# Patient Record
Sex: MALE | Race: WHITE | NOT HISPANIC OR LATINO | Employment: OTHER | URBAN - METROPOLITAN AREA
[De-identification: names, ages, dates, MRNs, and addresses within clinical notes are randomized per-mention and may not be internally consistent; named-entity substitution may affect disease eponyms.]

---

## 2017-01-19 ENCOUNTER — RECORDS - RIVER FALLS (OUTPATIENT)
Dept: FAMILY MEDICINE | Facility: CLINIC | Age: 57
End: 2017-01-19

## 2017-02-25 ENCOUNTER — OFFICE VISIT - RIVER FALLS (OUTPATIENT)
Dept: FAMILY MEDICINE | Facility: CLINIC | Age: 57
End: 2017-02-25

## 2017-02-25 ASSESSMENT — MIFFLIN-ST. JEOR: SCORE: 1990.13

## 2017-06-20 ENCOUNTER — OFFICE VISIT - RIVER FALLS (OUTPATIENT)
Dept: FAMILY MEDICINE | Facility: CLINIC | Age: 57
End: 2017-06-20

## 2017-06-20 ASSESSMENT — MIFFLIN-ST. JEOR: SCORE: 1999.21

## 2017-08-14 ENCOUNTER — OFFICE VISIT - RIVER FALLS (OUTPATIENT)
Dept: FAMILY MEDICINE | Facility: CLINIC | Age: 57
End: 2017-08-14

## 2017-08-14 ASSESSMENT — MIFFLIN-ST. JEOR: SCORE: 2017.35

## 2017-08-15 LAB
CREAT SERPL-MCNC: 0.94 MG/DL (ref 0.7–1.33)
GLUCOSE BLD-MCNC: 102 MG/DL (ref 65–99)
PSA SERPL-MCNC: 1.5 NG/ML

## 2017-08-30 ASSESSMENT — MIFFLIN-ST. JEOR: SCORE: 1958.38

## 2017-09-06 ENCOUNTER — SURGERY - HEALTHEAST (OUTPATIENT)
Dept: SURGERY | Facility: CLINIC | Age: 57
End: 2017-09-06

## 2017-09-06 ENCOUNTER — ANESTHESIA - HEALTHEAST (OUTPATIENT)
Dept: SURGERY | Facility: CLINIC | Age: 57
End: 2017-09-06

## 2017-12-22 ENCOUNTER — OFFICE VISIT - RIVER FALLS (OUTPATIENT)
Dept: FAMILY MEDICINE | Facility: CLINIC | Age: 57
End: 2017-12-22

## 2017-12-22 ASSESSMENT — MIFFLIN-ST. JEOR: SCORE: 2024.61

## 2018-08-30 ENCOUNTER — OFFICE VISIT - RIVER FALLS (OUTPATIENT)
Dept: FAMILY MEDICINE | Facility: CLINIC | Age: 58
End: 2018-08-30

## 2018-08-30 ASSESSMENT — MIFFLIN-ST. JEOR: SCORE: 1997.62

## 2018-08-31 ENCOUNTER — AMBULATORY - RIVER FALLS (OUTPATIENT)
Dept: FAMILY MEDICINE | Facility: CLINIC | Age: 58
End: 2018-08-31

## 2018-09-01 LAB
CHOLEST SERPL-MCNC: 233 MG/DL
CHOLEST/HDLC SERPL: 3.9 {RATIO}
CREAT SERPL-MCNC: 1.06 MG/DL (ref 0.7–1.33)
GLUCOSE BLD-MCNC: 101 MG/DL (ref 65–99)
HDLC SERPL-MCNC: 60 MG/DL
LDLC SERPL CALC-MCNC: 147 MG/DL
NONHDLC SERPL-MCNC: 173 MG/DL
PSA SERPL-MCNC: 1.9 NG/ML
TRIGL SERPL-MCNC: 132 MG/DL

## 2018-12-21 ENCOUNTER — OFFICE VISIT - RIVER FALLS (OUTPATIENT)
Dept: FAMILY MEDICINE | Facility: CLINIC | Age: 58
End: 2018-12-21

## 2018-12-21 ASSESSMENT — MIFFLIN-ST. JEOR: SCORE: 2001.25

## 2018-12-26 ENCOUNTER — AMBULATORY - RIVER FALLS (OUTPATIENT)
Dept: FAMILY MEDICINE | Facility: CLINIC | Age: 58
End: 2018-12-26

## 2018-12-27 LAB
CHOLEST SERPL-MCNC: 167 MG/DL
CHOLEST/HDLC SERPL: 2.9 {RATIO}
HDLC SERPL-MCNC: 57 MG/DL
LDLC SERPL CALC-MCNC: 95 MG/DL
NONHDLC SERPL-MCNC: 110 MG/DL
TRIGL SERPL-MCNC: 68 MG/DL

## 2019-05-21 ENCOUNTER — OFFICE VISIT - RIVER FALLS (OUTPATIENT)
Dept: FAMILY MEDICINE | Facility: CLINIC | Age: 59
End: 2019-05-21

## 2019-05-21 ASSESSMENT — MIFFLIN-ST. JEOR: SCORE: 1972.22

## 2019-09-25 ENCOUNTER — OFFICE VISIT - RIVER FALLS (OUTPATIENT)
Dept: FAMILY MEDICINE | Facility: CLINIC | Age: 59
End: 2019-09-25

## 2019-09-25 ASSESSMENT — MIFFLIN-ST. JEOR: SCORE: 1993.99

## 2019-09-26 ENCOUNTER — COMMUNICATION - RIVER FALLS (OUTPATIENT)
Dept: FAMILY MEDICINE | Facility: CLINIC | Age: 59
End: 2019-09-26

## 2019-09-26 LAB
BUN SERPL-MCNC: 23 MG/DL (ref 7–25)
BUN/CREAT RATIO - HISTORICAL: NORMAL (ref 6–22)
CALCIUM SERPL-MCNC: 9.8 MG/DL (ref 8.6–10.3)
CHLORIDE BLD-SCNC: 106 MMOL/L (ref 98–110)
CHOLEST SERPL-MCNC: 196 MG/DL
CHOLEST/HDLC SERPL: 3.1 {RATIO}
CO2 SERPL-SCNC: 26 MMOL/L (ref 20–32)
CREAT SERPL-MCNC: 0.95 MG/DL (ref 0.7–1.25)
EGFRCR SERPLBLD CKD-EPI 2021: 87 ML/MIN/1.73M2
GLUCOSE BLD-MCNC: 95 MG/DL (ref 65–99)
HDLC SERPL-MCNC: 64 MG/DL
LDLC SERPL CALC-MCNC: 111 MG/DL
NONHDLC SERPL-MCNC: 132 MG/DL
POTASSIUM BLD-SCNC: 4.6 MMOL/L (ref 3.5–5.3)
PSA SERPL-MCNC: 2.5 NG/ML
SODIUM SERPL-SCNC: 141 MMOL/L (ref 135–146)
TRIGL SERPL-MCNC: 105 MG/DL

## 2019-10-14 ENCOUNTER — OFFICE VISIT - RIVER FALLS (OUTPATIENT)
Dept: FAMILY MEDICINE | Facility: CLINIC | Age: 59
End: 2019-10-14

## 2019-12-13 ENCOUNTER — OFFICE VISIT - RIVER FALLS (OUTPATIENT)
Dept: FAMILY MEDICINE | Facility: CLINIC | Age: 59
End: 2019-12-13

## 2019-12-13 ASSESSMENT — MIFFLIN-ST. JEOR: SCORE: 1973.12

## 2019-12-15 LAB — BACTERIA SPEC CULT: NORMAL

## 2019-12-16 ENCOUNTER — COMMUNICATION - RIVER FALLS (OUTPATIENT)
Dept: FAMILY MEDICINE | Facility: CLINIC | Age: 59
End: 2019-12-16

## 2020-01-02 ENCOUNTER — OFFICE VISIT - RIVER FALLS (OUTPATIENT)
Dept: FAMILY MEDICINE | Facility: CLINIC | Age: 60
End: 2020-01-02
Payer: COMMERCIAL

## 2020-01-20 ENCOUNTER — OFFICE VISIT - RIVER FALLS (OUTPATIENT)
Dept: FAMILY MEDICINE | Facility: CLINIC | Age: 60
End: 2020-01-20
Payer: COMMERCIAL

## 2020-01-20 ASSESSMENT — MIFFLIN-ST. JEOR: SCORE: 1995.8

## 2020-02-10 ENCOUNTER — OFFICE VISIT - RIVER FALLS (OUTPATIENT)
Dept: FAMILY MEDICINE | Facility: CLINIC | Age: 60
End: 2020-02-10
Payer: COMMERCIAL

## 2020-02-20 ENCOUNTER — OFFICE VISIT - RIVER FALLS (OUTPATIENT)
Dept: FAMILY MEDICINE | Facility: CLINIC | Age: 60
End: 2020-02-20
Payer: COMMERCIAL

## 2020-02-20 ASSESSMENT — MIFFLIN-ST. JEOR: SCORE: 1982.19

## 2020-10-12 ENCOUNTER — OFFICE VISIT - RIVER FALLS (OUTPATIENT)
Dept: FAMILY MEDICINE | Facility: CLINIC | Age: 60
End: 2020-10-12
Payer: COMMERCIAL

## 2020-10-12 ASSESSMENT — MIFFLIN-ST. JEOR: SCORE: 1950.44

## 2020-10-13 ENCOUNTER — COMMUNICATION - RIVER FALLS (OUTPATIENT)
Dept: FAMILY MEDICINE | Facility: CLINIC | Age: 60
End: 2020-10-13
Payer: COMMERCIAL

## 2020-10-13 LAB
BUN SERPL-MCNC: 22 MG/DL (ref 7–25)
BUN/CREAT RATIO - HISTORICAL: ABNORMAL (ref 6–22)
CALCIUM SERPL-MCNC: 9.5 MG/DL (ref 8.6–10.3)
CHLORIDE BLD-SCNC: 103 MMOL/L (ref 98–110)
CHOLEST SERPL-MCNC: 200 MG/DL
CHOLEST/HDLC SERPL: 2.9 {RATIO}
CO2 SERPL-SCNC: 33 MMOL/L (ref 20–32)
CREAT SERPL-MCNC: 0.97 MG/DL (ref 0.7–1.25)
EGFRCR SERPLBLD CKD-EPI 2021: 84 ML/MIN/1.73M2
ERYTHROCYTE [DISTWIDTH] IN BLOOD BY AUTOMATED COUNT: 12.4 % (ref 11–15)
GLUCOSE BLD-MCNC: 94 MG/DL (ref 65–99)
HCT VFR BLD AUTO: 43.3 % (ref 38.5–50)
HDLC SERPL-MCNC: 68 MG/DL
HGB BLD-MCNC: 14.8 GM/DL (ref 13.2–17.1)
LDLC SERPL CALC-MCNC: 112 MG/DL
MCH RBC QN AUTO: 31.2 PG (ref 27–33)
MCHC RBC AUTO-ENTMCNC: 34.2 GM/DL (ref 32–36)
MCV RBC AUTO: 91.4 FL (ref 80–100)
NONHDLC SERPL-MCNC: 132 MG/DL
PLATELET # BLD AUTO: 321 10*3/UL (ref 140–400)
PMV BLD: 9.7 FL (ref 7.5–12.5)
POTASSIUM BLD-SCNC: 4.8 MMOL/L (ref 3.5–5.3)
PSA SERPL-MCNC: 2 NG/ML
RBC # BLD AUTO: 4.74 10*6/UL (ref 4.2–5.8)
SODIUM SERPL-SCNC: 140 MMOL/L (ref 135–146)
TRIGL SERPL-MCNC: 92 MG/DL
WBC # BLD AUTO: 6 10*3/UL (ref 3.8–10.8)

## 2020-10-20 DIAGNOSIS — Z11.59 ENCOUNTER FOR SCREENING FOR OTHER VIRAL DISEASES: Primary | ICD-10-CM

## 2020-11-02 ENCOUNTER — AMBULATORY - HEALTHEAST (OUTPATIENT)
Dept: LAB | Facility: CLINIC | Age: 60
End: 2020-11-02

## 2020-11-02 DIAGNOSIS — Z11.59 SCREENING FOR VIRAL DISEASE: ICD-10-CM

## 2020-11-05 ENCOUNTER — COMMUNICATION - HEALTHEAST (OUTPATIENT)
Dept: TELEHEALTH | Facility: CLINIC | Age: 60
End: 2020-11-05

## 2020-11-05 ENCOUNTER — AMBULATORY - HEALTHEAST (OUTPATIENT)
Dept: LAB | Facility: CLINIC | Age: 60
End: 2020-11-05

## 2020-11-05 DIAGNOSIS — Z11.59 SCREENING FOR VIRAL DISEASE: ICD-10-CM

## 2020-11-06 ENCOUNTER — COMMUNICATION - HEALTHEAST (OUTPATIENT)
Dept: SCHEDULING | Facility: CLINIC | Age: 60
End: 2020-11-06

## 2020-11-06 RX ORDER — MULTIPLE VITAMINS W/ MINERALS TAB 9MG-400MCG
1 TAB ORAL DAILY
COMMUNITY

## 2020-11-06 RX ORDER — TRAMADOL HYDROCHLORIDE 50 MG/1
50 TABLET ORAL EVERY 6 HOURS PRN
COMMUNITY
End: 2021-11-30

## 2020-11-06 RX ORDER — ROSUVASTATIN CALCIUM 10 MG/1
10 TABLET, COATED ORAL AT BEDTIME
COMMUNITY
End: 2022-10-04

## 2020-11-09 ENCOUNTER — APPOINTMENT (OUTPATIENT)
Dept: GENERAL RADIOLOGY | Facility: CLINIC | Age: 60
End: 2020-11-09
Attending: ORTHOPAEDIC SURGERY
Payer: COMMERCIAL

## 2020-11-09 ENCOUNTER — ANESTHESIA EVENT (OUTPATIENT)
Dept: SURGERY | Facility: CLINIC | Age: 60
End: 2020-11-09
Payer: COMMERCIAL

## 2020-11-09 ENCOUNTER — HOSPITAL ENCOUNTER (OUTPATIENT)
Facility: CLINIC | Age: 60
Discharge: HOME | End: 2020-11-10
Attending: ORTHOPAEDIC SURGERY | Admitting: ORTHOPAEDIC SURGERY
Payer: COMMERCIAL

## 2020-11-09 ENCOUNTER — ANESTHESIA (OUTPATIENT)
Dept: SURGERY | Facility: CLINIC | Age: 60
End: 2020-11-09
Payer: COMMERCIAL

## 2020-11-09 DIAGNOSIS — Z98.1 S/P ANKLE FUSION: Primary | ICD-10-CM

## 2020-11-09 PROCEDURE — 250N000011 HC RX IP 250 OP 636: Performed by: ANESTHESIOLOGY

## 2020-11-09 PROCEDURE — 360N000030 HC SURGERY LEVEL 4 W FLUORO 1ST 30 MIN: Performed by: ORTHOPAEDIC SURGERY

## 2020-11-09 PROCEDURE — 250N000013 HC RX MED GY IP 250 OP 250 PS 637: Performed by: ORTHOPAEDIC SURGERY

## 2020-11-09 PROCEDURE — 271N000001 HC OR GENERAL SUPPLY NON-STERILE: Performed by: ORTHOPAEDIC SURGERY

## 2020-11-09 PROCEDURE — 761N000001 HC RECOVERY PHASE 1 LEVEL 1 FIRST HR: Performed by: ORTHOPAEDIC SURGERY

## 2020-11-09 PROCEDURE — 761N000002 HC RECOVERY PHASE 1 LEVEL 1 EA ADDTL HR: Performed by: ORTHOPAEDIC SURGERY

## 2020-11-09 PROCEDURE — 278N000051 HC OR IMPLANT GENERAL: Performed by: ORTHOPAEDIC SURGERY

## 2020-11-09 PROCEDURE — 250N000009 HC RX 250: Performed by: PHYSICIAN ASSISTANT

## 2020-11-09 PROCEDURE — 999N000139 HC STATISTIC PRE-PROCEDURE ASSESSMENT II: Performed by: ORTHOPAEDIC SURGERY

## 2020-11-09 PROCEDURE — 258N000003 HC RX IP 258 OP 636: Performed by: ANESTHESIOLOGY

## 2020-11-09 PROCEDURE — 250N000009 HC RX 250: Performed by: ORTHOPAEDIC SURGERY

## 2020-11-09 PROCEDURE — 370N000002 HC ANESTHESIA TECHNICAL FEE, EACH ADDTL 15 MIN: Performed by: ORTHOPAEDIC SURGERY

## 2020-11-09 PROCEDURE — 250N000009 HC RX 250: Performed by: NURSE ANESTHETIST, CERTIFIED REGISTERED

## 2020-11-09 PROCEDURE — C1769 GUIDE WIRE: HCPCS | Performed by: ORTHOPAEDIC SURGERY

## 2020-11-09 PROCEDURE — 272N000001 HC OR GENERAL SUPPLY STERILE: Performed by: ORTHOPAEDIC SURGERY

## 2020-11-09 PROCEDURE — 250N000013 HC RX MED GY IP 250 OP 250 PS 637: Performed by: PHYSICIAN ASSISTANT

## 2020-11-09 PROCEDURE — 370N000001 HC ANESTHESIA TECHNICAL FEE, 1ST 30 MIN: Performed by: ORTHOPAEDIC SURGERY

## 2020-11-09 PROCEDURE — 250N000009 HC RX 250: Performed by: ANESTHESIOLOGY

## 2020-11-09 PROCEDURE — 250N000011 HC RX IP 250 OP 636: Performed by: NURSE ANESTHETIST, CERTIFIED REGISTERED

## 2020-11-09 PROCEDURE — 250N000011 HC RX IP 250 OP 636: Performed by: PHYSICIAN ASSISTANT

## 2020-11-09 PROCEDURE — 360N000027 HC SURGERY LEVEL 4 EA 15 ADDTL MIN: Performed by: ORTHOPAEDIC SURGERY

## 2020-11-09 PROCEDURE — C1713 ANCHOR/SCREW BN/BN,TIS/BN: HCPCS | Performed by: ORTHOPAEDIC SURGERY

## 2020-11-09 PROCEDURE — 250N000003 HC SEVOFLURANE, EA 15 MIN: Performed by: ORTHOPAEDIC SURGERY

## 2020-11-09 PROCEDURE — 999N000179 XR SURGERY CARM FLUORO LESS THAN 5 MIN W STILLS

## 2020-11-09 DEVICE — IMP SCR STRK LOCK 5.0X50MM FT 1896-5050S: Type: IMPLANTABLE DEVICE | Site: ANKLE | Status: FUNCTIONAL

## 2020-11-09 DEVICE — IMPLANTABLE DEVICE: Type: IMPLANTABLE DEVICE | Site: ANKLE | Status: FUNCTIONAL

## 2020-11-09 DEVICE — IMP SCR STRK LOCK 5.0X40MM FT 1896-5040S: Type: IMPLANTABLE DEVICE | Site: ANKLE | Status: FUNCTIONAL

## 2020-11-09 DEVICE — IMP SCR STRK LOCK 5.0X70MM FT 1896-5070S: Type: IMPLANTABLE DEVICE | Site: ANKLE | Status: FUNCTIONAL

## 2020-11-09 DEVICE — IMP SCR STRK LOCK 5.0X55MM FT 1896-5055S: Type: IMPLANTABLE DEVICE | Site: ANKLE | Status: FUNCTIONAL

## 2020-11-09 DEVICE — IMP SCR STRK LOCK 5.0X35MM FT 1896-5035S: Type: IMPLANTABLE DEVICE | Site: ANKLE | Status: FUNCTIONAL

## 2020-11-09 RX ORDER — HYDROXYZINE HYDROCHLORIDE 25 MG/1
25 TABLET, FILM COATED ORAL EVERY 4 HOURS PRN
Qty: 30 TABLET | Refills: 1 | Status: SHIPPED | OUTPATIENT
Start: 2020-11-09 | End: 2021-11-30

## 2020-11-09 RX ORDER — HYDROXYZINE HYDROCHLORIDE 25 MG/1
25 TABLET, FILM COATED ORAL EVERY 6 HOURS PRN
Status: DISCONTINUED | OUTPATIENT
Start: 2020-11-09 | End: 2020-11-10 | Stop reason: HOSPADM

## 2020-11-09 RX ORDER — HYDROMORPHONE HYDROCHLORIDE 1 MG/ML
.3-.5 INJECTION, SOLUTION INTRAMUSCULAR; INTRAVENOUS; SUBCUTANEOUS EVERY 5 MIN PRN
Status: DISCONTINUED | OUTPATIENT
Start: 2020-11-09 | End: 2020-11-09

## 2020-11-09 RX ORDER — ROSUVASTATIN CALCIUM 10 MG/1
10 TABLET, COATED ORAL AT BEDTIME
Status: DISCONTINUED | OUTPATIENT
Start: 2020-11-09 | End: 2020-11-10 | Stop reason: HOSPADM

## 2020-11-09 RX ORDER — ONDANSETRON 2 MG/ML
4 INJECTION INTRAMUSCULAR; INTRAVENOUS EVERY 30 MIN PRN
Status: DISCONTINUED | OUTPATIENT
Start: 2020-11-09 | End: 2020-11-09

## 2020-11-09 RX ORDER — AMOXICILLIN 250 MG
1-2 CAPSULE ORAL 2 TIMES DAILY
Qty: 30 TABLET | Refills: 0 | Status: SHIPPED | OUTPATIENT
Start: 2020-11-09 | End: 2021-11-30

## 2020-11-09 RX ORDER — CEFAZOLIN SODIUM 1 G/3ML
1 INJECTION, POWDER, FOR SOLUTION INTRAMUSCULAR; INTRAVENOUS SEE ADMIN INSTRUCTIONS
Status: DISCONTINUED | OUTPATIENT
Start: 2020-11-09 | End: 2020-11-09 | Stop reason: HOSPADM

## 2020-11-09 RX ORDER — HYDROMORPHONE HYDROCHLORIDE 1 MG/ML
0.2 INJECTION, SOLUTION INTRAMUSCULAR; INTRAVENOUS; SUBCUTANEOUS
Status: DISCONTINUED | OUTPATIENT
Start: 2020-11-09 | End: 2020-11-10 | Stop reason: HOSPADM

## 2020-11-09 RX ORDER — FENTANYL CITRATE 50 UG/ML
INJECTION, SOLUTION INTRAMUSCULAR; INTRAVENOUS PRN
Status: DISCONTINUED | OUTPATIENT
Start: 2020-11-09 | End: 2020-11-09

## 2020-11-09 RX ORDER — SODIUM CHLORIDE, SODIUM LACTATE, POTASSIUM CHLORIDE, CALCIUM CHLORIDE 600; 310; 30; 20 MG/100ML; MG/100ML; MG/100ML; MG/100ML
INJECTION, SOLUTION INTRAVENOUS CONTINUOUS
Status: DISCONTINUED | OUTPATIENT
Start: 2020-11-09 | End: 2020-11-09 | Stop reason: HOSPADM

## 2020-11-09 RX ORDER — OXYCODONE HYDROCHLORIDE 5 MG/1
5-10 TABLET ORAL
Qty: 40 TABLET | Refills: 0 | Status: SHIPPED | OUTPATIENT
Start: 2020-11-09 | End: 2021-11-30

## 2020-11-09 RX ORDER — LIDOCAINE 40 MG/G
CREAM TOPICAL
Status: DISCONTINUED | OUTPATIENT
Start: 2020-11-09 | End: 2020-11-10 | Stop reason: HOSPADM

## 2020-11-09 RX ORDER — OXYMETAZOLINE HYDROCHLORIDE 0.05 G/100ML
SPRAY NASAL AT BEDTIME
Status: DISCONTINUED | OUTPATIENT
Start: 2020-11-09 | End: 2020-11-10 | Stop reason: HOSPADM

## 2020-11-09 RX ORDER — ONDANSETRON 2 MG/ML
INJECTION INTRAMUSCULAR; INTRAVENOUS PRN
Status: DISCONTINUED | OUTPATIENT
Start: 2020-11-09 | End: 2020-11-09

## 2020-11-09 RX ORDER — PROPOFOL 10 MG/ML
INJECTION, EMULSION INTRAVENOUS PRN
Status: DISCONTINUED | OUTPATIENT
Start: 2020-11-09 | End: 2020-11-09

## 2020-11-09 RX ORDER — LIDOCAINE HYDROCHLORIDE 20 MG/ML
INJECTION, SOLUTION INFILTRATION; PERINEURAL PRN
Status: DISCONTINUED | OUTPATIENT
Start: 2020-11-09 | End: 2020-11-09

## 2020-11-09 RX ORDER — CEFAZOLIN SODIUM 2 G/100ML
2 INJECTION, SOLUTION INTRAVENOUS
Status: COMPLETED | OUTPATIENT
Start: 2020-11-09 | End: 2020-11-09

## 2020-11-09 RX ORDER — MAGNESIUM HYDROXIDE 1200 MG/15ML
LIQUID ORAL PRN
Status: DISCONTINUED | OUTPATIENT
Start: 2020-11-09 | End: 2020-11-09 | Stop reason: HOSPADM

## 2020-11-09 RX ORDER — ONDANSETRON 4 MG/1
4-8 TABLET, ORALLY DISINTEGRATING ORAL EVERY 6 HOURS PRN
Qty: 12 TABLET | Refills: 1 | Status: SHIPPED | OUTPATIENT
Start: 2020-11-09 | End: 2021-11-30

## 2020-11-09 RX ORDER — ONDANSETRON 4 MG/1
4 TABLET, ORALLY DISINTEGRATING ORAL EVERY 6 HOURS PRN
Status: DISCONTINUED | OUTPATIENT
Start: 2020-11-09 | End: 2020-11-10 | Stop reason: HOSPADM

## 2020-11-09 RX ORDER — MULTIPLE VITAMINS W/ MINERALS TAB 9MG-400MCG
1 TAB ORAL DAILY
Status: DISCONTINUED | OUTPATIENT
Start: 2020-11-09 | End: 2020-11-10 | Stop reason: HOSPADM

## 2020-11-09 RX ORDER — FENTANYL CITRATE 50 UG/ML
25-50 INJECTION, SOLUTION INTRAMUSCULAR; INTRAVENOUS
Status: DISCONTINUED | OUTPATIENT
Start: 2020-11-09 | End: 2020-11-09

## 2020-11-09 RX ORDER — OXYCODONE HYDROCHLORIDE 5 MG/1
5-10 TABLET ORAL
Status: DISCONTINUED | OUTPATIENT
Start: 2020-11-09 | End: 2020-11-10 | Stop reason: HOSPADM

## 2020-11-09 RX ORDER — NALOXONE HYDROCHLORIDE 0.4 MG/ML
.1-.4 INJECTION, SOLUTION INTRAMUSCULAR; INTRAVENOUS; SUBCUTANEOUS
Status: ACTIVE | OUTPATIENT
Start: 2020-11-09 | End: 2020-11-10

## 2020-11-09 RX ORDER — ONDANSETRON 4 MG/1
4 TABLET, ORALLY DISINTEGRATING ORAL EVERY 30 MIN PRN
Status: DISCONTINUED | OUTPATIENT
Start: 2020-11-09 | End: 2020-11-09

## 2020-11-09 RX ORDER — ONDANSETRON 2 MG/ML
4 INJECTION INTRAMUSCULAR; INTRAVENOUS EVERY 6 HOURS PRN
Status: DISCONTINUED | OUTPATIENT
Start: 2020-11-09 | End: 2020-11-10 | Stop reason: HOSPADM

## 2020-11-09 RX ORDER — DEXAMETHASONE SODIUM PHOSPHATE 4 MG/ML
INJECTION, SOLUTION INTRA-ARTICULAR; INTRALESIONAL; INTRAMUSCULAR; INTRAVENOUS; SOFT TISSUE PRN
Status: DISCONTINUED | OUTPATIENT
Start: 2020-11-09 | End: 2020-11-09

## 2020-11-09 RX ORDER — SODIUM CHLORIDE, SODIUM LACTATE, POTASSIUM CHLORIDE, CALCIUM CHLORIDE 600; 310; 30; 20 MG/100ML; MG/100ML; MG/100ML; MG/100ML
INJECTION, SOLUTION INTRAVENOUS CONTINUOUS
Status: DISCONTINUED | OUTPATIENT
Start: 2020-11-09 | End: 2020-11-10

## 2020-11-09 RX ORDER — FENTANYL CITRATE 50 UG/ML
50 INJECTION, SOLUTION INTRAMUSCULAR; INTRAVENOUS EVERY 5 MIN PRN
Status: DISCONTINUED | OUTPATIENT
Start: 2020-11-09 | End: 2020-11-09 | Stop reason: HOSPADM

## 2020-11-09 RX ADMIN — HYDROXYZINE HYDROCHLORIDE 25 MG: 25 TABLET, FILM COATED ORAL at 22:09

## 2020-11-09 RX ADMIN — CEFAZOLIN SODIUM 2 G: 2 INJECTION, SOLUTION INTRAVENOUS at 09:27

## 2020-11-09 RX ADMIN — ROSUVASTATIN CALCIUM 10 MG: 10 TABLET, FILM COATED ORAL at 22:09

## 2020-11-09 RX ADMIN — ONDANSETRON 4 MG: 2 INJECTION INTRAMUSCULAR; INTRAVENOUS at 09:31

## 2020-11-09 RX ADMIN — FENTANYL CITRATE 50 MCG: 0.05 INJECTION, SOLUTION INTRAMUSCULAR; INTRAVENOUS at 10:42

## 2020-11-09 RX ADMIN — DEXAMETHASONE SODIUM PHOSPHATE 4 MG: 4 INJECTION, SOLUTION INTRA-ARTICULAR; INTRALESIONAL; INTRAMUSCULAR; INTRAVENOUS; SOFT TISSUE at 09:31

## 2020-11-09 RX ADMIN — SODIUM CHLORIDE, POTASSIUM CHLORIDE, SODIUM LACTATE AND CALCIUM CHLORIDE: 600; 310; 30; 20 INJECTION, SOLUTION INTRAVENOUS at 11:55

## 2020-11-09 RX ADMIN — SODIUM CHLORIDE, POTASSIUM CHLORIDE, SODIUM LACTATE AND CALCIUM CHLORIDE: 600; 310; 30; 20 INJECTION, SOLUTION INTRAVENOUS at 08:11

## 2020-11-09 RX ADMIN — BUPIVACAINE HYDROCHLORIDE 20 ML GIVEN: 5 INJECTION, SOLUTION EPIDURAL; INTRACAUDAL; PERINEURAL at 08:23

## 2020-11-09 RX ADMIN — OXYMETAZOLINE HYDROCHLORIDE: 0.05 SPRAY NASAL at 22:10

## 2020-11-09 RX ADMIN — FENTANYL CITRATE 50 MCG: 50 INJECTION, SOLUTION INTRAMUSCULAR; INTRAVENOUS at 09:19

## 2020-11-09 RX ADMIN — OXYCODONE HYDROCHLORIDE 10 MG: 5 TABLET ORAL at 22:09

## 2020-11-09 RX ADMIN — LIDOCAINE HYDROCHLORIDE 0.1 ML: 10 INJECTION, SOLUTION EPIDURAL; INFILTRATION; INTRACAUDAL; PERINEURAL at 07:26

## 2020-11-09 RX ADMIN — PROPOFOL 200 MG: 10 INJECTION, EMULSION INTRAVENOUS at 09:23

## 2020-11-09 RX ADMIN — HYDROMORPHONE HYDROCHLORIDE 0.5 MG: 1 INJECTION, SOLUTION INTRAMUSCULAR; INTRAVENOUS; SUBCUTANEOUS at 10:51

## 2020-11-09 RX ADMIN — FENTANYL CITRATE 50 MCG: 0.05 INJECTION, SOLUTION INTRAMUSCULAR; INTRAVENOUS at 08:20

## 2020-11-09 RX ADMIN — LIDOCAINE HYDROCHLORIDE 100 MG: 20 INJECTION, SOLUTION INFILTRATION; PERINEURAL at 09:19

## 2020-11-09 RX ADMIN — BUPIVACAINE HYDROCHLORIDE 15 ML GIVEN: 5 INJECTION, SOLUTION EPIDURAL; INTRACAUDAL; PERINEURAL at 11:20

## 2020-11-09 RX ADMIN — PROPOFOL 200 MG: 10 INJECTION, EMULSION INTRAVENOUS at 09:19

## 2020-11-09 RX ADMIN — MIDAZOLAM HYDROCHLORIDE 2 MG: 1 INJECTION, SOLUTION INTRAMUSCULAR; INTRAVENOUS at 08:21

## 2020-11-09 RX ADMIN — OXYCODONE HYDROCHLORIDE 5 MG: 5 TABLET ORAL at 19:08

## 2020-11-09 RX ADMIN — FENTANYL CITRATE 50 MCG: 50 INJECTION, SOLUTION INTRAMUSCULAR; INTRAVENOUS at 09:35

## 2020-11-09 SDOH — HEALTH STABILITY: MENTAL HEALTH: CURRENT SMOKER: 0

## 2020-11-09 ASSESSMENT — MIFFLIN-ST. JEOR: SCORE: 1937.53

## 2020-11-09 ASSESSMENT — LIFESTYLE VARIABLES: TOBACCO_USE: 0

## 2020-11-09 NOTE — BRIEF OP NOTE
[facility]    Brief Operative Note    Pre-operative diagnosis: Disorder of ankle [M25.9]  Post-operative diagnosis Failed L ankle fusion    Procedure: Procedure(s):  REVISION LEFT ANKLE FUSION WITH  CORRECTIVE OSTEOTOMY  Surgeon: Surgeon(s) and Role:     * Damaris Panda MD - Primary     * Froilan López PA-C - Assisting  Anesthesia: Combined General with Block   Estimated blood loss: Less than 50 ml  Drains: None  Specimens:   ID Type Source Tests Collected by Time Destination   1 : LEFT ANKLE HARDWARE Other (specify in comments) Other OR DOCUMENTATION ONLY Damaris Panda MD 11/9/2020 10:13 AM      Findings:   Expected.  Complications: None.  Implants:   Implant Name Type Inv. Item Serial No.  Lot No. LRB No. Used Action   previously implanted screws      Left 11 Explanted   previously implanted plate      Left 1 Explanted   T2 Ankle System Ankle Arthrodesis Nail, left 012 x 200mm    YADI [id number] Left 1 Implanted   IMP SCR STRK LOCK 5.0X55MM FT 1896-5055S Metallic Hardware/Angelica IMP SCR STRK LOCK 5.0X55MM FT 1896-5055S  [employer] [id number] Left 1 Implanted   IMP SCR STRK LOCK 5.0X50MM FT 1896-5050S Metallic Hardware/Angelica IMP SCR STRK LOCK 5.0X50MM FT 1896-5050S  [employer] [id number] Left 1 Implanted   IMP SCR STRK LOCK 5.0X35MM FT 1896-5035S Metallic Hardware/Angelica IMP SCR STRK LOCK 5.0X35MM FT 1896-5035S  [employer] [id number] Left 1 Implanted   IMP SCR STRK LOCK 5.0X70MM FT 1896-5070S Metallic Hardware/Angelica IMP SCR STRK LOCK 5.0X70MM FT 1896-5070S  YADIBuru Buru [id number] Left 1 Implanted   IMP END CAP STRK T2 FT SPNIAL SCR 1826-0003S Metallic Hardware/Angelica IMP END CAP STRK T2 FT SPNIAL SCR 1826-0003S  [employer] [id number] Left 1 Implanted   IMP SCR STRK LOCK 5.0X40MM FT 1896-5040S Metallic Hardware/Angelica IMP SCR STRK LOCK 5.0X40MM FT 1896-5040S  [employer] [id number] Left 1 Implanted

## 2020-11-09 NOTE — ANESTHESIA PROCEDURE NOTES
Procedure note : Saphenous      Staff -   Anesthesiologist:  Bean Hernández MD  Performed By: anesthesiologist  Pre-Procedure  Performed by Bean Hernández MD  Location: pre-op      Pre-Anesthestic Checklist: patient identified, IV checked, site marked, risks and benefits discussed, informed consent, monitors and equipment checked, pre-op evaluation, at physician/surgeon's request and post-op pain management    Timeout  Correct Patient: Yes   Correct Procedure: Yes   Correct Site: Yes   Correct Laterality: Yes   Correct Position: Yes   Site Marked: Yes   .   Procedure Documentation    .    Procedure: Saphenous, left.   Patient Position:supine   Ultrasound used to identify targeted nerve, plexus, or vascular marker and placed a needle adjacent to it., Ultrasound was used to visualize the spread of the anesthetic in close proximity to the above stated nerve. A permanent image is entered into the patient's record.  Patient Prep/Sterile Barriers; chlorhexidine gluconate and isopropyl alcohol.  .  Needle: short bevel   Needle Gauge: 21.    Needle Length (Inches) 4   .        Assessment/Narrative  Paresthesias: No.  .  The placement was negative for: blood aspirated, painful injection and site bleeding.  Bolus given via..   Secured via.   Complications: none. Comments:  Ultrasound Interpretation, Peripheral Nerve Block. 15 ml of 0.5% Bupivicaine with 1/400k epi.     1. Under ultrasound guidance, the needle was inserted and placed in close proximity to the target nerve(s).  2. Ultrasound was also used to visualize the spread of the anesthetic in close proximity to the nerve(s) being blocked.  Local anesthetic was administered in incremental doses, with intermittent negative aspiration.    3. The nerve(s) appeared anatomically normal.  4. There were no apparent abnormal pathological findings.  5. A permanent ultrasound image was saved in the patient's record.    Pt tolerated well.    No complications.      The  surgeon has given a verbal order transferring care of this patient to me for the performance of a regional analgesia block for post-op pain control. It is requested of me because I am uniquely trained and qualified to perform this block and the surgeon is neither trained nor qualified to perform this procedure.    Bean Hernández MD   11:23 AM

## 2020-11-09 NOTE — OR NURSING
Report given to Tarsha RN station 55. Glasses and hearing aid x 2 given back to patient. One White belongings bag and cpap sent up with patient.

## 2020-11-09 NOTE — ANESTHESIA POSTPROCEDURE EVALUATION
Patient: Reggie Owens    Procedure(s):  REVISION LEFT ANKLE FUSION WITH  CORRECTIVE OSTEOTOMY    Diagnosis:Disorder of ankle [M25.9]  Diagnosis Additional Information: No value filed.    Anesthesia Type:  General, Peripheral Nerve Block    Note:  Anesthesia Post Evaluation    Patient location during evaluation: PACU  Patient participation: Able to participate in evaluation but full recovery from regional anesthesia has not yet ocurrred but is anticipated to occur within 48 hours  Level of consciousness: awake and alert  Pain management: adequate  Airway patency: patent  Cardiovascular status: acceptable and stable  Respiratory status: acceptable, spontaneous ventilation, unassisted and nonlabored ventilation  Hydration status: acceptable  PONV: none             Last vitals:  Vitals:    11/09/20 1032 11/09/20 1045 11/09/20 1100   BP: (!) 125/90 (!) 141/99 132/88   Pulse: 78 75 75   Resp: 16 12 20   Temp: 36.1  C (96.9  F)     SpO2: 96% 97% 96%         Electronically Signed By: Bean Hernández MD  November 9, 2020  11:02 AM

## 2020-11-09 NOTE — ANESTHESIA PROCEDURE NOTES
Airway   Date/Time: 11/9/2020 9:25 AM   Patient location during procedure: OR    Staff -   Anesthesiologist:  Bean Hernández MD  CRNA: Elke Singer APRN CRNA  Performed By: CRNA    Consent for Airway   Urgency: elective    Indications and Patient Condition  Indications for airway management: alec-procedural  Mask difficulty assessment: 0 - not attempted    Final Airway Details  Final airway type: supraglottic airway    Endotracheal Airway Details   Secured with: commercial tube gil and pink tape    Post intubation assessment   Placement verified by: capnometry, equal breath sounds and chest rise   Number of attempts at approach: 1  Secured with:commercial tube gil and pink tape  Ease of procedure: easy  Dentition: Intact and Unchanged

## 2020-11-09 NOTE — OR NURSING
Patient rating pain 9/10 after dilaudid. Dr. Hernández here and administered a nerve block. Pain now 4/10.

## 2020-11-09 NOTE — ADDENDUM NOTE
Addendum  created 11/09/20 1125 by Bean Hernández MD    Child order released for a procedure order, Clinical Note Signed, Intraprocedure Blocks edited, Intraprocedure Meds edited

## 2020-11-09 NOTE — ANESTHESIA PREPROCEDURE EVALUATION
"Anesthesia Pre-Procedure Evaluation    Patient: Reggie Owens   MRN: [id number] : 1960          Preoperative Diagnosis: Disorder of ankle [M25.9]    Procedure(s):  REVISION LEFT ANKLE FUSION WITH  CORRECTIVE OSTEOTOMY    Past Medical History:   Diagnosis Date     Obese      Other chronic pain      Sleep apnea      Past Surgical History:   Procedure Laterality Date     COLONOSCOPY       ENT SURGERY      T+A     ORTHOPEDIC SURGERY      rt knee replacement,lt ankle X2,, facial reconstruction       Anesthesia Evaluation     .             ROS/MED HX    ENT/Pulmonary:     (+)sleep apnea, uses CPAP , . .   (-) tobacco use and asthma   Neurologic:  - neg neurologic ROS     Cardiovascular:  - neg cardiovascular ROS      (-) CAD   METS/Exercise Tolerance:  >4 METS   Hematologic:  - neg hematologic  ROS       Musculoskeletal:   (+) arthritis,  -       GI/Hepatic:  - neg GI/hepatic ROS       Renal/Genitourinary:         Endo:     (+) Obesity, .   (-) Type I DM and Type II DM   Psychiatric:  - neg psychiatric ROS       Infectious Disease:         Malignancy:         Other:                                 No results found for: WBC, HGB, HCT, PLT, CRP, SED, NA, POTASSIUM, CHLORIDE, CO2, BUN, CR, GLC, MARYLIN, PHOS, MAG, ALBUMIN, PROTTOTAL, ALT, AST, GGT, ALKPHOS, BILITOTAL, BILIDIRECT, LIPASE, AMYLASE, CASPER, PTT, INR, FIBR, TSH, T4, T3, HCG, HCGS, CKTOTAL, CKMB, TROPN    Preop Vitals  BP Readings from Last 3 Encounters:   20 (!) 155/94    Pulse Readings from Last 3 Encounters:   No data found for Pulse      Resp Readings from Last 3 Encounters:   20 16    SpO2 Readings from Last 3 Encounters:   20 97%      Temp Readings from Last 1 Encounters:   20 36.4  C (97.5  F) (Temporal)    Ht Readings from Last 1 Encounters:   20 1.88 m (6' 2\")      Wt Readings from Last 1 Encounters:   20 106.3 kg (234 lb 4.8 oz)    Estimated body mass index is 30.08 kg/m  as calculated from the following:    " "Height as of this encounter: 1.88 m (6' 2\").    Weight as of this encounter: 106.3 kg (234 lb 4.8 oz).       Anesthesia Plan      History & Physical Review  History and physical reviewed and following examination; no interval change.    ASA Status:  2 .    NPO Status:  > 8 hours    Plan for General and Peripheral Nerve Block with Intravenous induction. Maintenance will be Balanced.    PONV prophylaxis:  Ondansetron (or other 5HT-3) and Dexamethasone or Solumedrol  LMA  Popliteal block.    The patient is not a current smoker      Postoperative Care  Postoperative pain management:  Multi-modal analgesia.      Consents  Anesthetic plan, risks, benefits and alternatives discussed with:  Patient.  Use of blood products discussed: No .   .                 Bean Hernández MD  "

## 2020-11-09 NOTE — ANESTHESIA CARE TRANSFER NOTE
Patient: Reggie Owens    Procedure(s):  REVISION LEFT ANKLE FUSION WITH  CORRECTIVE OSTEOTOMY    Diagnosis: Disorder of ankle [M25.9]  Diagnosis Additional Information: No value filed.    Anesthesia Type:   General, Peripheral Nerve Block     Note:  Airway :Face Mask  Patient transferred to:PACU  Comments: At end of procedure, spontaneous respirations, adequate tidal volumes, followed commands to voice, LMA removed atraumatically, oropharynx suctioned. Oxygen via facemask at 4 liters per minute to PACU. Oxygen tubing connected to wall O2 in PACU, SpO2, NiBP, and EKG monitors and alarms on and functioning, Semaj Hugger warmer connected to patient gown, report on patient's clinical status given to PACU RN.Handoff Report: Identifed the Patient, Identified the Reponsible Provider, Reviewed the pertinent medical history, Discussed the surgical course, Reviewed Intra-OP anesthesia mangement and issues during anesthesia, Set expectations for post-procedure period and Allowed opportunity for questions and acknowledgement of understanding      Vitals: (Last set prior to Anesthesia Care Transfer)    CRNA VITALS  11/9/2020 1001 - 11/9/2020 1033      11/9/2020             Pulse:  94    SpO2:  96 %    Resp Rate (set):  10                Electronically Signed By: CLAIRE Kendall CRNA  November 9, 2020  10:33 AM

## 2020-11-09 NOTE — OP NOTE
Procedure Date: 11/09/2020      PREOPERATIVE DIAGNOSIS:  Malunion of a previous left ankle fusion done elsewhere.      POSTOPERATIVE DIAGNOSIS:  Malunion of a previous left ankle fusion done elsewhere.      SURGICAL PROCEDURES:     1.  Corrective osteotomy and revision of the left ankle and TTC  fusion.   2.  Removal of plate and screws from the left ankle and subtalar joint.      ANESTHESIA:  General.      SURGEON:  Damaris Panda MD      ASSISTANT:  ALIDA Landeros      PREAMBLE:  Mr. Owens previously had an ankle and subtalar fusion done by Zachary Rasmussen DPM.  This was unfortunately not well-positioned and went onto a nonunion and a 20-degree varus malunion of the ankle, which caused him significant discomfort.      Informed consent was obtained for the above-mentioned procedure.      Two grams of Ancef was given prior to surgery.  There is no need for postoperative antibiotic prophylaxis.      DESCRIPTION OF PROCEDURE:  After adequate induction of a general anesthetic, the patient was positioned supine on the operating table.  The left leg was sterilely prepped and free draped in the usual fashion.  Tourniquet around the thigh was inflated to 300 mmHg.      The previous midline anterior incision was used over the ankle to expose the plate and screws.  The neurovascular bundle was protected.  The Arthrex plate and screws were removed.      A separate incision was made posterior over the heel and the large fragment compression screw removed from the subtalar joint.      A saw was then used to do a lateral closing wedge osteotomy through the nonunion and malunion of the ankle fusion.  A separate incision was made laterally over the fibula.  The lateral closing wedge osteotomy was done to correct the varus malalignment of the ankle.      An incision was then made plantar over the heel.  Under fluoroscopic guidance, a guide pin was inserted through the calcaneus and subtalar joint and across the ankle joint into  the tibia.  It was over-reamed to 13 mm diameter.  A Mikey T2 salbador was then impacted at 200 x 12 mm.      The salbador was impacted in place and then interlocking screws inserted first distal and later proximal for compression across the joint.  Excellent alignment and fixation was obtained.      The tourniquet was deflated.  Hemostasis obtained.  The wounds closed in layers.  A sterile dressing and a light compressive bandage were applied, followed by a cast splint.  He tolerated the procedure well and was taken to the recovery room in satisfactory condition.      He can ambulate partial weightbearing with crutches.  Sutures will be removed in 2 weeks.         HARRISON DYKES MD             D: 2020   T: 2020   MT: SEA      Name:     MANDO NEGRO   MRN:      [id number]        Account:        [id number]   :      1960           Procedure Date: 2020      Document: [id number]

## 2020-11-09 NOTE — ANESTHESIA PROCEDURE NOTES
Procedure note : Sciatic and Popliteal      Staff -   Anesthesiologist:  Bean Hernández MD  Performed By: anesthesiologist  Pre-Procedure  Performed by Bean Hernández MD  Location: pre-op      Pre-Anesthestic Checklist: patient identified, IV checked, site marked, risks and benefits discussed, informed consent, monitors and equipment checked, pre-op evaluation, at physician/surgeon's request and post-op pain management    Timeout  Correct Patient: Yes   Correct Procedure: Yes   Correct Site: Yes   Correct Laterality: Yes   Correct Position: Yes   Site Marked: Yes   .   Procedure Documentation    .    Procedure: Sciatic and Popliteal, left.   Patient Position:supine   Ultrasound used to identify targeted nerve, plexus, or vascular marker and placed a needle adjacent to it., Ultrasound was used to visualize the spread of the anesthetic in close proximity to the above stated nerve. A permanent image is entered into the patient's record.  Patient Prep/Sterile Barriers; mask, sterile gloves, chlorhexidine gluconate and isopropyl alcohol, patient draped.  .  Needle: short bevel   Needle Gauge: 21.    Needle Length (Inches) 4   Insertion Method: Single Shot.        Assessment/Narrative  Paresthesias: No.  Injection made incrementally with aspirations every 5 mL..  The placement was negative for: blood aspirated, painful injection and site bleeding.  Bolus given via..   Secured via.   Complications: none. Comments:  20 CC of 0.5 % Bupivacaine with 1/400k epinephrine injected easily around sciatic nerve.  No complications.    Ultrasound Interpretation, Peripheral Nerve Block    1. Under ultrasound guidance, the needle was inserted and placed in close proximity to the target nerve(s).  2. Ultrasound was also used to visualize the spread of the anesthetic in close proximity to the nerve(s) being blocked.  Local anesthetic was administered in incremental doses, with intermittent negative aspiration.    3. The  nerve(s) appeared anatomically normal.  4. There were no apparent abnormal pathological findings.  5. A permanent ultrasound image was saved in the patient's record.    Pt tolerated well.    No complications.      The surgeon has given a verbal order transferring care of this patient to me for the performance of a regional analgesia block for post-op pain control. It is requested of me because I am uniquely trained and qualified to perform this block and the surgeon is neither trained nor qualified to perform this procedure.

## 2020-11-09 NOTE — PROVIDER NOTIFICATION
Called ALIDA Nagy as pt did not have anything ordered for pain. PA ordered IV didaudid and oxycodone for pain.

## 2020-11-10 ENCOUNTER — APPOINTMENT (OUTPATIENT)
Dept: PHYSICAL THERAPY | Facility: CLINIC | Age: 60
End: 2020-11-10
Attending: PHYSICIAN ASSISTANT
Payer: COMMERCIAL

## 2020-11-10 VITALS
BODY MASS INDEX: 30.07 KG/M2 | HEIGHT: 74 IN | SYSTOLIC BLOOD PRESSURE: 135 MMHG | WEIGHT: 234.3 LBS | DIASTOLIC BLOOD PRESSURE: 80 MMHG | HEART RATE: 85 BPM | RESPIRATION RATE: 16 BRPM | OXYGEN SATURATION: 96 % | TEMPERATURE: 98.2 F

## 2020-11-10 PROCEDURE — 250N000013 HC RX MED GY IP 250 OP 250 PS 637: Performed by: ORTHOPAEDIC SURGERY

## 2020-11-10 PROCEDURE — 250N000013 HC RX MED GY IP 250 OP 250 PS 637: Performed by: PHYSICIAN ASSISTANT

## 2020-11-10 PROCEDURE — 97161 PT EVAL LOW COMPLEX 20 MIN: CPT | Mod: GP | Performed by: PHYSICAL THERAPIST

## 2020-11-10 PROCEDURE — 250N000011 HC RX IP 250 OP 636: Performed by: ORTHOPAEDIC SURGERY

## 2020-11-10 PROCEDURE — 97530 THERAPEUTIC ACTIVITIES: CPT | Mod: GP | Performed by: PHYSICAL THERAPIST

## 2020-11-10 RX ADMIN — HYDROXYZINE HYDROCHLORIDE 25 MG: 25 TABLET, FILM COATED ORAL at 04:17

## 2020-11-10 RX ADMIN — OXYCODONE HYDROCHLORIDE 10 MG: 5 TABLET ORAL at 03:59

## 2020-11-10 RX ADMIN — HYDROMORPHONE HYDROCHLORIDE 0.2 MG: 1 INJECTION, SOLUTION INTRAMUSCULAR; INTRAVENOUS; SUBCUTANEOUS at 03:09

## 2020-11-10 RX ADMIN — HYDROMORPHONE HYDROCHLORIDE 0.2 MG: 1 INJECTION, SOLUTION INTRAMUSCULAR; INTRAVENOUS; SUBCUTANEOUS at 04:17

## 2020-11-10 RX ADMIN — OXYCODONE HYDROCHLORIDE 10 MG: 5 TABLET ORAL at 07:24

## 2020-11-10 RX ADMIN — OXYCODONE HYDROCHLORIDE 10 MG: 5 TABLET ORAL at 10:12

## 2020-11-10 RX ADMIN — OXYCODONE HYDROCHLORIDE 5 MG: 5 TABLET ORAL at 01:08

## 2020-11-10 RX ADMIN — HYDROXYZINE HYDROCHLORIDE 25 MG: 25 TABLET, FILM COATED ORAL at 09:34

## 2020-11-10 NOTE — PLAN OF CARE
Up with assist of 1 and walker. Taking oxycodone and atarax for pain. Discharged home at 11:30, wife present for discharge teaching and given discharge medications.

## 2020-11-10 NOTE — PROGRESS NOTES
11/10/20 1052   Quick Adds   Type of Visit Initial PT Evaluation   Living Environment   People in home spouse   Current Living Arrangements house   Home Accessibility stairs to enter home;stairs within home   Number of Stairs, Main Entrance 3   Stair Railings, Main Entrance railing on left side (ascending)   Number of Stairs, Within Home, Primary none   Stair Railings, Within Home, Primary none   Transportation Anticipated car, drives self   Self-Care   Usual Activity Tolerance good   Current Activity Tolerance moderate   Regular Exercise No   Equipment Currently Used at Home none   Activity/Exercise/Self-Care Comment Pt owns knee scooter, FWW, and crutches   Disability/Function   Fall history within last six months no   General Information   Onset of Illness/Injury or Date of Surgery 11/09/20   Referring Physician Froilan López, PA-C   Patient/Family Therapy Goals Statement (PT) Return home.    Pertinent History of Current Problem (include personal factors and/or comorbidities that impact the POC) 61 y/o male POD # 1 L TTC fusion and hardware removal.    Existing Precautions/Restrictions fall   Weight-Bearing Status - LLE nonweight-bearing   General Observations Pt in supine upon arrival of therapist.    Cognition   Orientation Status (Cognition) oriented x 3   Affect/Mental Status (Cognition) WNL   Follows Commands (Cognition) WNL   Pain Assessment   Patient Currently in Pain   (L ankle pain at rest: 8/10)   Integumentary/Edema   Integumentary/Edema Comments L ankle covered with splint and ace bandage.    Posture    Posture Comments Noted forward head and shoulder posture sitting EOB and standing at FWW.    Range of Motion (ROM)   ROM Comment Limited L ankle ROM secondary to splint and recent procedure, otherwise B LEs WFL.    Strength   Strength Comments Not formally assessed, pt demonstrates at least 3/5 grossly in B LEs with functional mobility.    Bed Mobility   Comment (Bed Mobility) Supine <> sit  independently.    Transfers   Transfer Safety Comments Sit <> stand with FWW and CGA.    Gait/Stairs (Locomotion)   Comment (Gait/Stairs) Pt amb 5' with FWW and CGA.    Balance   Balance Comments Noted good seated and standing balance at FWW.    Clinical Impression   Criteria for Skilled Therapeutic Intervention yes, treatment indicated   PT Diagnosis (PT) Difficulty with functional mobility.    Influenced by the following impairments Pain, Decreased activity tolerance, Generalized weakness   Functional limitations due to impairments Limited functional mobility requiring AD and assist.    Clinical Presentation Stable/Uncomplicated   Clinical Presentation Rationale Based on PMH, current presentation, and social support.   Clinical Decision Making (Complexity) low complexity   Therapy Frequency (PT) Daily   Predicted Duration of Therapy Intervention (days/wks) 1 day   Planned Therapy Interventions (PT) bed mobility training;gait training;stair training;strengthening;transfer training   Risk & Benefits of therapy have been explained patient   PT Discharge Planning    PT Rationale for DC Rec SBA for transfers and ambulation with use of FWW/knee scooter and [place] to navigate stairs with use of railing and crutch   Total Evaluation Time   Total Evaluation Time (Minutes) 3

## 2020-11-10 NOTE — PROGRESS NOTES
Reggie Owens  11/10/2020  POD #1 L TTC fusion, hardware removal.    Doing well.  No immediate surgical complications identified.  No excessive bleeding  Pain well-controlled.  Temperatures:  Current - Temp: 98  F (36.7  C); Max - Temp  Av.4  F (36.3  C)  Min: 96.9  F (36.1  C)  Max: 98.3  F (36.8  C)  Pulse range: Pulse  Av.6  Min: 56  Max: 84  Blood pressure range: Systolic (24hrs), Av , Min:118 , Max:143   ; Diastolic (24hrs), Av, Min:76, Max:99    CMS: intact to L LE  Labs:none    PLAN:PT eval this AM, plan discharge home today.

## 2020-11-10 NOTE — PLAN OF CARE
Pt is A&Ox4, VSS on RA. Up w/SBA, walker, and gait belt, voiding in bathroom. Regular diet. CMS intact, LLE dressing with small amount of sanguinous drainage. Pain managed with PRN oxycodone x3 and PRN IV Dilaudid x2. IV SL. Potentially discharging home later today. Will continue to follow plan of care.

## 2020-11-10 NOTE — PLAN OF CARE
Patient vital signs are at baseline: Yes  Patient able to ambulate as they were prior to admission or with assist devices provided by therapies during their stay:  Yes  Patient MUST void prior to discharge:  Yes  Patient able to tolerate oral intake:  Yes  Pain has adequate pain control using Oral analgesics:  block in tact, pt does not having feeling in RLE.

## 2020-11-11 ENCOUNTER — COMMUNICATION - RIVER FALLS (OUTPATIENT)
Dept: FAMILY MEDICINE | Facility: CLINIC | Age: 60
End: 2020-11-11
Payer: COMMERCIAL

## 2021-02-08 ENCOUNTER — OFFICE VISIT - RIVER FALLS (OUTPATIENT)
Dept: FAMILY MEDICINE | Facility: CLINIC | Age: 61
End: 2021-02-08
Payer: COMMERCIAL

## 2021-02-08 ASSESSMENT — MIFFLIN-ST. JEOR: SCORE: 2018.48

## 2021-05-31 VITALS — BODY MASS INDEX: 30.8 KG/M2 | WEIGHT: 240 LBS | HEIGHT: 74 IN

## 2021-06-12 NOTE — ANESTHESIA PREPROCEDURE EVALUATION
Anesthesia Evaluation      Patient summary reviewed   No history of anesthetic complications     Airway   Mallampati: II  Neck ROM: full   Pulmonary - normal exam    breath sounds clear to auscultation  (+) sleep apnea on CPAP, ,                          Cardiovascular - negative ROS and normal exam  Exercise tolerance: > or = 4 METS  ECG reviewed  Rhythm: regular  Rate: normal,         Neuro/Psych - negative ROS     Endo/Other - negative ROS      GI/Hepatic/Renal - negative ROS   (-) GERD          Dental - normal exam                        Anesthesia Plan  Planned anesthetic: general LMA and peripheral nerve block    ASA 2   Induction: intravenous   Anesthetic plan and risks discussed with: patient  Anesthesia plan special considerations: antiemetics,   Post-op plan: routine recovery

## 2021-06-12 NOTE — ANESTHESIA POSTPROCEDURE EVALUATION
Patient: Reggie Owens  LEFT ANKLE FUSION, HARDWARE REMOVAL  Anesthesia type: general    Patient location: PACU  Last vitals:   Vitals:    09/06/17 1840   BP: 143/89   Pulse: 85   Resp: 14   Temp: 36.4  C (97.5  F)   SpO2: 94%     Post vital signs: stable  Level of consciousness: awake and responds to simple questions  Post-anesthesia pain: pain controlled  Post-anesthesia nausea and vomiting: no  Pulmonary: unassisted, return to baseline  Cardiovascular: stable and blood pressure at baseline  Hydration: adequate  Anesthetic events: no    QCDR Measures:  ASA# 11 - Tanja-op Cardiac Arrest: ASA11B - Patient did NOT experience unanticipated cardiac arrest  ASA# 12 - Tanja-op Mortality Rate: ASA12B - Patient did NOT die  ASA# 13 - PACU Re-Intubation Rate: ASA13B - Patient did NOT require a new airway mgmt  ASA# 10 - Composite Anes Safety: ASA10A - No serious adverse event    Additional Notes:

## 2021-06-12 NOTE — ANESTHESIA CARE TRANSFER NOTE
Last vitals:   Vitals:    09/06/17 1650   BP: (!) 156/98   Pulse: (!) 103   Resp: 14   Temp: 36.7  C (98  F)   SpO2: 99%     Patient's level of consciousness is drowsy  Spontaneous respirations: yes  Maintains airway independently: yes  Dentition unchanged: yes  Oropharynx: oropharynx clear of all foreign objects    QCDR Measures:  ASA# 20 - Surgical Safety Checklist: WHO surgical safety checklist completed prior to induction  PQRS# 430 - Adult PONV Prevention: 4558F - Pt received => 2 anti-emetic agents (different classes) preop & intraop  ASA# 8 - Peds PONV Prevention: NA - Not pediatric patient, not GA or 2 or more risk factors NOT present  PQRS# 424 - Tanja-op Temp Management: 4559F - At least one body temp DOCUMENTED => 35.5C or 95.9F within required timeframe  PQRS# 426 - PACU Transfer Protocol: - Transfer of care checklist used  ASA# 14 - Acute Post-op Pain: ASA14B - Patient did NOT experience pain >= 7 out of 10

## 2021-10-14 ENCOUNTER — OFFICE VISIT - RIVER FALLS (OUTPATIENT)
Dept: FAMILY MEDICINE | Facility: CLINIC | Age: 61
End: 2021-10-14
Payer: COMMERCIAL

## 2021-10-14 ASSESSMENT — MIFFLIN-ST. JEOR: SCORE: 2028.01

## 2021-10-15 ENCOUNTER — COMMUNICATION - RIVER FALLS (OUTPATIENT)
Dept: FAMILY MEDICINE | Facility: CLINIC | Age: 61
End: 2021-10-15
Payer: COMMERCIAL

## 2021-10-15 LAB
BUN SERPL-MCNC: 20 MG/DL (ref 7–25)
BUN/CREAT RATIO - HISTORICAL: NORMAL (ref 6–22)
CALCIUM SERPL-MCNC: 9.4 MG/DL (ref 8.6–10.3)
CHLORIDE BLD-SCNC: 106 MMOL/L (ref 98–110)
CHOLEST SERPL-MCNC: 183 MG/DL
CHOLEST/HDLC SERPL: 2.6 {RATIO}
CO2 SERPL-SCNC: 26 MMOL/L (ref 20–32)
CREAT SERPL-MCNC: 0.94 MG/DL (ref 0.7–1.25)
EGFRCR SERPLBLD CKD-EPI 2021: 87 ML/MIN/1.73M2
GLUCOSE BLD-MCNC: 98 MG/DL (ref 65–99)
HDLC SERPL-MCNC: 71 MG/DL
LDLC SERPL CALC-MCNC: 93 MG/DL
NONHDLC SERPL-MCNC: 112 MG/DL
POTASSIUM BLD-SCNC: 4.2 MMOL/L (ref 3.5–5.3)
PSA SERPL-MCNC: 2.73 NG/ML
SODIUM SERPL-SCNC: 142 MMOL/L (ref 135–146)
TRIGL SERPL-MCNC: 96 MG/DL

## 2021-11-30 ENCOUNTER — OFFICE VISIT - RIVER FALLS (OUTPATIENT)
Dept: FAMILY MEDICINE | Facility: CLINIC | Age: 61
End: 2021-11-30

## 2021-11-30 ENCOUNTER — LAB (OUTPATIENT)
Dept: LAB | Facility: CLINIC | Age: 61
End: 2021-11-30
Attending: ORTHOPAEDIC SURGERY
Payer: COMMERCIAL

## 2021-11-30 DIAGNOSIS — Z11.52 ENCOUNTER FOR PREOPERATIVE SCREENING LABORATORY TESTING FOR COVID-19 VIRUS: Primary | ICD-10-CM

## 2021-11-30 DIAGNOSIS — Z01.812 ENCOUNTER FOR PREOPERATIVE SCREENING LABORATORY TESTING FOR COVID-19 VIRUS: ICD-10-CM

## 2021-11-30 DIAGNOSIS — Z11.52 ENCOUNTER FOR PREOPERATIVE SCREENING LABORATORY TESTING FOR COVID-19 VIRUS: ICD-10-CM

## 2021-11-30 DIAGNOSIS — Z01.812 ENCOUNTER FOR PREOPERATIVE SCREENING LABORATORY TESTING FOR COVID-19 VIRUS: Primary | ICD-10-CM

## 2021-11-30 LAB — SARS-COV-2 RNA RESP QL NAA+PROBE: NEGATIVE

## 2021-11-30 PROCEDURE — U0005 INFEC AGEN DETEC AMPLI PROBE: HCPCS

## 2021-11-30 PROCEDURE — U0003 INFECTIOUS AGENT DETECTION BY NUCLEIC ACID (DNA OR RNA); SEVERE ACUTE RESPIRATORY SYNDROME CORONAVIRUS 2 (SARS-COV-2) (CORONAVIRUS DISEASE [COVID-19]), AMPLIFIED PROBE TECHNIQUE, MAKING USE OF HIGH THROUGHPUT TECHNOLOGIES AS DESCRIBED BY CMS-2020-01-R: HCPCS

## 2021-11-30 RX ORDER — IBUPROFEN 400 MG/1
400 TABLET, FILM COATED ORAL EVERY 6 HOURS PRN
COMMUNITY
End: 2024-05-28

## 2021-11-30 ASSESSMENT — MIFFLIN-ST. JEOR: SCORE: 2018.48

## 2021-11-30 NOTE — PROVIDER NOTIFICATION
Plans to discharge to home on POD 1 with his wife.       11/30/21 0940   Discharge Planning   Patient/Family Anticipates Transition to home with family   Living Arrangements   People in home spouse   Type of Residence Private Residence   Is your private residence a single family home or apartment? Single family home   Number of Stairs, Within Home, Primary 2   Once home, are you able to live on one level? Yes   Which level? Main Level   Equipment Currently Used at Home walker, standard;cane, straight  (Has cane and walker to use after surgery)   Support System   Support Systems Spouse/Significant Other   Do you have someone available to stay with you one or two nights once you are home? Yes   Education   Patient attended total joint pre-op class/received pre-op teaching  email/phone call

## 2021-12-01 ENCOUNTER — HOSPITAL ENCOUNTER (OUTPATIENT)
Facility: CLINIC | Age: 61
Discharge: HOME | End: 2021-12-02
Attending: ORTHOPAEDIC SURGERY | Admitting: ORTHOPAEDIC SURGERY
Payer: COMMERCIAL

## 2021-12-01 ENCOUNTER — ANESTHESIA EVENT (OUTPATIENT)
Dept: SURGERY | Facility: CLINIC | Age: 61
End: 2021-12-01
Payer: COMMERCIAL

## 2021-12-01 ENCOUNTER — ANESTHESIA (OUTPATIENT)
Dept: SURGERY | Facility: CLINIC | Age: 61
End: 2021-12-01
Payer: COMMERCIAL

## 2021-12-01 DIAGNOSIS — T84.018A FAILED TOTAL KNEE ARTHROPLASTY, INITIAL ENCOUNTER (H): Primary | ICD-10-CM

## 2021-12-01 DIAGNOSIS — Z96.659 FAILED TOTAL KNEE ARTHROPLASTY, INITIAL ENCOUNTER (H): Primary | ICD-10-CM

## 2021-12-01 PROBLEM — M19.90 OA (OSTEOARTHRITIS): Status: ACTIVE | Noted: 2021-12-01

## 2021-12-01 PROCEDURE — 250N000011 HC RX IP 250 OP 636: Performed by: NURSE ANESTHETIST, CERTIFIED REGISTERED

## 2021-12-01 PROCEDURE — 370N000017 HC ANESTHESIA TECHNICAL FEE, PER MIN: Performed by: ORTHOPAEDIC SURGERY

## 2021-12-01 PROCEDURE — C1713 ANCHOR/SCREW BN/BN,TIS/BN: HCPCS | Performed by: ORTHOPAEDIC SURGERY

## 2021-12-01 PROCEDURE — 258N000003 HC RX IP 258 OP 636: Performed by: ORTHOPAEDIC SURGERY

## 2021-12-01 PROCEDURE — 250N000013 HC RX MED GY IP 250 OP 250 PS 637: Performed by: PHYSICIAN ASSISTANT

## 2021-12-01 PROCEDURE — 360N000078 HC SURGERY LEVEL 5, PER MIN: Performed by: ORTHOPAEDIC SURGERY

## 2021-12-01 PROCEDURE — 250N000009 HC RX 250: Performed by: NURSE ANESTHETIST, CERTIFIED REGISTERED

## 2021-12-01 PROCEDURE — 278N000051 HC OR IMPLANT GENERAL: Performed by: ORTHOPAEDIC SURGERY

## 2021-12-01 PROCEDURE — 710N000009 HC RECOVERY PHASE 1, LEVEL 1, PER MIN: Performed by: ORTHOPAEDIC SURGERY

## 2021-12-01 PROCEDURE — 250N000011 HC RX IP 250 OP 636: Performed by: ANESTHESIOLOGY

## 2021-12-01 PROCEDURE — 258N000003 HC RX IP 258 OP 636: Performed by: NURSE ANESTHETIST, CERTIFIED REGISTERED

## 2021-12-01 PROCEDURE — 999N000141 HC STATISTIC PRE-PROCEDURE NURSING ASSESSMENT: Performed by: ORTHOPAEDIC SURGERY

## 2021-12-01 PROCEDURE — 250N000011 HC RX IP 250 OP 636: Performed by: ORTHOPAEDIC SURGERY

## 2021-12-01 PROCEDURE — 250N000009 HC RX 250: Performed by: PHYSICIAN ASSISTANT

## 2021-12-01 PROCEDURE — 250N000013 HC RX MED GY IP 250 OP 250 PS 637: Performed by: ORTHOPAEDIC SURGERY

## 2021-12-01 PROCEDURE — 250N000009 HC RX 250: Performed by: ANESTHESIOLOGY

## 2021-12-01 PROCEDURE — 272N000001 HC OR GENERAL SUPPLY STERILE: Performed by: ORTHOPAEDIC SURGERY

## 2021-12-01 PROCEDURE — 250N000013 HC RX MED GY IP 250 OP 250 PS 637: Performed by: ANESTHESIOLOGY

## 2021-12-01 PROCEDURE — 250N000011 HC RX IP 250 OP 636: Performed by: PHYSICIAN ASSISTANT

## 2021-12-01 PROCEDURE — 258N000003 HC RX IP 258 OP 636: Performed by: ANESTHESIOLOGY

## 2021-12-01 DEVICE — CEMENT BONE SIMPLEX HV 40G W/GENTA .5G: Type: IMPLANTABLE DEVICE | Site: KNEE | Status: FUNCTIONAL

## 2021-12-01 DEVICE — IMPLANTABLE DEVICE: Type: IMPLANTABLE DEVICE | Site: KNEE | Status: FUNCTIONAL

## 2021-12-01 RX ORDER — NALOXONE HYDROCHLORIDE 0.4 MG/ML
0.4 INJECTION, SOLUTION INTRAMUSCULAR; INTRAVENOUS; SUBCUTANEOUS
Status: DISCONTINUED | OUTPATIENT
Start: 2021-12-01 | End: 2021-12-02 | Stop reason: HOSPADM

## 2021-12-01 RX ORDER — CEFAZOLIN SODIUM 2 G/100ML
2 INJECTION, SOLUTION INTRAVENOUS EVERY 8 HOURS
Status: COMPLETED | OUTPATIENT
Start: 2021-12-01 | End: 2021-12-02

## 2021-12-01 RX ORDER — ACETAMINOPHEN 325 MG/1
975 TABLET ORAL ONCE
Status: COMPLETED | OUTPATIENT
Start: 2021-12-01 | End: 2021-12-01

## 2021-12-01 RX ORDER — OXYCODONE HYDROCHLORIDE 5 MG/1
10 TABLET ORAL EVERY 4 HOURS PRN
Status: DISCONTINUED | OUTPATIENT
Start: 2021-12-01 | End: 2021-12-02 | Stop reason: HOSPADM

## 2021-12-01 RX ORDER — LIDOCAINE 40 MG/G
CREAM TOPICAL
Status: DISCONTINUED | OUTPATIENT
Start: 2021-12-01 | End: 2021-12-01

## 2021-12-01 RX ORDER — OXYCODONE HYDROCHLORIDE 5 MG/1
5 TABLET ORAL EVERY 4 HOURS PRN
Status: DISCONTINUED | OUTPATIENT
Start: 2021-12-01 | End: 2021-12-02 | Stop reason: HOSPADM

## 2021-12-01 RX ORDER — ROSUVASTATIN CALCIUM 10 MG/1
10 TABLET, COATED ORAL AT BEDTIME
Status: DISCONTINUED | OUTPATIENT
Start: 2021-12-01 | End: 2021-12-02 | Stop reason: HOSPADM

## 2021-12-01 RX ORDER — ASPIRIN 81 MG/1
81 TABLET ORAL 2 TIMES DAILY
Qty: 60 TABLET | Refills: 0 | Status: SHIPPED | OUTPATIENT
Start: 2021-12-01 | End: 2022-10-04

## 2021-12-01 RX ORDER — NALOXONE HYDROCHLORIDE 0.4 MG/ML
0.2 INJECTION, SOLUTION INTRAMUSCULAR; INTRAVENOUS; SUBCUTANEOUS
Status: DISCONTINUED | OUTPATIENT
Start: 2021-12-01 | End: 2021-12-02 | Stop reason: HOSPADM

## 2021-12-01 RX ORDER — FENTANYL CITRATE 50 UG/ML
25 INJECTION, SOLUTION INTRAMUSCULAR; INTRAVENOUS EVERY 5 MIN PRN
Status: DISCONTINUED | OUTPATIENT
Start: 2021-12-01 | End: 2021-12-01 | Stop reason: HOSPADM

## 2021-12-01 RX ORDER — ASPIRIN 81 MG/1
81 TABLET ORAL 2 TIMES DAILY
Status: DISCONTINUED | OUTPATIENT
Start: 2021-12-01 | End: 2021-12-02 | Stop reason: HOSPADM

## 2021-12-01 RX ORDER — OXYMETAZOLINE HYDROCHLORIDE 0.05 G/100ML
1 SPRAY NASAL AT BEDTIME
COMMUNITY

## 2021-12-01 RX ORDER — ONDANSETRON 2 MG/ML
4 INJECTION INTRAMUSCULAR; INTRAVENOUS EVERY 6 HOURS PRN
Status: DISCONTINUED | OUTPATIENT
Start: 2021-12-01 | End: 2021-12-02 | Stop reason: HOSPADM

## 2021-12-01 RX ORDER — HYDROMORPHONE HCL IN WATER/PF 6 MG/30 ML
0.2 PATIENT CONTROLLED ANALGESIA SYRINGE INTRAVENOUS
Status: DISCONTINUED | OUTPATIENT
Start: 2021-12-01 | End: 2021-12-02 | Stop reason: HOSPADM

## 2021-12-01 RX ORDER — FENTANYL CITRATE 50 UG/ML
50 INJECTION, SOLUTION INTRAMUSCULAR; INTRAVENOUS
Status: DISCONTINUED | OUTPATIENT
Start: 2021-12-01 | End: 2021-12-01

## 2021-12-01 RX ORDER — AMOXICILLIN 250 MG
1 CAPSULE ORAL 2 TIMES DAILY
Status: DISCONTINUED | OUTPATIENT
Start: 2021-12-01 | End: 2021-12-02 | Stop reason: HOSPADM

## 2021-12-01 RX ORDER — LIDOCAINE 40 MG/G
CREAM TOPICAL
Status: DISCONTINUED | OUTPATIENT
Start: 2021-12-01 | End: 2021-12-02 | Stop reason: HOSPADM

## 2021-12-01 RX ORDER — ONDANSETRON 2 MG/ML
INJECTION INTRAMUSCULAR; INTRAVENOUS PRN
Status: DISCONTINUED | OUTPATIENT
Start: 2021-12-01 | End: 2021-12-01

## 2021-12-01 RX ORDER — PROPOFOL 10 MG/ML
INJECTION, EMULSION INTRAVENOUS PRN
Status: DISCONTINUED | OUTPATIENT
Start: 2021-12-01 | End: 2021-12-01

## 2021-12-01 RX ORDER — ACETAMINOPHEN 325 MG/1
650 TABLET ORAL EVERY 4 HOURS PRN
Status: DISCONTINUED | OUTPATIENT
Start: 2021-12-04 | End: 2021-12-02 | Stop reason: HOSPADM

## 2021-12-01 RX ORDER — SODIUM CHLORIDE, SODIUM LACTATE, POTASSIUM CHLORIDE, CALCIUM CHLORIDE 600; 310; 30; 20 MG/100ML; MG/100ML; MG/100ML; MG/100ML
INJECTION, SOLUTION INTRAVENOUS CONTINUOUS
Status: DISCONTINUED | OUTPATIENT
Start: 2021-12-01 | End: 2021-12-02 | Stop reason: HOSPADM

## 2021-12-01 RX ORDER — ACETAMINOPHEN 325 MG/1
650 TABLET ORAL EVERY 4 HOURS PRN
Qty: 60 TABLET | Refills: 0 | COMMUNITY
Start: 2021-12-01 | End: 2023-10-10

## 2021-12-01 RX ORDER — KETAMINE HYDROCHLORIDE 50 MG/ML
INJECTION, SOLUTION INTRAMUSCULAR; INTRAVENOUS PRN
Status: DISCONTINUED | OUTPATIENT
Start: 2021-12-01 | End: 2021-12-01

## 2021-12-01 RX ORDER — HYDROMORPHONE HCL IN WATER/PF 6 MG/30 ML
0.4 PATIENT CONTROLLED ANALGESIA SYRINGE INTRAVENOUS
Status: DISCONTINUED | OUTPATIENT
Start: 2021-12-01 | End: 2021-12-02 | Stop reason: HOSPADM

## 2021-12-01 RX ORDER — FENTANYL CITRATE 50 UG/ML
25 INJECTION, SOLUTION INTRAMUSCULAR; INTRAVENOUS
Status: DISCONTINUED | OUTPATIENT
Start: 2021-12-01 | End: 2021-12-01

## 2021-12-01 RX ORDER — DEXAMETHASONE SODIUM PHOSPHATE 10 MG/ML
INJECTION, SOLUTION INTRAMUSCULAR; INTRAVENOUS PRN
Status: DISCONTINUED | OUTPATIENT
Start: 2021-12-01 | End: 2021-12-01

## 2021-12-01 RX ORDER — PROPOFOL 10 MG/ML
INJECTION, EMULSION INTRAVENOUS CONTINUOUS PRN
Status: DISCONTINUED | OUTPATIENT
Start: 2021-12-01 | End: 2021-12-01

## 2021-12-01 RX ORDER — PROCHLORPERAZINE MALEATE 10 MG
10 TABLET ORAL EVERY 6 HOURS PRN
Status: DISCONTINUED | OUTPATIENT
Start: 2021-12-01 | End: 2021-12-02 | Stop reason: HOSPADM

## 2021-12-01 RX ORDER — BUPIVACAINE HYDROCHLORIDE 7.5 MG/ML
INJECTION, SOLUTION INTRASPINAL
Status: COMPLETED | OUTPATIENT
Start: 2021-12-01 | End: 2021-12-01

## 2021-12-01 RX ORDER — SODIUM CHLORIDE, SODIUM LACTATE, POTASSIUM CHLORIDE, CALCIUM CHLORIDE 600; 310; 30; 20 MG/100ML; MG/100ML; MG/100ML; MG/100ML
INJECTION, SOLUTION INTRAVENOUS CONTINUOUS
Status: DISCONTINUED | OUTPATIENT
Start: 2021-12-01 | End: 2021-12-01

## 2021-12-01 RX ORDER — CEFAZOLIN SODIUM 2 G/100ML
2 INJECTION, SOLUTION INTRAVENOUS SEE ADMIN INSTRUCTIONS
Status: DISCONTINUED | OUTPATIENT
Start: 2021-12-01 | End: 2021-12-02 | Stop reason: HOSPADM

## 2021-12-01 RX ORDER — ACETAMINOPHEN 325 MG/1
975 TABLET ORAL ONCE
Status: DISCONTINUED | OUTPATIENT
Start: 2021-12-01 | End: 2021-12-01

## 2021-12-01 RX ORDER — SODIUM CHLORIDE, SODIUM LACTATE, POTASSIUM CHLORIDE, CALCIUM CHLORIDE 600; 310; 30; 20 MG/100ML; MG/100ML; MG/100ML; MG/100ML
INJECTION, SOLUTION INTRAVENOUS CONTINUOUS
Status: DISCONTINUED | OUTPATIENT
Start: 2021-12-01 | End: 2021-12-01 | Stop reason: HOSPADM

## 2021-12-01 RX ORDER — CEFAZOLIN SODIUM 2 G/100ML
2 INJECTION, SOLUTION INTRAVENOUS
Status: COMPLETED | OUTPATIENT
Start: 2021-12-01 | End: 2021-12-01

## 2021-12-01 RX ORDER — TRANEXAMIC ACID 650 MG/1
1950 TABLET ORAL ONCE
Status: COMPLETED | OUTPATIENT
Start: 2021-12-01 | End: 2021-12-01

## 2021-12-01 RX ORDER — OXYCODONE HYDROCHLORIDE 5 MG/1
5 TABLET ORAL EVERY 4 HOURS PRN
Status: DISCONTINUED | OUTPATIENT
Start: 2021-12-01 | End: 2021-12-01 | Stop reason: HOSPADM

## 2021-12-01 RX ORDER — BUPIVACAINE HYDROCHLORIDE 5 MG/ML
INJECTION, SOLUTION EPIDURAL; INTRACAUDAL
Status: COMPLETED | OUTPATIENT
Start: 2021-12-01 | End: 2021-12-01

## 2021-12-01 RX ORDER — POLYETHYLENE GLYCOL 3350 17 G/17G
17 POWDER, FOR SOLUTION ORAL DAILY
Status: DISCONTINUED | OUTPATIENT
Start: 2021-12-02 | End: 2021-12-02 | Stop reason: HOSPADM

## 2021-12-01 RX ORDER — HYDROMORPHONE HCL IN WATER/PF 6 MG/30 ML
0.2 PATIENT CONTROLLED ANALGESIA SYRINGE INTRAVENOUS EVERY 5 MIN PRN
Status: DISCONTINUED | OUTPATIENT
Start: 2021-12-01 | End: 2021-12-01 | Stop reason: HOSPADM

## 2021-12-01 RX ORDER — ACETAMINOPHEN 325 MG/1
975 TABLET ORAL EVERY 8 HOURS
Status: DISCONTINUED | OUTPATIENT
Start: 2021-12-01 | End: 2021-12-02 | Stop reason: HOSPADM

## 2021-12-01 RX ORDER — SODIUM CHLORIDE, SODIUM LACTATE, POTASSIUM CHLORIDE, CALCIUM CHLORIDE 600; 310; 30; 20 MG/100ML; MG/100ML; MG/100ML; MG/100ML
INJECTION, SOLUTION INTRAVENOUS CONTINUOUS PRN
Status: DISCONTINUED | OUTPATIENT
Start: 2021-12-01 | End: 2021-12-01

## 2021-12-01 RX ORDER — BISACODYL 10 MG
10 SUPPOSITORY, RECTAL RECTAL DAILY PRN
Status: DISCONTINUED | OUTPATIENT
Start: 2021-12-01 | End: 2021-12-02 | Stop reason: HOSPADM

## 2021-12-01 RX ORDER — ONDANSETRON 4 MG/1
4 TABLET, ORALLY DISINTEGRATING ORAL EVERY 30 MIN PRN
Status: DISCONTINUED | OUTPATIENT
Start: 2021-12-01 | End: 2021-12-01 | Stop reason: HOSPADM

## 2021-12-01 RX ORDER — ONDANSETRON 4 MG/1
4 TABLET, ORALLY DISINTEGRATING ORAL EVERY 6 HOURS PRN
Status: DISCONTINUED | OUTPATIENT
Start: 2021-12-01 | End: 2021-12-02 | Stop reason: HOSPADM

## 2021-12-01 RX ORDER — ONDANSETRON 2 MG/ML
4 INJECTION INTRAMUSCULAR; INTRAVENOUS EVERY 30 MIN PRN
Status: DISCONTINUED | OUTPATIENT
Start: 2021-12-01 | End: 2021-12-01 | Stop reason: HOSPADM

## 2021-12-01 RX ADMIN — SODIUM CHLORIDE, POTASSIUM CHLORIDE, SODIUM LACTATE AND CALCIUM CHLORIDE: 600; 310; 30; 20 INJECTION, SOLUTION INTRAVENOUS at 20:11

## 2021-12-01 RX ADMIN — SENNOSIDES AND DOCUSATE SODIUM 1 TABLET: 50; 8.6 TABLET ORAL at 20:12

## 2021-12-01 RX ADMIN — ONDANSETRON 4 MG: 2 INJECTION INTRAMUSCULAR; INTRAVENOUS at 17:17

## 2021-12-01 RX ADMIN — ROSUVASTATIN CALCIUM 10 MG: 10 TABLET, FILM COATED ORAL at 20:12

## 2021-12-01 RX ADMIN — KETAMINE HYDROCHLORIDE 15 MG: 50 INJECTION, SOLUTION INTRAMUSCULAR; INTRAVENOUS at 15:08

## 2021-12-01 RX ADMIN — SODIUM CHLORIDE, POTASSIUM CHLORIDE, SODIUM LACTATE AND CALCIUM CHLORIDE: 600; 310; 30; 20 INJECTION, SOLUTION INTRAVENOUS at 16:11

## 2021-12-01 RX ADMIN — PROPOFOL 100 MCG/KG/MIN: 10 INJECTION, EMULSION INTRAVENOUS at 15:07

## 2021-12-01 RX ADMIN — SODIUM CHLORIDE, POTASSIUM CHLORIDE, SODIUM LACTATE AND CALCIUM CHLORIDE: 600; 310; 30; 20 INJECTION, SOLUTION INTRAVENOUS at 13:15

## 2021-12-01 RX ADMIN — CEFAZOLIN SODIUM 2 G: 2 INJECTION, SOLUTION INTRAVENOUS at 22:08

## 2021-12-01 RX ADMIN — MIDAZOLAM HYDROCHLORIDE 1 MG: 1 INJECTION, SOLUTION INTRAMUSCULAR; INTRAVENOUS at 14:31

## 2021-12-01 RX ADMIN — BUPIVACAINE HYDROCHLORIDE 10 ML: 5 INJECTION, SOLUTION EPIDURAL; INTRACAUDAL; PERINEURAL at 14:35

## 2021-12-01 RX ADMIN — KETAMINE HYDROCHLORIDE 35 MG: 50 INJECTION, SOLUTION INTRAMUSCULAR; INTRAVENOUS at 15:35

## 2021-12-01 RX ADMIN — TRANEXAMIC ACID 1950 MG: 650 TABLET ORAL at 13:52

## 2021-12-01 RX ADMIN — BUPIVACAINE HYDROCHLORIDE 4 ML: 2.5 INJECTION, SOLUTION EPIDURAL; INFILTRATION; INTRACAUDAL at 14:31

## 2021-12-01 RX ADMIN — DEXAMETHASONE SODIUM PHOSPHATE 10 MG: 10 INJECTION, SOLUTION INTRAMUSCULAR; INTRAVENOUS at 15:38

## 2021-12-01 RX ADMIN — CEFAZOLIN SODIUM 2 G: 2 INJECTION, SOLUTION INTRAVENOUS at 15:03

## 2021-12-01 RX ADMIN — MIDAZOLAM HYDROCHLORIDE 1 MG: 1 INJECTION, SOLUTION INTRAMUSCULAR; INTRAVENOUS at 14:33

## 2021-12-01 RX ADMIN — ACETAMINOPHEN 975 MG: 325 TABLET ORAL at 22:06

## 2021-12-01 RX ADMIN — SODIUM CHLORIDE, POTASSIUM CHLORIDE, SODIUM LACTATE AND CALCIUM CHLORIDE: 600; 310; 30; 20 INJECTION, SOLUTION INTRAVENOUS at 13:21

## 2021-12-01 RX ADMIN — OXYCODONE HYDROCHLORIDE 5 MG: 5 TABLET ORAL at 19:46

## 2021-12-01 RX ADMIN — BUPIVACAINE HYDROCHLORIDE IN DEXTROSE 1.6 ML: 7.5 INJECTION, SOLUTION SUBARACHNOID at 15:12

## 2021-12-01 RX ADMIN — ACETAMINOPHEN 975 MG: 325 TABLET ORAL at 13:52

## 2021-12-01 RX ADMIN — ASPIRIN 81 MG: 81 TABLET, COATED ORAL at 20:12

## 2021-12-01 RX ADMIN — PROPOFOL 20 MG: 10 INJECTION, EMULSION INTRAVENOUS at 16:50

## 2021-12-01 ASSESSMENT — MIFFLIN-ST. JEOR: SCORE: 2003.74

## 2021-12-01 NOTE — ANESTHESIA PROCEDURE NOTES
Intrathecal Procedure Note    Pre-Procedure   Staff -        Anesthesiologist:  Jason Sarabia MD       Performed By: anesthesiologist       Location: OR       Procedure Start/Stop Times: 12/1/2021 3:09 PM and 12/1/2021 3:12 PM       Pre-Anesthestic Checklist: patient identified, IV checked, risks and benefits discussed, informed consent, monitors and equipment checked, pre-op evaluation and at physician/surgeon's request  Timeout:       Correct Patient: Yes        Correct Procedure: Yes        Correct Site: Yes        Correct Position: Yes   Procedure Documentation  Procedure: intrathecal       Patient Position: sitting       Skin prep: Chloraprep       Insertion Site: L3-4. (midline approach).       Needle Gauge: 24.        Needle Length (Inches): 4        Spinal Needle Type: Pencan       Introducer used       Introducer: 20 G      # of attempts: 1 and  # of redirects:     Assessment/Narrative         Paresthesias: No.       CSF fluid: clear.    Medication(s) Administered   0.75% Hyperbaric Bupivacaine (Intrathecal), 1.6 mL  Medication Administration Time: 12/1/2021 3:12 PM

## 2021-12-01 NOTE — BRIEF OP NOTE
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  Complications: None.  Implants:   Implant Name Type Inv. Item Serial No.  Lot No. LRB No. Used Action   Tibial plate, bearing and Poly      Right 1 Explanted   Simplex HV with Gentamycin Full dose     [id number] Right 2 Implanted   Partial Tibia    NED [id number] Right 1 Implanted   Polyethylene partial articular surface     [id number] Right 1 Implanted

## 2021-12-01 NOTE — ANESTHESIA CARE TRANSFER NOTE
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    Anesthesia Type:   Spinal     Note:    Oropharynx: oropharynx clear of all foreign objects  Level of Consciousness: awake  Oxygen Supplementation: face mask  Level of Supplemental Oxygen (L/min / FiO2): 6  Independent Airway: airway patency satisfactory and stable  Dentition: dentition unchanged  Vital Signs Stable: post-procedure vital signs reviewed and stable  Report to RN Given: handoff report given  Patient transferred to: PACU    Handoff Report: Identifed the Patient, Identified the Reponsible Provider, Reviewed the pertinent medical history, Discussed the surgical course, Reviewed Intra-OP anesthesia mangement and issues during anesthesia, Set expectations for post-procedure period and Allowed opportunity for questions and acknowledgement of understanding      Vitals:  Vitals Value Taken Time   /68 12/01/21 1741   Temp 36.7  C (98.1  F) 12/01/21 1741   Pulse 79 12/01/21 1741   Resp 22 12/01/21 1741   SpO2 98 % 12/01/21 1741       Electronically Signed By: CLAIRE Lala CRNA  December 1, 2021  5:42 PM

## 2021-12-01 NOTE — ANESTHESIA PREPROCEDURE EVALUATION
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]    Procedure : Procedure(s):  RIGHT KNEE REVISION POLYETHYLENE EXCHANGE  VERSUS TOTAL KNEE ARTHROPLASTY          Past Medical History:   Diagnosis Date     Obese      Other chronic pain      Sleep apnea       Past Surgical History:   Procedure Laterality Date     ANKLE ARTHRODESIS Left 2017     ARTHRODESIS ANKLE Left 2020    Procedure: REVISION LEFT ANKLE FUSION WITH;  Surgeon: Damaris Panda MD;  Location: [facility] OR     ARTHRODESIS ANKLE Left 2017    Procedure: LEFT ANKLE FUSION;  Surgeon: Gil Rasmussen DPM;  Location: [facility] Main OR;  Service:      C FUSION FOOT BONES,SUBTALAR Left 2016    Procedure: Left Ankle Open Debridement Left Subtalar Fusion, Peroneal Debridement and Tenodeses, Augment Graft;  Surgeon: Rochelle Verduzco MD;  Location: [facility] Main OR;  Service: Orthopedics     COLONOSCOPY       ENT SURGERY      T+A     MANDIBLE FRACTURE SURGERY       ORTHOPEDIC SURGERY      rt knee replacement,lt ankle X2,, facial reconstruction     OSTEOTOMY ANKLE Left 2020    Procedure: CORRECTIVE OSTEOTOMY;  Surgeon: Damaris Panda MD;  Location:  OR     REMOVE HARDWARE LOWER EXTREMITY Left 2017    Procedure: HARDWARE REMOVAL;  Surgeon: Gil Rasmussen DPM;  Location: [facility] Main OR;  Service:      TOTAL KNEE ARTHROPLASTY Right       No Known Allergies   Social History     Tobacco Use     Smoking status: Never Smoker     Smokeless tobacco: Former User     Types: Snuff   Substance Use Topics     Alcohol use: Yes     Comment: social      Wt Readings from Last 1 Encounters:   21 113.4 kg (250 lb)        Anesthesia Evaluation   Pt has had prior anesthetic.     No history of anesthetic complications       ROS/MED HX  ENT/Pulmonary:     (+) sleep apnea,     Neurologic:  - neg neurologic ROS      Cardiovascular:  - neg cardiovascular ROS  (-) murmur and wheezes   METS/Exercise Tolerance: >4 METS    Hematologic:  - neg hematologic  ROS     Musculoskeletal:       GI/Hepatic:  - neg GI/hepatic ROS     Renal/Genitourinary:  - neg Renal ROS     Endo:     (+) Obesity,     Psychiatric/Substance Use:  - neg psychiatric ROS     Infectious Disease:  - neg infectious disease ROS     Malignancy:  - neg malignancy ROS     Other:  - neg other ROS    (+) , H/O Chronic Pain,        Physical Exam    Airway  airway exam normal      Mallampati: II   TM distance: > 3 FB   Neck ROM: full   Mouth opening: > 3 cm    Respiratory Devices and Support         Dental  no notable dental history         Cardiovascular          Rhythm and rate: regular and normal (-) no murmur    Pulmonary           breath sounds clear to auscultation   (-) no wheezes        OUTSIDE LABS:  CBC: No results found for: WBC, HGB, HCT, PLT  BMP: No results found for: NA, POTASSIUM, CHLORIDE, CO2, BUN, CR, GLC  COAGS: No results found for: PTT, INR, FIBR  POC: No results found for: BGM, HCG, HCGS  HEPATIC: No results found for: ALBUMIN, PROTTOTAL, ALT, AST, GGT, ALKPHOS, BILITOTAL, BILIDIRECT, CASPER  OTHER: No results found for: PH, LACT, A1C, MARYLIN, PHOS, MAG, LIPASE, AMYLASE, TSH, T4, T3, CRP, SED    Anesthesia Plan    ASA Status:  2   NPO Status:  NPO Appropriate    Anesthesia Type: Spinal.   Induction: Intravenous, Propofol.   Maintenance: Balanced.        Consents    Anesthesia Plan(s) and associated risks, benefits, and realistic alternatives discussed. Questions answered and patient/representative(s) expressed understanding.    - Discussed:     - Discussed with:  Patient      - Patient is DNR/DNI Status: No         Postoperative Care    Pain management: IV analgesics, Oral pain medications.   PONV prophylaxis: Ondansetron (or other 5HT-3), Dexamethasone or Solumedrol     Comments:    Other Comments: Plan for adductor canal block and selective tibial in  pre op area with spinal anesthesia in OR. I discussed the risks and benefits of this plan with the patient. All of the patient's questions were sufficiently answered and he/she is agreeable to this plan for surgery.              Jason Sarabia MD

## 2021-12-01 NOTE — PHARMACY-ADMISSION MEDICATION HISTORY
Pharmacy Note - Admission Medication History    Pertinent Provider Information: Patient states not allergic to oxycodone, only had bad dreams a couple of times from use.   ______________________________________________________________________    Prior To Admission (PTA) med list completed and updated in EMR.       PTA Med List   Medication Sig Last Dose     ibuprofen (ADVIL/MOTRIN) 400 MG tablet Take 400 mg by mouth every 6 hours as needed for moderate pain 11/30/2021 at Unknown time     multivitamin w/minerals (MULTI-VITAMIN) tablet Take 1 tablet by mouth daily 11/30/2021 at Unknown time     oxymetazoline (AFRIN) 0.05 % nasal spray Spray 1 spray into both nostrils At Bedtime 11/30/2021 at has with     rosuvastatin (CRESTOR) 10 MG tablet Take 10 mg by mouth At Bedtime 11/30/2021 at Unknown time       Information source(s): [facility], [facility]    Method of interview communication: in-person    Patient was asked about OTC/herbal products specifically.  PTA med list reflects this.    Based on the pharmacist's assessment, the PTA med list information appears reliable    Allergies were reviewed, assessed, and updated with the patient.      Medications available for use during hospital stay: Oxymetazoline.      Thank you for the opportunity to participate in the care of this patient.      Kosta Cifuentes RPH     12/1/2021     1:43 PM

## 2021-12-01 NOTE — ANESTHESIA PROCEDURE NOTES
Adductor canal Procedure Note    Pre-Procedure   Staff -        Anesthesiologist:  Jason Sarabia MD       Performed By: anesthesiologist       Location: pre-op       Procedure Start/Stop Times: 12/1/2021 2:32 PM and 12/1/2021 2:35 PM       Pre-Anesthestic Checklist: patient identified, IV checked, site marked, risks and benefits discussed, informed consent, monitors and equipment checked, pre-op evaluation, at physician/surgeon's request and post-op pain management  Timeout:       Correct Patient: Yes        Correct Procedure: Yes        Correct Site: Yes        Correct Position: Yes        Correct Laterality: Yes        Site Marked: Yes  Procedure Documentation  Procedure: Adductor canal       Laterality: right       Patient Position: supine       Patient Prep/Sterile Barriers: sterile gloves, mask       Skin prep: Chloraprep       Needle Type: short bevel       Needle Gauge: 21.        Needle Length (Inches): 4        Ultrasound guided       1. Ultrasound was used to identify targeted nerve, plexus, vascular marker, or fascial plane and place a needle adjacent to it in real-time.       2. Ultrasound was used to visualize the spread of anesthetic in close proximity to the above referenced structure.       3. A permanent image is entered into the patient's record.       4. The visualized anatomic structures appeared normal.       5. There were no apparent abnormal pathologic findings.    Assessment/Narrative         The placement was negative for: blood aspirated, painful injection and site bleeding       Paresthesias: No.     Bolus given via needle..        Secured via.        Insertion/Infusion Method: Single Shot       Complications: none       Injection made incrementally with aspirations every 5 mL.    Medication(s) Administered   Bupivacaine 0.5% PF (Infiltration), 10 mL  Medication Administration Time: 12/1/2021 2:35 PM

## 2021-12-01 NOTE — ANESTHESIA PROCEDURE NOTES
Tibial Procedure Note    Pre-Procedure   Staff -        Anesthesiologist:  Jason Sarabia MD       Performed By: anesthesiologist       Location: pre-op       Procedure Start/Stop Times: 12/1/2021 2:27 PM and 12/1/2021 2:31 PM       Pre-Anesthestic Checklist: patient identified, IV checked, site marked, risks and benefits discussed, informed consent, monitors and equipment checked, pre-op evaluation, at physician/surgeon's request and post-op pain management  Timeout:       Correct Patient: Yes        Correct Procedure: Yes        Correct Site: Yes        Correct Position: Yes        Correct Laterality: Yes        Site Marked: Yes  Procedure Documentation  Procedure: Tibial       Laterality: right       Patient Position: supine       Patient Prep/Sterile Barriers: sterile gloves, mask       Skin prep: Chloraprep       Needle Type: short bevel       Needle Gauge: 21.        Needle Length (Inches): 4        Ultrasound guided       1. Ultrasound was used to identify targeted nerve, plexus, vascular marker, or fascial plane and place a needle adjacent to it in real-time.       2. Ultrasound was used to visualize the spread of anesthetic in close proximity to the above referenced structure.       3. A permanent image is entered into the patient's record.       4. The visualized anatomic structures appeared normal.       5. There were no apparent abnormal pathologic findings.    Assessment/Narrative         The placement was negative for: blood aspirated, painful injection and site bleeding       Paresthesias: No.     Bolus given via needle..        Secured via.        Insertion/Infusion Method: Single Shot       Complications: none       Injection made incrementally with aspirations every 5 mL.

## 2021-12-02 ENCOUNTER — APPOINTMENT (OUTPATIENT)
Dept: PHYSICAL THERAPY | Facility: CLINIC | Age: 61
End: 2021-12-02
Attending: ORTHOPAEDIC SURGERY
Payer: COMMERCIAL

## 2021-12-02 VITALS
SYSTOLIC BLOOD PRESSURE: 134 MMHG | RESPIRATION RATE: 18 BRPM | HEIGHT: 74 IN | BODY MASS INDEX: 32.08 KG/M2 | HEART RATE: 62 BPM | TEMPERATURE: 97.5 F | DIASTOLIC BLOOD PRESSURE: 84 MMHG | WEIGHT: 250 LBS | OXYGEN SATURATION: 93 %

## 2021-12-02 LAB
FASTING STATUS PATIENT QL REPORTED: NORMAL
GLUCOSE BLD-MCNC: 123 MG/DL (ref 70–125)
HGB BLD-MCNC: 14.5 G/DL (ref 13.3–17.7)

## 2021-12-02 PROCEDURE — 250N000013 HC RX MED GY IP 250 OP 250 PS 637: Performed by: ORTHOPAEDIC SURGERY

## 2021-12-02 PROCEDURE — 97161 PT EVAL LOW COMPLEX 20 MIN: CPT | Mod: GP

## 2021-12-02 PROCEDURE — 85018 HEMOGLOBIN: CPT | Performed by: ORTHOPAEDIC SURGERY

## 2021-12-02 PROCEDURE — 36415 COLL VENOUS BLD VENIPUNCTURE: CPT | Performed by: ORTHOPAEDIC SURGERY

## 2021-12-02 PROCEDURE — 250N000011 HC RX IP 250 OP 636: Performed by: ORTHOPAEDIC SURGERY

## 2021-12-02 PROCEDURE — 82947 ASSAY GLUCOSE BLOOD QUANT: CPT | Performed by: ORTHOPAEDIC SURGERY

## 2021-12-02 PROCEDURE — 97116 GAIT TRAINING THERAPY: CPT | Mod: GP

## 2021-12-02 RX ORDER — AMOXICILLIN 250 MG
1 CAPSULE ORAL 2 TIMES DAILY
Refills: 0 | COMMUNITY
Start: 2021-12-02 | End: 2022-10-04

## 2021-12-02 RX ORDER — OXYCODONE HYDROCHLORIDE 5 MG/1
5 TABLET ORAL EVERY 4 HOURS PRN
Qty: 10 TABLET | Refills: 0 | Status: SHIPPED | OUTPATIENT
Start: 2021-12-02 | End: 2022-10-04

## 2021-12-02 RX ADMIN — ASPIRIN 81 MG: 81 TABLET, COATED ORAL at 08:16

## 2021-12-02 RX ADMIN — CEFAZOLIN SODIUM 2 G: 2 INJECTION, SOLUTION INTRAVENOUS at 06:34

## 2021-12-02 RX ADMIN — OXYCODONE HYDROCHLORIDE 5 MG: 5 TABLET ORAL at 00:02

## 2021-12-02 RX ADMIN — ACETAMINOPHEN 975 MG: 325 TABLET ORAL at 06:34

## 2021-12-02 RX ADMIN — SENNOSIDES AND DOCUSATE SODIUM 1 TABLET: 50; 8.6 TABLET ORAL at 08:16

## 2021-12-02 RX ADMIN — OXYCODONE HYDROCHLORIDE 5 MG: 5 TABLET ORAL at 04:07

## 2021-12-02 NOTE — PLAN OF CARE
Patient vital signs are at baseline: Yes  Patient able to ambulate as they were prior to admission or with assist devices provided by therapies during their stay:  Yes  Patient MUST void prior to discharge:  Yes  Patient able to tolerate oral intake:  Yes  Pain has adequate pain control using Oral analgesics:  Yes    Pain under control with medications. Voiding adequately. Tolerating regular diet. VSS.     Catie Love RN

## 2021-12-02 NOTE — PROGRESS NOTES
Occupational Therapy: Orders received. Chart reviewed and discussed with care team.? Occupational Therapy not indicated due to pt indep and does not feel need for OT.? Defer discharge recommendations to PT/RN.? Will complete orders.     Katie Méndez OTR/L

## 2021-12-02 NOTE — PROGRESS NOTES
Discharged to home with spouse. Stated understanding of discharge instructions. Checked room for belongings. Stated that he has his belongings.

## 2021-12-02 NOTE — PLAN OF CARE
Patient vital signs are at baseline: Yes  Patient able to ambulate as they were prior to admission or with assist devices provided by therapies during their stay:  Yes  Patient MUST void prior to discharge:  Yes  Patient able to tolerate oral intake:  Yes  Pain has adequate pain control using Oral analgesics:  Yes    Pt is alert & pleasant & able to make needs known. He had a revision of a unicompartmental right knee arthroplasty. The first surgery was approximately 2 years ago and a piece came loose internally and lodged inside pt's joint. The surgeon was able to use much of the original parts so this was a minimally invasive procedure. Pt has walked and voided & his pain is reportedly under adequate control. Likely discharge tomorrow

## 2021-12-02 NOTE — DISCHARGE INSTRUCTIONS
Eloy Wade MD    Attending    Since 12/1/2021    [phone]   To be seen in 2 weeks     Kimo Benitez, PA    General - Family Medicine    [phone]   It is recommended to follow up with your primary care provider in 7 days

## 2021-12-02 NOTE — PLAN OF CARE
Physical Therapy Discharge Summary    Reason for therapy discharge:    All goals and outcomes met, no further needs identified.    Progress towards therapy goal(s). See goals on Care Plan in [facility] electronic health record for goal details.  Goals met    Therapy recommendation(s):    Continue home exercise program.

## 2021-12-02 NOTE — PROGRESS NOTES
[facility]      OUTPATIENT PHYSICAL THERAPY EVALUATION  PLAN OF TREATMENT FOR OUTPATIENT REHABILITATION  (COMPLETE FOR INITIAL CLAIMS ONLY)  Patient's Last Name, First Name, M.I.  YOB: 1960  RomanReggie  GARRICK                        Provider's Name  [facility] Medical Record No.  [id number]                               Onset Date:  (P) 12/01/21   Start of Care Date:  (P) 12/02/21      Type:     _X_PT   ___OT   ___SLP Medical Diagnosis:  (P) S/p R uniknee revision                        PT Diagnosis:  (P) Impaired functional mobility   Visits from SOC:  1   _________________________________________________________________________________  Plan of Treatment/Functional Goals    Planned Interventions: (P) gait training,home exercise program,stair training     Goals: See Physical Therapy Goals on Care Plan in [employer] electronic health record.    Therapy Frequency: (P) One time eval and treatment only  Predicted Duration of Therapy Intervention: (P) 1 day  _________________________________________________________________________________    I CERTIFY THE NEED FOR THESE SERVICES FURNISHED UNDER        THIS PLAN OF TREATMENT AND WHILE UNDER MY CARE     (Physician co-signature of this document indicates review and certification of the therapy plan).                Certification date from: (P) 12/02/21, Certification date to: (P) 12/30/21    Referring Physician: (P) Dr. Elyo Wade            Initial Assessment        See Physical Therapy evaluation dated (P) 12/02/21 in Epic electronic health record.

## 2021-12-02 NOTE — CONSULTS
Will not see today:  PAIN MANAGEMENT SERVICE CHART CHECK    This patient's chart has been reviewed by the Pain Service. Discussed with Ortho, patient doing well. Ortho will cancel consult. If you would like the patient to be seen, please contact the service and ask to have the patient seen.    Thank you!      Livier RANGEL, FNP-C  Acute Care Pain Management Program  [facility] ([place], [place], [place])  Monday-Friday 8a-4p   Page via online paging system  or call [phone]

## 2021-12-02 NOTE — PROGRESS NOTES
"[facility] Orthopaedics Progress Note      Post-operative Day: 1 Day Post-Op    Procedure(s):  RIGHT KNEE UNI REVISION WITH POLYETHYLENE EXCHANGE        Plan: Anticoagulation protocol: ASA 81 mg BID x 30  days            Pain medications:  Oxycodone, tylenol            Weight bearing status: WBAT            Disposition:  This morning after therapies            Continue cares and rehabilitation     Subjective:  Reggie reports feeling well. He is awaiting therapy clearance.   Pain: minimal  Chest pain, SOB:  No  Denies lightheadedness/dizziness  Denies nausea/ vomiting  Passing flatus  voiding well  Has walked in the tim overnight and this morning.   Had hypertension immediately post op, but this has improved.     Objective:  Blood pressure 134/84, pulse 62, temperature 97.5  F (36.4  C), temperature source Oral, resp. rate 18, height 1.88 m (6' 2\"), weight 113.4 kg (250 lb), SpO2 93 %.    Patient Vitals for the past 24 hrs:   BP Temp Temp src Pulse Resp SpO2 Height Weight   12/02/21 0735 134/84 97.5  F (36.4  C) Oral 62 18 93 % -- --   12/02/21 0358 120/66 97.4  F (36.3  C) Oral 66 18 93 % -- --   12/01/21 2330 124/77 97.2  F (36.2  C) Oral 69 18 92 % -- --   12/01/21 2130 (!) 144/86 97.3  F (36.3  C) Oral 80 18 92 % -- --   12/01/21 2030 132/85 97.4  F (36.3  C) Oral 77 18 91 % -- --   12/01/21 1930 133/79 97.3  F (36.3  C) Oral 72 18 92 % -- --   12/01/21 1900 124/76 97.4  F (36.3  C) Oral 63 18 94 % -- --   12/01/21 1845 -- -- -- -- -- 95 % -- --   12/01/21 1830 (!) 155/87 97.1  F (36.2  C) Temporal 68 19 94 % -- --   12/01/21 1820 (!) 156/104 -- -- 68 16 92 % -- --   12/01/21 1810 (!) 149/88 -- -- 65 17 91 % -- --   12/01/21 1800 136/82 -- -- 72 23 92 % -- --   12/01/21 1750 126/74 -- -- 79 18 94 % -- --   12/01/21 1741 129/68 98.1  F (36.7  C) Temporal 79 22 98 % -- --   12/01/21 1700 129/68 98.1  F (36.7  C) Temporal 78 18 98 % -- --   12/01/21 1455 (!) 144/101 -- -- 61 22 99 % -- --   12/01/21 1450 (!) " "163/114 -- -- 70 20 98 % -- --   12/01/21 1445 (!) 139/93 -- -- 64 20 100 % -- --   12/01/21 1440 (!) 142/89 -- -- 61 18 99 % -- --   12/01/21 1435 (!) 150/94 -- -- 63 19 99 % -- --   12/01/21 1430 (!) 150/92 -- -- 63 26 97 % -- --   12/01/21 1324 (!) 158/95 98.3  F (36.8  C) Temporal 63 18 97 % 1.88 m (6' 2\") 113.4 kg (250 lb)       Wt Readings from Last 4 Encounters:   12/01/21 113.4 kg (250 lb)   11/09/20 106.3 kg (234 lb 4.8 oz)   08/30/17 108.9 kg (240 lb)   06/29/16 106.6 kg (235 lb)         Motor function, sensation, and circulation intact   Yes  Wound status: Aquacel is clean, without shadowing, dry, and intact. Yes  Calf tenderness: Bilateral  No    Pertinent Labs   Lab Results: personally reviewed.     Recent Labs   Lab Test 12/02/21  0527   HGB 14.5       Report completed by:  Facundo La NP  Date: 12/2/2021        "

## 2021-12-02 NOTE — DISCHARGE SUMMARY
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    Following the procedure noted above the patient was transferred to the post-op floor and started on:    Therapy:  physical therapy and occupational therapy  Anticoagulation Plan: ASA 81mg BID  for 30 days  Pain Management: oxycodone and tylenol  Weight bearing status: Weight bearing as tolerated     The patient was followed by Orthopedics during the inpatient treatment course:  Complications:  None  Additional consultations:  None     Pertinent Labs   Lab Results: personally reviewed.     Recent Labs   Lab Test 12/02/21  0527   HGB 14.5          Discharge Information:  Condition at discharge: Stable  Discharge destination:  Discharged to home     Medications at discharge:  Current Discharge Medication List      START taking these medications    Details   acetaminophen (TYLENOL) 325 MG tablet Take 2 tablets (650 mg) by mouth every 4 hours as needed for other (mild pain)  Qty: 60 tablet, Refills: 0    Associated Diagnoses: Failed total knee arthroplasty, initial encounter (H)      aspirin 81 MG EC tablet Take 1 tablet (81 mg) by mouth 2 times daily  Qty: 60 tablet, Refills: 0    Associated Diagnoses: Failed total knee arthroplasty, initial encounter (H)      oxyCODONE (ROXICODONE) 5 MG tablet Take 1  tablet (5 mg) by mouth every 4 hours as needed for moderate to severe pain  Qty: 10 tablet, Refills: 0    Comments: Take 1 pill for moderate pain (4-6/10) and 2 pills for severe pain (7-10/10). Alternate with tylenol.      senna-docusate (SENOKOT-S/PERICOLACE) 8.6-50 MG tablet Take 1 tablet by mouth 2 times daily  Refills: 0    Comments: Take while taking narcotics medications and until bowels are back to normal routine. Hold for loose stools         CONTINUE these medications which have NOT CHANGED    Details   ibuprofen (ADVIL/MOTRIN) 400 MG tablet Take 400 mg by mouth every 6 hours as needed for moderate pain      multivitamin w/minerals (MULTI-VITAMIN) tablet Take 1 tablet by mouth daily      oxymetazoline (AFRIN) 0.05 % nasal spray Spray 1 spray into both nostrils At Bedtime      rosuvastatin (CRESTOR) 10 MG tablet Take 10 mg by mouth At Bedtime                        Follow-Up Care:  Patient should be seen in the office in 10-14 days by the Orthopedic Surgeon/Physician Assistant.  Call [phone] for appointment or questions.    It was my pleasure to take care of the above patient.  Facundo La NP

## 2021-12-02 NOTE — ANESTHESIA POSTPROCEDURE EVALUATION
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    Anesthesia Type:  Spinal    Note:  Disposition: Inpatient   Postop Pain Control: Uneventful            Sign Out: Well controlled pain   PONV: No   Neuro/Psych: Uneventful            Sign Out: Acceptable/Baseline neuro status   Airway/Respiratory: Uneventful            Sign Out: Acceptable/Baseline resp. status   CV/Hemodynamics: Uneventful            Sign Out: Acceptable CV status; No obvious hypovolemia; No obvious fluid overload   Other NRE: NONE   DID A NON-ROUTINE EVENT OCCUR? No           Last vitals:  Vitals Value Taken Time   /87 12/01/21 1830   Temp 36.2  C (97.1  F) 12/01/21 1830   Pulse 70 12/01/21 1838   Resp 20 12/01/21 1838   SpO2 94 % 12/01/21 1838   Vitals shown include unvalidated device data.    Electronically Signed By: Casper Georges MD  December 1, 2021  6:51 PM

## 2021-12-02 NOTE — PLAN OF CARE
Patient vital signs are at baseline: Yes  Patient able to ambulate as they were prior to admission or with assist devices provided by therapies during their stay:  Yes  Patient MUST void prior to discharge:  Yes  Patient able to tolerate oral intake:  Yes  Pain has adequate pain control using Oral analgesics:  Yes    Patient discharging home today.

## 2021-12-02 NOTE — PROGRESS NOTES
12/02/21 0911   Quick Adds   Quick Adds Certification   Type of Visit Initial PT Evaluation   Living Environment   People in home spouse   Current Living Arrangements house   Home Accessibility stairs to enter home   Number of Stairs, Main Entrance 3   Stair Railings, Main Entrance railing on left side (ascending)   Living Environment Comments Able to live on main level   Self-Care   Equipment Currently Used at Home none   Activity/Exercise/Self-Care Comment Pt owns FWW and crutches   General Information   Onset of Illness/Injury or Date of Surgery 12/01/21   Referring Physician Dr. Eloy Wade   Patient/Family Therapy Goals Statement (PT) Go Home   Pertinent History of Current Problem (include personal factors and/or comorbidities that impact the POC) S/P R uniknee revision   Weight-Bearing Status - RLE weight-bearing as tolerated   Cognition   Orientation Status (Cognition) oriented x 4   Transfers   Transfers sit-stand transfer   Sit-Stand Transfer   Sit-Stand [place] (Transfers) independent   Assistive Device (Sit-Stand Transfers) walker, front-wheeled   Gait/Stairs (Locomotion)   [place] Level (Gait) verbal cues;supervision   Assistive Device (Gait) walker, front-wheeled   Distance in Feet (Required for LE Total Joints) 75, 200   Pattern (Gait) step-through   Deviations/Abnormal Patterns (Gait) gait speed decreased  (flexed posture)   Maintains Weight-bearing Status (Gait) able to maintain   Clinical Impression   Criteria for Skilled Therapeutic Intervention yes, treatment indicated   PT Diagnosis (PT) Impaired functional mobility   Influenced by the following impairments weakness, pain   Functional limitations due to impairments gait, stairs   Clinical Presentation Stable/Uncomplicated   Clinical Presentation Rationale Pt presents as medically diagnosed   Clinical Decision Making (Complexity) low complexity   Therapy Frequency (PT) One time eval and treatment only   Predicted Duration of  Therapy Intervention (days/wks) 1 day   Planned Therapy Interventions (PT) gait training;home exercise program;stair training   Anticipated Equipment Needs at Discharge (PT) walker, rolling   Risk & Benefits of therapy have been explained evaluation/treatment results reviewed;care plan/treatment goals reviewed;patient   PT Discharge Planning    PT Discharge Recommendation (DC Rec) home with assist   PT Rationale for DC Rec Safe ambulation and stair negotiation, support from spouse at home   Therapy Certification   Start of care date 12/02/21   Certification date from 12/02/21   Certification date to 12/30/21   Medical Diagnosis S/p R uniknee revision   Total Evaluation Time   Total Evaluation Time (Minutes) 5

## 2021-12-02 NOTE — OP NOTE
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    OPERATION:  Right knee revision uni-arthroplasty to fixed-bearing uni-arthroplasty.    ANESTHESIA:  Spinal.     PREP:  Routine.    SURGEON:  Eloy Wade MD.     ASSISTANT:  Ollie Cheng PA-C.    A PA was needed for this case secondary to exposure needs.    INDICATIONS FOR PROCEDURE:  Mr. Owens is a 61-year-old gentleman who previously had an [place] uni-arthroplasty done by one of my partners.  He has had some intermittent discomfort and pain.  I saw him about a year ago where he had what appeared to be a loose tibial component.  We just opted to observe it because he was having minimal problems.  However, last week, he twisted, felt something pop, and he was unable to extend the knee.  He had x-rays, which showed dislodgement of his mobile bearing [place] uni-arthroplasty with polyethylene.  Risks and benefits discussed regarding surgical treatment.  We did talk about converting this to a fixed-bearing uni-arthroplasty versus a total knee arthroplasty.    DESCRIPTION OF PROCEDURE:  The patient was brought to the operating room, placed supine, given a spinal anesthetic, and was given perioperative antibiotics.  The right leg was then elevated, prepped, and draped in normal sterile fashion.  Appropriate timeout was completed and the tourniquet was inflated.     The previous incision was opened up and a medial parapatellar approach was then completed.  He had a small hemarthrosis, which was evacuated.  The polyethylene was completely dislodged anteriorly, which was removed.  At this point in time, I went ahead and irrigated again.  The tibial component was obviously grossly loose.  It was removed with minimal bone loss.  Next, I  went ahead and visualized the femur and felt it was stable.  I copiously irrigated.  I felt I was able, with his bone quality, to continue with a fixed-bearing uni-arthroplasty.  I went ahead and placed a jig into the proximal tibia and increased his slope, which he really had minimal.  I got down to very good bone.  I used an 8 tibia, and I went ahead pinned that with a 14 insert, had matched flexion and extension gaps, and was very happy with the glide of the femur on the tibia.  There were no other concerns identified.    The trial implants were removed.  I prepped the tibia using antibiotic-laden cement.  The size H Persona baseplate was impacted.  The size 14 insert was then impacted.  The knee was then brought into extension.  The cement had been removed posteriorly.  I went ahead and put pressure on the knee at 20 degrees until the cement cured.  The tourniquet was released.  There were no other problems identified.  At this point in time, it was identified there were no concerns.  The tibia was very well fixed and appeared to have flexion and extension gaps, which were matched.  The knee was then copiously irrigated again.  Used the remainder prior to an orthopedic injection, a pericapsular injection.  Retinaculum closed with #1 Vicryl suture, 2-0 Vicryl, and a Monocryl stitch.  The patient was then placed in a sterile compressive dressing and brought to recovery in stable condition.  All sponge and needle counts were correct.  No intraoperative complications identified.  Blood loss was minimal.  The patient will be weightbearing as tolerated and discharged tomorrow.    Eloy Wade MD        D: 2021   T: 2021   MT: [id number]    Name:     FRANCESCOS MANDO STANLEY  MRN:      [id number]        Account:        [id number]   :      1960           Procedure Date: 2021     Document: [id number]

## 2021-12-22 ENCOUNTER — OFFICE VISIT - RIVER FALLS (OUTPATIENT)
Dept: FAMILY MEDICINE | Facility: CLINIC | Age: 61
End: 2021-12-22
Payer: COMMERCIAL

## 2021-12-22 ASSESSMENT — MIFFLIN-ST. JEOR: SCORE: 2023.02

## 2022-01-05 ENCOUNTER — COMMUNICATION - RIVER FALLS (OUTPATIENT)
Dept: FAMILY MEDICINE | Facility: CLINIC | Age: 62
End: 2022-01-05
Payer: COMMERCIAL

## 2022-01-08 LAB
HCT VFR BLD AUTO: 47.5 %
HGB BLD-MCNC: 15.7 G/DL
PLATELET # BLD AUTO: 282 X10^3/UL
WBC # BLD AUTO: 6.34 X10^3/UL

## 2022-02-11 VITALS
DIASTOLIC BLOOD PRESSURE: 90 MMHG | TEMPERATURE: 97.7 F | WEIGHT: 250 LBS | TEMPERATURE: 97.9 F | HEART RATE: 76 BPM | HEIGHT: 74 IN | DIASTOLIC BLOOD PRESSURE: 82 MMHG | SYSTOLIC BLOOD PRESSURE: 136 MMHG | BODY MASS INDEX: 31.44 KG/M2 | HEART RATE: 80 BPM | SYSTOLIC BLOOD PRESSURE: 146 MMHG | BODY MASS INDEX: 31.89 KG/M2 | OXYGEN SATURATION: 96 % | BODY MASS INDEX: 32.08 KG/M2 | SYSTOLIC BLOOD PRESSURE: 126 MMHG | TEMPERATURE: 97.9 F | HEIGHT: 74 IN | WEIGHT: 245 LBS | HEART RATE: 78 BPM | HEIGHT: 74 IN | DIASTOLIC BLOOD PRESSURE: 84 MMHG

## 2022-02-11 VITALS
WEIGHT: 250.4 LBS | BODY MASS INDEX: 32.14 KG/M2 | SYSTOLIC BLOOD PRESSURE: 138 MMHG | HEIGHT: 74 IN | HEART RATE: 80 BPM | DIASTOLIC BLOOD PRESSURE: 80 MMHG

## 2022-02-11 VITALS
BODY MASS INDEX: 32.73 KG/M2 | BODY MASS INDEX: 32.85 KG/M2 | WEIGHT: 255 LBS | HEART RATE: 86 BPM | HEIGHT: 74 IN | HEIGHT: 74 IN | HEART RATE: 70 BPM | SYSTOLIC BLOOD PRESSURE: 178 MMHG | DIASTOLIC BLOOD PRESSURE: 78 MMHG | SYSTOLIC BLOOD PRESSURE: 132 MMHG | DIASTOLIC BLOOD PRESSURE: 108 MMHG | WEIGHT: 256 LBS

## 2022-02-11 VITALS
DIASTOLIC BLOOD PRESSURE: 86 MMHG | TEMPERATURE: 97.5 F | HEART RATE: 80 BPM | BODY MASS INDEX: 32.73 KG/M2 | OXYGEN SATURATION: 95 % | WEIGHT: 255 LBS | HEIGHT: 74 IN | SYSTOLIC BLOOD PRESSURE: 146 MMHG

## 2022-02-11 VITALS
BODY MASS INDEX: 32.67 KG/M2 | OXYGEN SATURATION: 97 % | TEMPERATURE: 97 F | HEART RATE: 74 BPM | HEIGHT: 74 IN | SYSTOLIC BLOOD PRESSURE: 134 MMHG | DIASTOLIC BLOOD PRESSURE: 82 MMHG | WEIGHT: 254.6 LBS

## 2022-02-11 VITALS
DIASTOLIC BLOOD PRESSURE: 88 MMHG | DIASTOLIC BLOOD PRESSURE: 89 MMHG | HEIGHT: 74 IN | WEIGHT: 253 LBS | BODY MASS INDEX: 31.95 KG/M2 | SYSTOLIC BLOOD PRESSURE: 139 MMHG | HEIGHT: 74 IN | SYSTOLIC BLOOD PRESSURE: 136 MMHG | HEART RATE: 77 BPM | WEIGHT: 249 LBS | BODY MASS INDEX: 32.47 KG/M2 | TEMPERATURE: 97.5 F | HEART RATE: 80 BPM | TEMPERATURE: 98.4 F

## 2022-02-11 VITALS
SYSTOLIC BLOOD PRESSURE: 142 MMHG | BODY MASS INDEX: 30.8 KG/M2 | DIASTOLIC BLOOD PRESSURE: 80 MMHG | HEART RATE: 70 BPM | WEIGHT: 240 LBS | OXYGEN SATURATION: 92 % | HEIGHT: 74 IN

## 2022-02-11 VITALS
DIASTOLIC BLOOD PRESSURE: 78 MMHG | WEIGHT: 244.8 LBS | SYSTOLIC BLOOD PRESSURE: 120 MMHG | HEART RATE: 82 BPM | BODY MASS INDEX: 31.42 KG/M2 | OXYGEN SATURATION: 95 % | TEMPERATURE: 98.1 F | HEIGHT: 74 IN

## 2022-02-11 VITALS
BODY MASS INDEX: 33 KG/M2 | DIASTOLIC BLOOD PRESSURE: 94 MMHG | HEART RATE: 69 BPM | HEIGHT: 74 IN | WEIGHT: 257.1 LBS | SYSTOLIC BLOOD PRESSURE: 158 MMHG

## 2022-02-11 VITALS
OXYGEN SATURATION: 97 % | HEIGHT: 74 IN | BODY MASS INDEX: 31.7 KG/M2 | DIASTOLIC BLOOD PRESSURE: 88 MMHG | SYSTOLIC BLOOD PRESSURE: 148 MMHG | WEIGHT: 247 LBS | HEART RATE: 88 BPM | TEMPERATURE: 98.2 F

## 2022-02-11 VITALS
OXYGEN SATURATION: 93 % | BODY MASS INDEX: 31.7 KG/M2 | SYSTOLIC BLOOD PRESSURE: 126 MMHG | DIASTOLIC BLOOD PRESSURE: 80 MMHG | HEART RATE: 80 BPM | HEIGHT: 74 IN | WEIGHT: 247 LBS

## 2022-02-11 VITALS
BODY MASS INDEX: 32.03 KG/M2 | HEART RATE: 76 BPM | HEIGHT: 74 IN | WEIGHT: 249.6 LBS | TEMPERATURE: 97.4 F | DIASTOLIC BLOOD PRESSURE: 82 MMHG | SYSTOLIC BLOOD PRESSURE: 122 MMHG

## 2022-02-11 VITALS
HEART RATE: 70 BPM | HEIGHT: 74 IN | BODY MASS INDEX: 32.24 KG/M2 | TEMPERATURE: 97.1 F | DIASTOLIC BLOOD PRESSURE: 80 MMHG | WEIGHT: 251.2 LBS | SYSTOLIC BLOOD PRESSURE: 136 MMHG

## 2022-02-16 NOTE — NURSING NOTE
Depression Screening Entered On:  11/19/2021 10:17 AM CST    Performed On:  10/14/2021 10:11 AM CDT by Sharyn Jordan               Depression Screening   Little Interest - Pleasure in Activities :   Not at all   Feeling Down, Depressed, Hopeless :   Not at all   Initial Depression Screen Score :   0 Score   Poor Appetite or Overeating :   Not at all   Trouble Falling or Staying Asleep :   Not at all   Feeling Tired or Little Energy :   Not at all   Feeling Bad About Yourself :   Not at all   Trouble Concentrating :   Not at all   Moving or Speaking Slowly :   Not at all   Thoughts Better Off Dead or Hurting Self :   Not at all   Difficulty at Work, Home, Getting Along :   Not difficult at all   Detailed Depression Screen Score :   0    Total Depression Screen Score :   0    Sharyn Jordan - 11/19/2021 10:11 AM CST

## 2022-02-16 NOTE — NURSING NOTE
Comprehensive Intake Entered On:  1/2/2020 10:21 AM CST    Performed On:  1/2/2020 10:13 AM CST by Gia River MA               Summary   Chief Complaint :   Follow up knee surgery    Advance Directive :   Yes   Menstrual Status :   N/A   Height Measured :   73.5 in(Converted to: 6 ft 1 in, 186.69 cm)    Ht/Wt Measurement Refused by Patient? :   Yes   Systolic Blood Pressure :   136 mmHg (HI)    Diastolic Blood Pressure :   82 mmHg (HI)    Mean Arterial Pressure :   100 mmHg   Peripheral Pulse Rate :   80 bpm   BP Site :   Right arm   Pulse Site :   Radial artery   BP Method :   Manual   HR Method :   Manual   Temperature Tympanic :   97.9 DegF(Converted to: 36.6 DegC)    Gia River MA - 1/2/2020 10:13 AM CST   Health Status   Allergies Verified? :   Yes   Medication History Verified? :   Yes   Immunizations Current :   Yes   Medical History Verified? :   Yes   Pre-Visit Planning Status :   Completed   Tobacco Use? :   Never smoker   Gia River MA - 1/2/2020 10:13 AM CST   Consents   Consent for Immunization Exchange :   Consent Granted   Consent for Immunizations to Providers :   Consent Granted   Gia River MA - 1/2/2020 10:13 AM CST   Meds / Allergies   (As Of: 1/2/2020 10:21:00 AM CST)   Allergies (Active)   No Known Medication Allergies  Estimated Onset Date:   Unspecified ; Created By:   Daly Amaro CMA; Reaction Status:   Active ; Category:   Drug ; Substance:   No Known Medication Allergies ; Type:   Allergy ; Updated By:   Daly Amaro CMA; Reviewed Date:   1/2/2020 10:18 AM CST        Medication List   (As Of: 1/2/2020 10:21:00 AM CST)   Prescription/Discharge Order    traMADol  :   traMADol ; Status:   Processing ; Ordered As Mnemonic:   traMADol 50 mg oral tablet ; Ordering Provider:   Kimo Benitez PA-C; Action Display:   Complete ; Catalog Code:   traMADol ; Order Dt/Tm:   1/2/2020 10:18:08 AM CST          rosuvastatin  :   rosuvastatin ; Status:   Prescribed ; Ordered As  Mnemonic:   rosuvastatin 10 mg oral tablet ; Simple Display Line:   1 tab(s), Oral, qhs, 90 tab(s), 3 Refill(s) ; Ordering Provider:   Kimo Benitez PA-C; Catalog Code:   rosuvastatin ; Order Dt/Tm:   10/10/2019 3:47:42 PM CDT          Miscellaneous Rx Supply  :   Miscellaneous Rx Supply ; Status:   Prescribed ; Ordered As Mnemonic:   CPAP machine and supplies ; Simple Display Line:   See Instructions, USE for SANDEE, 1 EA, 0 Refill(s) ; Ordering Provider:   Kimo Benitez PA-C; Catalog Code:   Miscellaneous Rx Supply ; Order Dt/Tm:   3/10/2018 3:25:45 PM CST            Home Meds    acetaminophen  :   acetaminophen ; Status:   Documented ; Ordered As Mnemonic:   Tylenol ; Simple Display Line:   1,000 mg, Oral, q6 hrs, 0 Refill(s) ; Catalog Code:   acetaminophen ; Order Dt/Tm:   1/2/2020 10:17:17 AM CST          aspirin  :   aspirin ; Status:   Documented ; Ordered As Mnemonic:   aspirin 325 mg oral tablet ; Simple Display Line:   325 mg, 1 tab(s), Oral, q4 hrs, 0 Refill(s) ; Catalog Code:   aspirin ; Order Dt/Tm:   1/2/2020 10:16:53 AM CST          oxyCODONE  :   oxyCODONE ; Status:   Documented ; Ordered As Mnemonic:   oxyCODONE ; Simple Display Line:   1 tab, Oral, q4 hrs, 0 Refill(s) ; Catalog Code:   oxyCODONE ; Order Dt/Tm:   1/2/2020 10:17:09 AM CST          ibuprofen  :   ibuprofen ; Status:   Documented ; Ordered As Mnemonic:   IBU ; Simple Display Line:   Oral, 0 Refill(s) ; Catalog Code:   ibuprofen ; Order Dt/Tm:   9/25/2019 10:01:45 AM CDT          celecoxib  :   celecoxib ; Status:   Documented ; Ordered As Mnemonic:   CeleBREX ; Simple Display Line:   Oral, 0 Refill(s) ; Catalog Code:   celecoxib ; Order Dt/Tm:   8/30/2018 10:00:07 AM CDT

## 2022-02-16 NOTE — PROGRESS NOTES
Patient:   MANDO NEGRO            MRN: [id number]            FIN: [id number]               Age:   57 years     Sex:  Male     :  1960   Associated Diagnoses:   Obese; Obstructive sleep apnea; Pre-op exam; Osteoarthritis of left ankle; Urinary hesitancy; Prostatitis   Author:   Kimo Benitez PA-C      Preoperative Information   Indication for surgery:  Ankle pain, Joint degeneration, Trauma.         Associated symptoms: Gait disturbance, Joint stiffness, Joint swelling.    Accompanied by:  No one.    Source of history:  Self, Medical record.    History limitation:  None.       Chief Complaint   2017 8:48 AM CDT    Pre-op: Left ankle surgery, @ [facility] on 17 with Dr. Rasmussen     History of progressive ankle pain. Failed conservative measures.       Review of Systems   Constitutional:  Negative.    Eye:  Negative.    Ear/Nose/Mouth/Throat:  Negative.    Respiratory:  Negative.    Cardiovascular:  Negative.    Gastrointestinal:  Negative.    Genitourinary:  Negative, hesitancy.    Hematology/Lymphatics:  Negative.    Endocrine:  Negative.    Immunologic:  Negative.    Musculoskeletal:  Joint pain, Decreased range of motion.    Integumentary:  Negative.    Neurologic:  Negative.    Psychiatric:  Negative.    All other systems reviewed and negative      Health Status   Allergies:    Allergic Reactions (All)  No Known Medication Allergies  Canceled/Inactive Reactions (All)  No known allergies   Medications:  (Selected)   Prescriptions  Prescribed  Flexeril 10 mg oral tablet: 1 tab(s) ( 10 mg ), PO, TID, PRN: for spasm, # 90 tab(s), 3 Refill(s), Type: Maintenance, Pharmacy: [facility] #[id number], 1 tab(s) po tid,PRN:for spasm  Septra  mg-160 mg oral tablet: 1 tab(s), PO, BID, x 14 day(s), # 28 tab(s), 0 Refill(s), Type: Acute, Pharmacy: [facility] #[id number], 1 tab(s) po bid,x14 day(s)  piroxicam 20 mg oral capsule: 1 cap(s) ( 20 mg ), PO, Daily, # 30 cap(s), 3 Refill(s), Type: Maintenance, Pharmacy:  [facility] #[id number], 1 cap(s) po daily  Documented Medications  Documented  acetaminophen: prn, 0 Refill(s), Type: Maintenance   Problem list:    All Problems  Obese / ICD-9-.00 / Probable  Obstructive sleep apnea / SNOMED CT 214806218 / Confirmed  Tobacco Use Disorder, Continuous / ICD-9-.1 / Confirmed  Inactive: CTS - Carpal tunnel syndrome / SNOMED CT 922368677      Histories   Past Medical History:    Active  Obstructive sleep apnea (320625304): Onset on 3/1/2011 at 50 years.  Comments:  3/17/2011 CDT 9:38 AM CDT - Tarun Nielson MD  AHI 13.2, RDI 20.9, CPAP 8  Tobacco Use Disorder, Continuous (305.1)  Comments:  2011 CST 10:14 AM CST - Jaylyn Mendieta  Oral tobacco  Obese (278.00)   Family History:    Cancer  Father ()  Comments:  2011 10:18 AM - Jaylyn Mednieta  [place] of throat  Breast cancer  Mother ()     Procedure history:    Left ankle surgery on 2016 at 56 Years.  Comments:  2016 9:34 AM - Noreen Gannon  Open debridement with loose body removal.   Subtalar corrective fusion with platelet derived growth factor augment graft.   !st metatarsal dorsiflexioin osteotomy.  Lateral ligament reconstruction.  Peroneus longus debridement and tenodesis of brevis.  Colonoscopy ([id number]) on 2011 at 50 Years.  Comments:  2011 7:41 PM - Stacy Peña MA  Normal colonoscopy, to come back in 10 years, and use propofol for next colonoscopy.  Reconstruction of facial bones ([id number]) in  at 38 Years.  Comments:  2011 10:16 AM - Jaylyn Sanchez  Status post trauma  Tonsillectomy and adenoidectomy ([id number]) in 1963 at 3 Years.   Social History:        Alcohol Assessment            4 x's per week, 2 drinks/episode average.      Tobacco Assessment            Current, Snuff                     Comments:                      2010 - Halina Eric LPN                     one tin per week      Exercise and Physical Activity Assessment: Regular exercise       Other Assessment            Marital Status,      No FH or PMH of bleeding or anesthesia problems. Does have SANDEE. Able to climb two flights of stairs.      Physical Examination   Vital Signs   8/14/2017 8:48 AM CDT Temperature Tympanic 97.5 DegF  LOW    Peripheral Pulse Rate 77 bpm    HR Method Electronic    Systolic Blood Pressure 139 mmHg    Diastolic Blood Pressure 89 mmHg    Mean Arterial Pressure 106 mmHg    BP Method Electronic      Measurements from flowsheet : Measurements   8/14/2017 8:48 AM CDT Height Measured - Standard 74 in    Weight Measured - Standard 253 lb    BSA 2.45 m2    Body Mass Index 32.48 kg/m2      General:  Alert and oriented, No acute distress.    Eye:  Pupils are equal, round and reactive to light, Extraocular movements are intact, Normal conjunctiva.    HENT:  Normocephalic, Tympanic membranes are clear, Oral mucosa is moist, No pharyngeal erythema.    Neck:  Supple, Non-tender, No lymphadenopathy.    Respiratory:  Lungs are clear to auscultation, Respirations are non-labored, Breath sounds are equal.    Cardiovascular:  Normal rate, Regular rhythm, No murmur.    Gastrointestinal:  Soft, Non-tender, Non-distended, Normal bowel sounds, No organomegaly.    Genitourinary:  No costovertebral angle tenderness.         Prostate: Bilateral, Palpable, Smooth, Soft.    Musculoskeletal:  Swelling and decreased ROM of the left ankle..    Integumentary:  No rash.    Neurologic:  No focal deficits.       Review / Management   ECG interpretation:  Normal sinus rhythm, No ST-T changes.       Impression and Plan   Diagnosis     Obese (XJR37-KG E66.9).     Obstructive sleep apnea (QUP82-FR G47.33).     Pre-op exam (YRA96-VQ Z01.818).     Osteoarthritis of left ankle (CCP28-IT M19.072).     Urinary hesitancy (TUV14-UC R39.11).     Prostatitis (ORJ97-ZM N41.9).     Orders     Orders (Selected)   Prescriptions  Prescribed  Septra  mg-160 mg oral tablet: 1 tab(s), PO, BID, x 14 day(s), # 28  tab(s), 0 Refill(s), Type: Acute, Pharmacy: [facility] #[id number], 1 tab(s) po bid,x14 day(s).     No known contraindications to the planned surgical procedure. Labs pending.

## 2022-02-16 NOTE — NURSING NOTE
Comprehensive Intake Entered On:  5/21/2019 1:35 PM CDT    Performed On:  5/21/2019 1:30 PM CDT by Amanda Bennett CMA               Summary   Chief Complaint :   cough; congestion; runny nose; check right ear   Advance Directive :   Yes   Menstrual Status :   N/A   Weight Measured :   244.8 lb(Converted to: 244 lb 13 oz, 111.04 kg)    Height Measured :   73.5 in(Converted to: 6 ft 1 in, 186.69 cm)    Body Mass Index :   31.86 kg/m2 (HI)    Body Surface Area :   2.4 m2   Systolic Blood Pressure :   120 mmHg   Diastolic Blood Pressure :   78 mmHg   Mean Arterial Pressure :   92 mmHg   Peripheral Pulse Rate :   82 bpm   BP Method :   Manual   HR Method :   Manual   Temperature Tympanic :   98.1 DegF(Converted to: 36.7 DegC)    Oxygen Saturation :   95 %   Amanda Bennett CMA - 5/21/2019 1:30 PM CDT   Health Status   Allergies Verified? :   Yes   Medication History Verified? :   Yes   Immunizations Current :   Yes   Medical History Verified? :   Yes   Pre-Visit Planning Status :   Completed   Tobacco Use? :   Never smoker   Amanda Bennett CMA - 5/21/2019 1:30 PM CDT   Consents   Consent for Immunization Exchange :   Consent Granted   Consent for Immunizations to Providers :   Consent Granted   Amanda Bennett CMA - 5/21/2019 1:30 PM CDT   Meds / Allergies   (As Of: 5/21/2019 1:35:44 PM CDT)   Allergies (Active)   No Known Medication Allergies  Estimated Onset Date:   Unspecified ; Created By:   Daly Amaro CMA; Reaction Status:   Active ; Category:   Drug ; Substance:   No Known Medication Allergies ; Type:   Allergy ; Updated By:   Daly Amaro CMA; Reviewed Date:   5/21/2019 1:33 PM CDT        Medication List   (As Of: 5/21/2019 1:35:44 PM CDT)   Prescription/Discharge Order    Miscellaneous Rx Supply  :   Miscellaneous Rx Supply ; Status:   Prescribed ; Ordered As Mnemonic:   CPAP machine and supplies ; Simple Display Line:   See Instructions, USE for SANDEE, 1 EA, 0 Refill(s) ; Ordering Provider:   Emmanuel WHITE  Kimo; Catalog Code:   Miscellaneous Rx Supply ; Order Dt/Tm:   3/10/2018 3:25:45 PM          rosuvastatin  :   rosuvastatin ; Status:   Prescribed ; Ordered As Mnemonic:   rosuvastatin 10 mg oral tablet ; Simple Display Line:   1 tab(s), Oral, qhs, 90 tab(s), 2 Refill(s) ; Ordering Provider:   Kimo Benitez PA-C; Catalog Code:   rosuvastatin ; Order Dt/Tm:   1/2/2019 8:58:33 AM            Home Meds    acetaminophen  :   acetaminophen ; Status:   Documented ; Ordered As Mnemonic:   acetaminophen ; Simple Display Line:   prn, 0 Refill(s) ; Catalog Code:   acetaminophen ; Order Dt/Tm:   11/3/2016 9:38:22 AM          celecoxib  :   celecoxib ; Status:   Documented ; Ordered As Mnemonic:   CeleBREX ; Simple Display Line:   Oral, 0 Refill(s) ; Catalog Code:   celecoxib ; Order Dt/Tm:   8/30/2018 10:00:07 AM

## 2022-02-16 NOTE — LETTER
(Inserted Image. Unable to display)   August 30, 2019      MANDO NEGRO   [address] [place], [place] [id number]        Dear MANDO,      Thank you for selecting [facility] ([facility]) for your healthcare needs.     Our records indicate you are due for the following services:     Annual Physical    To schedule an appointment or if you have further questions, please contact your primary clinic:   [facility]          [phone]   Critical access hospital    [phone]             [facility]         [phone]      Powered by [employer]    Sincerely,    Kimo Benitez PA-C

## 2022-02-16 NOTE — PROGRESS NOTES
Chief Complaint    Colonoscopy Consult  History of Present Illness       Patient here to discuss colon cancer screening.  He had a negative colonoscopy 10 years ago.  No personal or family history of colonic polyps or cancer.  We discussed the options and he would prefer Cologuard to colonoscopy.  We discussed that if the Cologuard is positive a diagnostic colonoscopy would be necessary and its implications.       Patient uses his CPAP every night does not snore has no daytime sleepiness.  Has never been diagnosed with hypertension.  Treated dyslipidemia.  Review of Systems       No abdominal pain, diarrhea, constipation, rectal bleeding, nausea, vomiting, chest pain, dyspnea.  Physical Exam   Vitals & Measurements    T: 97.5  F (Tympanic)  HR: 80 (Peripheral)  BP: 146/86  SpO2: 95%     HT: 73.5 in  WT: 255 lb  BMI: 33.18        Patient is a healthy-appearing young man in no distress.  Alert and oriented.  He has a mask on.  Eyes normal.  Neck is not particularly thick.  Assessment/Plan       Dyslipidemia (E78.5)          On statin.         Ordered:          50257 office o/p est mod 30-39 min (Charge), Quantity: 1, Screening for colon cancer  Dyslipidemia  Obesity, unspecified  Obstructive sleep apnea                Obesity, unspecified (Probable) (E66.9)          Weight loss encouraged.         Ordered:          87395 office o/p est mod 30-39 min (Charge), Quantity: 1, Screening for colon cancer  Dyslipidemia  Obesity, unspecified  Obstructive sleep apnea                Obstructive sleep apnea (G47.33)          His problems began after trauma to the head and neck.  He is subjectively compliant with CPAP and benefiting.  We will get a compliance report.         Ordered:          47411 office o/p est mod 30-39 min (Charge), Quantity: 1, Screening for colon cancer  Dyslipidemia  Obesity, unspecified  Obstructive sleep apnea                Screening for colon cancer (Z12.11)          Discussed the options and  pros and cons of Cologuard versus colonoscopy.  He prefers Cologuard.         Ordered:          34143 office o/p est mod 30-39 min (Charge), Quantity: 1, Screening for colon cancer  Dyslipidemia  Obesity, unspecified  Obstructive sleep apnea                Orders:         Return to Clinic (Request), RFV: BP check with CSS  Patient Information     Name:MANDO NEGRO      Address:      [address] [id number]     Sex:Male     YOB: 1960     [phone]     Emergency Contact:BAKARI NEGRO     MRN:[id number]     FIN:[id number]     Location:[facility]     Date of Service:02/08/2021      Primary Care Physician:       Kimo Benitez PA-C, [phone]      Attending Physician:       Tarun Nielson MD, [phone]  Problem List/Past Medical History    Ongoing     Chronic pain of left ankle     CTS - Carpal tunnel syndrome       Comments: Left wrist     Dyslipidemia     Obesity, unspecified     Obstructive sleep apnea       Comments: AHI 13.2, RDI 20.9, CPAP 8    Historical     Tobacco use       Comments: Oral tobacco  Procedure/Surgical History     Arthroplasty of knee (12/30/2019)      Comments: Unicompartmental right knee..     Arthrodesis of ankle (09/06/2017)      Comments: Left ankle and bone grafting.  Hardware removal x 1 screw..     Left ankle surgery (07/01/2016)      Comments: Open debridement with loose body removal.      Subtalar corrective fusion with platelet derived growth factor augment graft.      !st metatarsal dorsiflexioin osteotomy.      Lateral ligament reconstruction.      Peroneus longus debridement and tenodesis of brevis..     Colonoscopy (01/20/2011)      Comments: Normal colonoscopy, to come back in 10 years, and use propofol for next colonoscopy..     Reconstruction of facial bones (1998)      Comments: Status post trauma.     Tonsillectomy and adenoidectomy (1963)  Medications    CPAP machine and supplies, See Instructions     ibuprofen, 200 mg    rosuvastatin 10 mg oral tablet, 1 tab(s), Oral, qhs, 3 refills  Allergies    No Known Medication Allergies  Social History    Smoking Status     Never smoker     Alcohol      Current, Beer (12 oz), Daily, 2 drinks/episode average. Ready to change: No.     Electronic Cigarette/Vaping      Electronic Cigarette Use: Never.     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     Exercise      Exercise frequency: Daily. Exercise type: Farming.     Home/Environment      Marital status: . Spouse/Partner name: Sandrita. 3 children. Living situation: Home/Independent. Injuries/Abuse/Neglect in household: No. Feels unsafe at home: No. Family/Friends available for support: Yes.     Nutrition/Health      Type of diet: Regular. Sleeping concerns: No. Feels highly stressed: No.     Other      Hearing impairment.     Sexual      Sexually active: Yes. Identifies as male, Sexual orientation: Straight or heterosexual. History of STD: No. Contraceptive Use Details: None. History of sexual abuse: No.     Substance Abuse      Never     Tobacco      Quit 2016, Snuff, 20 year(s).  Family History    Breast cancer: Mother.    Cancer: Father.  Immunizations      Vaccine Date Status          tetanus/diphth/pertuss (Tdap) adult/adol 10/12/2020 Given              Comments : lower          influenza virus vaccine, inactivated 10/12/2020 Given              Comments : upper          influenza virus vaccine, inactivated 09/25/2019 Given          influenza virus vaccine, inactivated 10/22/2018 Recorded          influenza virus vaccine, inactivated 11/02/2017 Recorded          influenza virus vaccine, inactivated 10/11/2016 Given              Comments : [10/11/2016] Pt consented          influenza virus vaccine, inactivated 11/10/2015 Given              Comments : [11/10/2015] Pt consented.          ZOS, shingles 11/10/2015 Given              Comments :  [11/10/2015] Pt consented.          influenza virus vaccine, inactivated 10/09/2014 Recorded          influenza virus vaccine, inactivated 11/01/2013 Recorded          influenza virus vaccine, inactivated 10/11/2012 Recorded          influenza virus vaccine, inactivated 10/21/2011 Recorded          tetanus/diphth/pertuss (Tdap) adult/adol 05/24/2010 Recorded          Td 05/24/2010 Recorded          influenza virus vaccine, inactivated 11/13/2008 Recorded          influenza virus vaccine, inactivated 11/19/2007 Recorded          influenza virus vaccine, inactivated 11/17/2005 Recorded          influenza virus vaccine, inactivated 12/29/2004 Recorded          Td 09/09/1996 Recorded          MMR (measles/mumps/rubella) 02/06/1996 Recorded          Td 02/10/1987 Recorded

## 2022-02-16 NOTE — NURSING NOTE
CAGE Assessment Entered On:  10/12/2020 2:35 PM CDT    Performed On:  10/12/2020 2:35 PM CDT by Radha Moreno CMA               Assessment   Have you ever felt you should cut down on your drinking :   No   Have people annoyed you by criticizing your drinking :   No   Have you ever felt bad or guilty about your drinking :   No   Have you ever taken a drink first thing in the morning to steady your nerves or get rid of a hangover (Eye-opener) :   No   CAGE Score :   0    Radha Moreno CMA - 10/12/2020 2:35 PM CDT

## 2022-02-16 NOTE — PROGRESS NOTES
Patient:   MNADO NEGRO            MRN: [id number]            FIN: [id number]               Age:   61 years     Sex:  Male     :  1960   Associated Diagnoses:   Right knee meniscal tear; Pre-op exam   Author:   Zak Wolf MD      Preoperative Information   Accompanied by:  Family member.    Source of history:  Self, Medical record.    History limitation:  None.       Chief Complaint   2021 11:53 AM CST  Pre-Op Physical Exam, RIght knee repair of failed replacement, [facility], tomorrow 21.   possible repair but likely replacement right knee   History of progressive right knee pain. OA and meniscal injury. Partial knee replacement scheduled      Review of Systems   Constitutional:  Negative.    Eye:  Negative.    Ear/Nose/Mouth/Throat:  Negative.    Respiratory:  Negative.    Cardiovascular:  Negative.    Gastrointestinal:  Negative.    Genitourinary:  Negative, hesitancy.    Hematology/Lymphatics:  Negative.    Endocrine:  Negative.    Immunologic:  Negative.    Musculoskeletal:  Joint pain, Decreased range of motion.    Integumentary:  Negative.    Neurologic:  Negative.    Psychiatric:  Negative.    All other systems reviewed and negative      Health Status   Allergies:    Allergic Reactions (All)  No Known Medication Allergies  Canceled/Inactive Reactions (All)  No known allergies   Medications:  (Selected)   Prescriptions  Prescribed  CPAP machine and supplies: CPAP machine and supplies, See Instructions, Instructions: USE for SANDEE, Supply, # 1 EA, 0 Refill(s), Type: Maintenance  rosuvastatin 10 mg oral tablet: = 1 tab(s), Oral, qhs, # 90 tab(s), 3 Refill(s), Type: Maintenance, Pharmacy: [facility], 1 tab(s) Oral qhs, 73.5, in, 10/14/21 9:51:00 CDT, Height Measured, 257.1, lb, 10/14/21 9:51:00 CDT, Weight Measured  Documented Medications  Documented  ibuprofen: ( 200 mg ), 0 Refill(s), Type: Maintenance   Problem list:    All Problems  Obesity, unspecified / 9696558180 /  Probable  Chronic pain of left ankle / 3690379825 / Confirmed  Dyslipidemia / 3619398609 / Confirmed  Obstructive sleep apnea / 001320278 / Confirmed  AHI 13.2, RDI 20.9, CPAP 8  History of total knee arthroplasty / 0741958237 / Confirmed  Inactive: CTS - Carpal tunnel syndrome / 258683604  Left wrist  Resolved: Tobacco use / 567521251  Oral tobacco      Histories   Past Medical History:    Active  Obstructive sleep apnea (094830569): Onset on 3/1/2011 at 50 years.  Comments:  3/17/2011 CDT 9:38 AM CDT - Tarun Nielson MD  AHI 13.2, RDI 20.9, CPAP 8  Obesity, unspecified (1431069675)  Chronic pain of left ankle (3020135419)  Resolved  Tobacco use (313636420):  Resolved.  Comments:  2011 CST 10:14 AM CST - Jaylyn Mendieta  Oral tobacco   Family History:    Cancer  Father ()  Comments:  2011 10:18 AM CST - Jaylyn Mendieta  [place] of throat  Breast cancer  Mother ()     Procedure history:    Screening for malignant neoplasm of colon (SNOMED CT 7328741084) on 2021 at 60 Years.  Comments:  2021 8:21 AM CST - Karen Saucedo - Negative  Arthroplasty of knee (SNOMED CT 63022183) performed by Gage Lyles MD on 2019 at 59 Years.  Comments:  2020 1:34 PM CST - Noreen Gannon  Unicompartmental right knee.  Arthrodesis of ankle (SNOMED CT 548332436) performed by Gil Rasmussen DPM on 2017 at 57 Years.  Comments:  2017 2:44 PM Noreen Rocha  Left ankle and bone grafting.  Hardware removal x 1 screw.  Left ankle surgery performed by Rochelle Verduzco MD on 2016 at 56 Years.  Comments:  2016 9:34 AM Noreen Rocha  Open debridement with loose body removal.   Subtalar corrective fusion with platelet derived growth factor augment graft.   !st metatarsal dorsiflexioin osteotomy.  Lateral ligament reconstruction.  Peroneus longus debridement and tenodesis of brevis.  Colonoscopy (SNOMED CT 866793661) performed by Jass Eduardo MD on 2011  at 50 Years.  Comments:  1/24/2011 7:41 PM CST - Mariana DIAZStacy  Normal colonoscopy, to come back in 10 years, and use propofol for next colonoscopy.  Reconstruction of facial bones (SNOMED CT 281444070) in 1998 at 38 Years.  Comments:  1/7/2011 10:16 AM CST - RodrigorohanJaylyn lorenzana  Status post trauma  Tonsillectomy and adenoidectomy (SNOMED CT 647761883) in 1963 at 3 Years.   Social History:        Electronic Cigarette/Vaping Assessment            Electronic Cigarette Use: Never.      Alcohol Assessment            Current, Beer (12 oz), Daily, 2 drinks/episode average.  Ready to change: No.      Tobacco Assessment            Quit 2016, Snuff, 20 year(s).      Substance Abuse Assessment            Never      Employment and Education Assessment            Employed, Work/School description: Self Employed Farmer.  Highest education level: High school.      Home and Environment Assessment            Marital status: .  Spouse/Partner name: Sandrita.  Job children.  Living situation: Home/Independent.               Injuries/Abuse/Neglect in household: No.  Feels unsafe at home: No.  Family/Friends available for support:               Yes.      Nutrition and Health Assessment            Type of diet: Regular.  Wants to lose weight: Yes.  Sleeping concerns: No.  Feels highly stressed: No.      Exercise and Physical Activity Assessment            Exercise frequency: Daily.  Exercise type: Farming.      Sexual Assessment            Sexually active: Yes.  Identifies as male, Sexual orientation: Straight or heterosexual.  History of STD: No.               Contraceptive Use Details: None.  History of sexual abuse: No.      Other Assessment            Hearing impairment.     No FH or PMH of bleeding or anesthesia problems. Does have SANDEE. Able to climb two flights of stairs.      Physical Examination   Vital Signs   11/30/2021 11:53 AM CST Peripheral Pulse Rate 70 bpm    Pulse Site Radial artery    HR Method Manual    Systolic Blood  Pressure 132 mmHg  HI    Diastolic Blood Pressure 78 mmHg    Mean Arterial Pressure 96 mmHg    BP Site Right arm    BP Method Manual      Measurements from flowsheet : Measurements   11/30/2021 11:53 AM CST Height Measured 73.5 in    Weight Measured 255 lb    BSA 2.45 m2    Body Mass Index 33.18 kg/m2  HI      General:  Alert and oriented, No acute distress.    Eye:  Pupils are equal, round and reactive to light, Extraocular movements are intact, Normal conjunctiva.    HENT:  Normocephalic, Tympanic membranes are clear, Oral mucosa is moist, No pharyngeal erythema.    Neck:  Supple, Non-tender, No lymphadenopathy, hearing aids in place.    Respiratory:  Lungs are clear to auscultation, Respirations are non-labored, Breath sounds are equal.    Cardiovascular:  Normal rate, Regular rhythm, No murmur.    Gastrointestinal:  Soft, Non-tender, Non-distended, Normal bowel sounds, No organomegaly.    Integumentary:  Warm, No rash.    Neurologic:  No focal deficits.    Psychiatric:  Cooperative, Appropriate mood & affect.       Review / Management   Results review:  Lab results   10/14/2021 10:26 AM CDT Sodium Level 142 mmol/L    Potassium Level 4.2 mmol/L    Chloride Level 106 mmol/L    CO2 Level 26 mmol/L    Glucose Level 98 mg/dL    BUN 20 mg/dL    Creatinine Level 0.94 mg/dL    BUN/Creat Ratio NOT APPLICABLE    eGFR 87 mL/min/1.73m2    eGFR African American 100 mL/min/1.73m2    Calcium Level 9.4 mg/dL    Cholesterol 183 mg/dL    Non-HDL Cholesterol 112    HDL 71 mg/dL    Cholesterol/HDL Ratio 2.6    LDL 93    Triglyceride 96 mg/dL    PSA 2.73 ng/mL   .    cbc 11-  hgb= 15.7,  pqnkajjn=338      Impression and Plan   Diagnosis     Right knee meniscal tear (KWU17-YR S83.206A).     Pre-op exam (CGU78-LX Z01.818).     Pre-op exam (HXC90-IO Z01.818).     Right knee meniscal tear (BUD79-XS S83.206A).     Condition:  has hearing aids  physically active farmer  has had left ankle surgery 1 year ago no residual problems but  fused  wife at home to help in recovery  had covid test at [place] today    uses cpap with good results.    No known contraindications to the planned surgical procedure. ASA Class II

## 2022-02-16 NOTE — PROGRESS NOTES
Patient:   MANDO NEGRO            MRN: [id number]            FIN: [id number]               Age:   59 years     Sex:  Male     :  1960   Associated Diagnoses:   Recurrent sinus infections   Author:   Kimo Benitez PA-C      Report Summary   Diagnosis  Recurrent sinus infections (UML61-YP J32.9).  CoursePatient InstructionsOrders   Visit Information      Date of Service: 2020 09:36 am  Performing Location: [facility] [place]  Encounter#: [id number]      Primary Care Provider (PCP):  Kimo Benitez PA-C    NPI# [id number]      Referring Provider:  Kimo Benitez PA-C# [id number]   Visit type:  Increase in symptoms.    Accompanied by:  No one.    Source of history:  Self.    Referral source:  Self.    History limitation:  None.       Chief Complaint   2020 9:48 AM CST    Possible sinus infection; cough; nasal drainage; headaches;  x3 weeks        History of Present Illness             The patient presents with sinus problem.  The sinus problem is located in the maxillary sinus.  The sinus problem is characterized by nasal congestion, rhinorrhea and facial pain.  The severity of the sinus problem is moderate.  The sinus problem fluctuates in intensity and is worsening.  The sinus problem has lasted for 4.5 week(s).  The context of the sinus problem: occurred in association with illness.  Recurrent sinusitis since facial trauma and reconstruction a number of years ago.  CC above noted and confirmed with the patient.sinus pressure. Purulent nasal discharge and post nasal drip..  Some improvement with Augmentin. Now worse again..        Review of Systems   Constitutional:  Negative except as documented in history of present illness.    Eye:  Negative.    Ear/Nose/Mouth/Throat:  Negative except as documented in history of present illness.    Respiratory:  Cough, Snoring.    Cardiovascular:  Negative.    Gastrointestinal:  Negative.       Health Status   Allergies:    Allergic Reactions  (Selected)  No Known Medication Allergies   Medications:  (Selected)   Prescriptions  Prescribed  CPAP machine and supplies: CPAP machine and supplies, See Instructions, Instructions: USE for SANDEE, Supply, # 1 EA, 0 Refill(s), Type: Maintenance  cefdinir 300 mg oral capsule: = 1 cap(s) ( 300 mg ), PO, q12hr, x 14 day(s), # 28 cap(s), 0 Refill(s), Type: Acute, Pharmacy: [facility], 1 cap(s) Oral q12 hrs,x14 day(s)  predniSONE 10 mg oral tablet: 4 tabs daily x 4, then 3 tabs daily x 4, then 2 tabs daily x 4, then 1 tab daily x 4., Oral, daily, # 40 tab(s), 0 Refill(s), Type: Acute, Pharmacy: [facility], 4 tabs daily x 4, then 3 tabs daily x 4, then 2 tabs daily x 4, then 1 tab daily x 4. Or...  rosuvastatin 10 mg oral tablet: = 1 tab(s), Oral, qhs, # 90 tab(s), 3 Refill(s), Type: Maintenance, Pharmacy: [facility]  Documented Medications  Documented  CeleBREX: Oral, 0 Refill(s), Type: Maintenance  IBU: Oral, 0 Refill(s), Type: Maintenance  aspirin 325 mg oral tablet: = 1 tab(s) ( 325 mg ), Oral, q4 hrs, 0 Refill(s), Type: Maintenance  traMADol: Oral, 0 Refill(s), Type: Maintenance,    Medications          *denotes recorded medication          CPAP machine and supplies: See Instructions, USE for SANDEE, 1 EA, 0 Refill(s).          *aspirin 325 mg oral tablet: 325 mg, 1 tab(s), Oral, q4 hrs, 0 Refill(s).          cefdinir 300 mg oral capsule: 300 mg, 1 cap(s), PO, q12hr, for 14 day(s), 28 cap(s), 0 Refill(s).          *CeleBREX: Oral, 0 Refill(s).          *IBU: Oral, 0 Refill(s).          predniSONE 10 mg oral tablet: 4 tabs daily x 4, then 3 tabs daily x 4, then 2 tabs daily x 4, then 1 tab daily x 4., Oral, daily, 40 tab(s), 0 Refill(s).          rosuvastatin 10 mg oral tablet: 1 tab(s), Oral, qhs, 90 tab(s), 3 Refill(s).          *traMADol: Oral, 0 Refill(s).       Problem list:    All Problems (Selected)  Tobacco use / SNOMED CT 973442071 / Confirmed  Obstructive sleep apnea / SNOMED CT 073845787 /  Confirmed  Obesity, unspecified / SNOMED CT 1112421408 / Probable  Chronic pain of left ankle / SNOMED CT 9010336951 / Confirmed      Histories   Past Medical History:    Active  Obstructive sleep apnea (604518626): Onset on 3/1/2011 at 50 years.  Comments:  3/17/2011 CDT 9:38 AM CDT - Tarun Nielson MD  AHI 13.2, RDI 20.9, CPAP 8  Tobacco use (788260580)  Comments:  2011 CST 10:14 AM Jaylyn Valero  Oral tobacco  Obesity, unspecified (1034540694)  Chronic pain of left ankle (4571255594)   Family History:    Cancer  Father ()  Comments:  2011 10:18 AM Jaylyn Valero  CA of throat  Breast cancer  Mother ()     Procedure history:    Arthroplasty of knee ([id number]) on 2019 at 59 Years.  Comments:  2020 1:34 PM Noreen Boogie  Unicompartmental right knee.  Arthrodesis of ankle (946621656) on 2017 at 57 Years.  Comments:  2017 2:44 PM Noreen Rocha  Left ankle and bone grafting.  Hardware removal x 1 screw.  Left ankle surgery on 2016 at 56 Years.  Comments:  2016 9:34 AM Noreen Rocha  Open debridement with loose body removal.   Subtalar corrective fusion with platelet derived growth factor augment graft.   !st metatarsal dorsiflexioin osteotomy.  Lateral ligament reconstruction.  Peroneus longus debridement and tenodesis of brevis.  Colonoscopy (050765044) on 2011 at 50 Years.  Comments:  2011 7:41 PM CST - Stacy Peña MA  Normal colonoscopy, to come back in 10 years, and use propofol for next colonoscopy.  Reconstruction of facial bones (836994618) in  at 38 Years.  Comments:  2011 10:16 AM Jaylyn Martinez  Status post trauma  Tonsillectomy and adenoidectomy (699879715) in 1963 at 3 Years.   Social History:        Alcohol Assessment: Past            ., 2 drinks/episode average.      Tobacco Assessment: Past            Current, Snuff                     Comments:                      2010 - Jeaneth  NAKUL, Halina                     one tin per week      Substance Abuse Assessment: Denies Substance Abuse            Never      Employment and Education Assessment            Employed, Work/School description: .      Home and Environment Assessment            Marital status: .  Spouse/Partner name: Sandrita.      Nutrition and Health Assessment            Type of diet: Regular.      Exercise and Physical Activity Assessment: Regular exercise      Other Assessment            .        Physical Examination   Vital Signs   2/20/2020 9:48 AM CST Peripheral Pulse Rate 80 bpm    HR Method Electronic    Systolic Blood Pressure 126 mmHg    Diastolic Blood Pressure 80 mmHg    Mean Arterial Pressure 95 mmHg    BP Method Manual    Oxygen Saturation 93 %  LOW      Measurements from flowsheet : Measurements   2/20/2020 9:48 AM CST Height Measured - Standard 73.5 in    Weight Measured - Standard 247 lb    BSA 2.41 m2    Body Mass Index 32.14 kg/m2  HI      General:  Alert and oriented, No acute distress.    Eye:  Pupils are equal, round and reactive to light, Extraocular movements are intact, Normal conjunctiva.    HENT:  Normocephalic, Oral mucosa is moist, Sinus tenderness to percussion.         Nose: Both nostrils, Discharge ( Moderate amount, Green ).    Neck:  Supple, Non-tender.    Respiratory:  Lungs are clear to auscultation, Respirations are non-labored, Breath sounds are equal.    Cardiovascular:  Normal rate, Regular rhythm, No murmur.    Psychiatric:  Cooperative, Appropriate mood & affect.       Impression and Plan   Diagnosis     Recurrent sinus infections (OYW97-FU J32.9).     Course:  Worsening.    Patient Instructions:       Counseled: Patient, Regarding diagnosis, Regarding medications, Activity, Verbalized understanding.    Orders     Orders (Selected)   Prescriptions  Prescribed  cefdinir 300 mg oral capsule: = 1 cap(s) ( 300 mg ), PO, q12hr, x 14 day(s), # 28 cap(s), 0 Refill(s), Type: Acute,  Pharmacy: [facility], 1 cap(s) Oral q12 hrs,x14 day(s)  predniSONE 10 mg oral tablet: 4 tabs daily x 4, then 3 tabs daily x 4, then 2 tabs daily x 4, then 1 tab daily x 4., Oral, daily, # 40 tab(s), 0 Refill(s), Type: Acute, Pharmacy: [facility], 4 tabs daily x 4, then 3 tabs daily x 4, then 2 tabs daily x 4, then 1 tab daily x 4. Or....     Take medicine as prescribed, side effects discussed.  Tylenol/ibuprofen for fever and discomfort.  Push fluids.  RTC if not improving in 36-48 hours, prior if concerns as we have discussed.

## 2022-02-16 NOTE — PROGRESS NOTES
Patient:   MANDO NEGRO            MRN: [id number]            FIN: [id number]               Age:   60 years     Sex:  Male     :  1960   Associated Diagnoses:   Obstructive sleep apnea; Chronic pain of left ankle; Screening for diabetes mellitus; Obesity, unspecified; Annual physical exam; Screening for lipid disorders; Encounter for immunization; Screening for prostate cancer; Pre-op exam   Author:   Kimo Benitez PA-C      Visit Information      Date of Service: 10/12/2020 09:34 am  Performing Location: [facility]  Encounter#: [id number]      Primary Care Provider (PCP):  Kimo Benitez PA-C    NPI# [id number]      Referring Provider:  Kimo Benitez PA-C    NPI# [id number]   Visit type:  Annual exam.    Accompanied by:  No one.    Source of history:  Medical record.    Referral source:  Self.    History limitation:  None.       Chief Complaint   10/12/2020 9:53 AM CDT   Physical; Preop: DOS 2020 Left ankle surgery, [facility]     Chronic left ankle pain. OA.       Well Adult History   Well Adult History             The patient presents for well adult exam.  The patient's general health status is described as good.  The patient's diet is described as balanced.  Exercise: routine.  Complaint:.  Additional pertinent history: daily caffeine use, tobacco use none and alcohol use socially.     Check right ankle. Injured when attacked by bull over one year ago. Did not see anyone at time of injury. No other concerns.      Review of Systems   Constitutional:  Negative.    Eye:  Negative.    Ear/Nose/Mouth/Throat:  Negative.    Respiratory:  Negative.    Cardiovascular:  Negative.    Gastrointestinal:  Negative.    Genitourinary:  Negative.    Hematology/Lymphatics:  Negative.    Endocrine:  Negative.    Immunologic:  Negative.    Musculoskeletal:  Negative except as documented in history of present illness.    Integumentary:  Negative.    Neurologic:  Negative.    Psychiatric:  Negative.    All other  systems reviewed and negative      Health Status   Allergies:    Allergic Reactions (Selected)  No Known Medication Allergies   Medications:  (Selected)   Outpatient Medications  Ordered  Adacel (Tdap): 0.5 mL, im, once  Flublok Quadrivalent [id number]: 0.5 mL, IM, once  Prescriptions  Prescribed  CPAP machine and supplies: CPAP machine and supplies, See Instructions, Instructions: USE for SANDEE, Supply, # 1 EA, 0 Refill(s), Type: Maintenance  rosuvastatin 10 mg oral tablet: = 1 tab(s), Oral, qhs, # 90 tab(s), 3 Refill(s), Type: Maintenance, Pharmacy: [facility]  traMADol 50 mg oral tablet: = 1 tab(s) ( 50 mg ), Oral, q6 hrs, Instructions: May fill at monthly intervals, # 120 tab(s), 5 Refill(s), Type: Maintenance, Pharmacy: [facility], 1 tab(s) Oral q6 hrs,Instr:May fill at monthly intervals   Problem list:    All Problems (Selected)  Tobacco use / SNOMED CT 361391424 / Confirmed  Obstructive sleep apnea / SNOMED CT 558866211 / Confirmed  Obesity, unspecified / SNOMED CT 7760191552 / Probable  Chronic pain of left ankle / SNOMED CT 0547979680 / Confirmed      Histories   Past Medical History:    Active  Obstructive sleep apnea (578861258): Onset on 3/1/2011 at 50 years.  Comments:  3/17/2011 CDT 9:38 AM CDT - Tarun Nielson MD  AHI 13.2, RDI 20.9, CPAP 8  Tobacco use (921242565)  Comments:  2011 CST 10:14 AM CST - Jaylyn Mendieta  Oral tobacco  Obesity, unspecified (2245370948)  Chronic pain of left ankle (3133076545)   Family History:    Cancer  Father ()  Comments:  2011 10:18 AM ALONSO - Jaylyn Mendieta  CA of throat  Breast cancer  Mother ()     Procedure history:    Arthroplasty of knee (98563785) on 2019 at 59 Years.  Comments:  2020 1:34 PM CST Noreen Shaikh  Unicompartmental right knee.  Arthrodesis of ankle (624868589) on 2017 at 57 Years.  Comments:  2017 2:44 PM CDT - Noreen Gannon  Left ankle and bone grafting.  Hardware removal x 1 screw.  Left ankle  surgery on 7/1/2016 at 56 Years.  Comments:  8/16/2016 9:34 AM CDT - Noreen Gannon  Open debridement with loose body removal.   Subtalar corrective fusion with platelet derived growth factor augment graft.   !st metatarsal dorsiflexioin osteotomy.  Lateral ligament reconstruction.  Peroneus longus debridement and tenodesis of brevis.  Colonoscopy (863297463) on 1/20/2011 at 50 Years.  Comments:  1/24/2011 7:41 PM CST - Stacy Peña MA  Normal colonoscopy, to come back in 10 years, and use propofol for next colonoscopy.  Reconstruction of facial bones (165579707) in 1998 at 38 Years.  Comments:  1/7/2011 10:16 AM CST - Jaylyn Sanchez  Status post trauma  Tonsillectomy and adenoidectomy (352078928) in 1963 at 3 Years.   Social History:        Alcohol Assessment: Current            Beer (12 oz), Daily, 3 drinks/episode average.  Ready to change: No.      Tobacco Assessment: Past            Current, Snuff            Quit 2015      Substance Abuse Assessment: Denies Substance Abuse            Never      Employment and Education Assessment            Employed, Work/School description: .  Highest education level: High school.      Home and Environment Assessment            Marital status: .  Spouse/Partner name: Sandrita.  Living situation: Home/Independent.               Injuries/Abuse/Neglect in household: No.  Feels unsafe at home: No.  Family/Friends available for support:               Yes.      Nutrition and Health Assessment            Type of diet: Regular.  Wants to lose weight: Yes.  Sleeping concerns: No.  Feels highly stressed: No.      Exercise and Physical Activity Assessment: Regular exercise            Exercise type: Farming.      Sexual Assessment            Sexually active: Yes.  Identifies as male, History of STD: No.  History of sexual abuse: No.      Other Assessment            Hearing impairment.        Physical Examination   Vital Signs   10/12/2020 9:53 AM CDT Peripheral Pulse Rate  70 bpm    HR Method Electronic    Systolic Blood Pressure 142 mmHg  HI    Diastolic Blood Pressure 80 mmHg    Mean Arterial Pressure 101 mmHg    BP Site Right arm    BP Method Manual    Oxygen Saturation 92 %  LOW      Measurements from flowsheet : Measurements   10/12/2020 9:53 AM CDT Height Measured - Standard 73.5 in    Weight Measured - Standard 240 lb    BSA 2.37 m2    Body Mass Index 31.23 kg/m2  HI      General:  No acute distress.    Eye:  Pupils are equal, round and reactive to light, Extraocular movements are intact, Normal conjunctiva.    HENT:  Normocephalic, Tympanic membranes are clear, Oral mucosa is moist, No pharyngeal erythema.    Neck:  Supple, Non-tender, No lymphadenopathy, No thyromegaly.    Respiratory:  Lungs are clear to auscultation, Respirations are non-labored, Breath sounds are equal.    Cardiovascular:  Normal rate, Regular rhythm, No murmur, Good pulses equal in all extremities, Normal peripheral perfusion.         Edema: Left, Leg, Ankle.    Gastrointestinal:  Soft, Non-tender, Non-distended, Normal bowel sounds, No organomegaly.    Genitourinary:  No costovertebral angle tenderness.    Musculoskeletal:  No deformity, Antalgic gait Left ankle swelling.    Integumentary:  No rash.    Neurologic:  No focal deficits.    Psychiatric:  Cooperative, Appropriate mood & affect.       Health Maintenance      Recommendations     Pending (in the next year)        OverDue           Tetanus Vaccine due  05/24/20  and every 10  year(s)           Influenza Vaccine due  08/31/20  and every 1  year(s)        Due            Lipid Disorders Screen (Male) due  09/25/20  and every 1  year(s)           Alcohol Misuse Screen (Male) due  09/25/20  and every 1  year(s)           Depression Screen (Male) due  09/25/20  and every 1  year(s)           Hepatitis C Screen [id number] (Male) due  10/12/20  One-time only           Lung Cancer Screen (Male) due  10/12/20  and every 1  year(s)           Type 2 Diabetes  Mellitus Screen (Male) due  10/12/20  Variable frequency           Zoster/Shingles Vaccine due  10/12/20  One-time only        Postponed            Colorectal Cancer Screen (Occult Blood) (Male) due  01/20/21  and every 10  year(s)        Due In Future            Colorectal Cancer Screen (Colonoscopy) (Male) not due until  01/20/21  and every 10  year(s)           Colorectal Cancer Screen (Sigmoidoscopy) (Male) not due until  01/20/21  and every 10  year(s)     Satisfied (in the past 1 year)        Satisfied            Aspirin Therapy for Prevention of CVD (Male) on  01/02/20.           Body Mass Index Check (Male) on  10/12/20.           Body Mass Index Check (Male) on  02/20/20.           Body Mass Index Check (Male) on  01/20/20.           Body Mass Index Check (Male) on  12/13/19.           High Blood Pressure Screen (Male) on  10/12/20.           High Blood Pressure Screen (Male) on  02/20/20.           High Blood Pressure Screen (Male) on  01/20/20.           High Blood Pressure Screen (Male) on  01/02/20.           High Blood Pressure Screen (Male) on  12/13/19.           Obesity Screen and Counseling (Male) on  10/12/20.           Obesity Screen and Counseling (Male) on  02/20/20.           Obesity Screen and Counseling (Male) on  01/20/20.           Obesity Screen and Counseling (Male) on  12/13/19.          Review / Management   ECG interpretation:  Past EKG reviewed.       Impression and Plan   Diagnosis     Obstructive sleep apnea (QMA12-JG G47.33).     Chronic pain of left ankle (FHK51-HU M25.572).     Pre-op exam (FIB67-EE Z01.818).     Screening for diabetes mellitus (ZNS18-GV Z13.1).     Obesity, unspecified (ELT42-WO E66.9).     Annual physical exam (BZQ83-XX Z00.00).     Screening for lipid disorders (PEN55-TW Z13.220).     Encounter for immunization (NUZ60-YZ Z23).     Screening for prostate cancer (ZQF69-CF Z12.5).     Patient Instructions:       Counseled: Patient, Regarding diagnosis, Regarding  medications, Verbalized understanding.    Orders     Orders (Selected)   Outpatient Orders  Ordered (Dispatched)  Basic Metabolic Panel* ([facility]): Specimen Type: Serum, Collection Date: 10/12/20 9:44:00 CDT  CBC (h/h, RBC, indices, WBC, Plt)* ([facility]): Specimen Type: Blood, Collection Date: 10/12/20 9:44:00 CDT  Lipid panel with reflex to direct ldl* ([facility]): Specimen Type: Serum, Collection Date: 10/12/20 9:44:00 CDT  PSA, Total (Room)* ([facility]): Specimen Type: Serum, Collection Date: 10/12/20 9:44:00 CDT.     No known contraindications to the planned surgical procedure.

## 2022-02-16 NOTE — NURSING NOTE
Hearing and Vision Screening Entered On:  9/25/2019 11:09 AM CDT    Performed On:  9/25/2019 11:08 AM CDT by Radha Moreno CMA               Hearing and Vision Screening   Hearing Screen Comments :   Pt has Hearing Aids   Radha Moreno CMA - 9/25/2019 11:08 AM CDT

## 2022-02-16 NOTE — PROGRESS NOTES
Patient:   MANDO NEGRO            MRN: [id number]            FIN: [id number]               Age:   59 years     Sex:  Male     :  1960   Associated Diagnoses:   Chronic pain of left ankle; Right knee meniscal tear; Osteoarthritis of right knee; Pre-op exam   Author:   Kimo Benitez PA-C      Report Summary   Diagnosis  Chronic pain of left ankle (PIK09-WD M25.572).  Orders   Preoperative Information   Indication for surgery:  Arthritis, Joint degeneration, Trauma.         Associated symptoms: Gait disturbance, Joint stiffness, Joint swelling.    Accompanied by:  No one.    Source of history:  Self, Medical record.    History limitation:  None.       Chief Complaint   2019 9:53 AM CST   Pre-op, [facility], DOS 19, Dr. Lyles, Knee     History of progressive right knee pain. OA and meniscal injury. Partial knee replacement scheduled      Review of Systems   Constitutional:  Negative.    Eye:  Negative.    Ear/Nose/Mouth/Throat:  Negative.    Respiratory:  Negative.    Cardiovascular:  Negative.    Gastrointestinal:  Negative.    Genitourinary:  Negative, hesitancy.    Hematology/Lymphatics:  Negative.    Endocrine:  Negative.    Immunologic:  Negative.    Musculoskeletal:  Joint pain, Decreased range of motion.    Integumentary:  Negative.    Neurologic:  Negative.    Psychiatric:  Negative.    All other systems reviewed and negative      Health Status   Allergies:    Allergic Reactions (All)  No Known Medication Allergies  Canceled/Inactive Reactions (All)  No known allergies   Medications:  (Selected)   Prescriptions  Prescribed  CPAP machine and supplies: CPAP machine and supplies, See Instructions, Instructions: USE for SANDEE, Supply, # 1 EA, 0 Refill(s), Type: Maintenance  rosuvastatin 10 mg oral tablet: = 1 tab(s), Oral, qhs, # 90 tab(s), 3 Refill(s), Type: Maintenance, Pharmacy: [facility]  Documented Medications  Documented  CeleBREX: Oral, 0 Refill(s), Type: Maintenance  IBU: Oral, 0  Refill(s), Type: Maintenance  traMADol 50 mg oral tablet: 0 Refill(s), Type: Soft Stop   Problem list:    All Problems  Tobacco Use Disorder, Continuous / ICD-9-.1 / Confirmed  Obstructive sleep apnea / SNOMED CT 265917219 / Confirmed  Obese / ICD-9-.00 / Probable  Chronic pain of left ankle / SNOMED CT 6597270507 / Confirmed  Inactive: CTS - Carpal tunnel syndrome / SNOMED CT 051683008      Histories   Past Medical History:    Active  Obstructive sleep apnea (966938979): Onset on 3/1/2011 at 50 years.  Comments:  3/17/2011 CDT 9:38 AM CDT - Tarun Nielson MD  AHI 13.2, RDI 20.9, CPAP 8  Tobacco Use Disorder, Continuous (305.1)  Comments:  2011 CST 10:14 AM Jaylyn Valero  Oral tobacco  Obese (278.00)   Family History:    Cancer  Father ()  Comments:  2011 10:18 AM Jaylyn Valero  CA of throat  Breast cancer  Mother ()     Procedure history:    Arthrodesis of ankle (784312872) on 2017 at 57 Years.  Comments:  2017 2:44 PM Noreen Rocha  Left ankle and bone grafting.  Hardware removal x 1 screw.  Left ankle surgery on 2016 at 56 Years.  Comments:  2016 9:34 AM Noreen Rocha  Open debridement with loose body removal.   Subtalar corrective fusion with platelet derived growth factor augment graft.   !st metatarsal dorsiflexioin osteotomy.  Lateral ligament reconstruction.  Peroneus longus debridement and tenodesis of brevis.  Colonoscopy (537017016) on 2011 at 50 Years.  Comments:  2011 7:41 PM CST - Stacy Peña MA  Normal colonoscopy, to come back in 10 years, and use propofol for next colonoscopy.  Reconstruction of facial bones (439590937) in  at 38 Years.  Comments:  2011 10:16 AM Jaylyn Martinez  Status post trauma  Tonsillectomy and adenoidectomy (345850053) in  at 3 Years.   Social History:        Alcohol Assessment: Past            ., 2 drinks/episode average.      Tobacco Assessment: Past             Current, Snuff                     Comments:                      04/07/2010 - Halina Eric LPN                     one tin per week      Substance Abuse Assessment: Denies Substance Abuse            Never      Employment and Education Assessment            Employed, Work/School description: .      Home and Environment Assessment            Marital status: .  Spouse/Partner name: Sandrita.      Nutrition and Health Assessment            Type of diet: Regular.      Exercise and Physical Activity Assessment: Regular exercise      Other Assessment            .     No FH or PMH of bleeding or anesthesia problems. Does have SANDEE. Able to climb two flights of stairs.      Physical Examination   Vital Signs   12/13/2019 9:53 AM CST Temperature Tympanic 97.9 DegF    Peripheral Pulse Rate 76 bpm    HR Method Electronic    Systolic Blood Pressure 146 mmHg  HI    Diastolic Blood Pressure 90 mmHg  HI    Mean Arterial Pressure 109 mmHg    BP Site Right arm    BP Method Electronic      Measurements from flowsheet : Measurements   12/13/2019 9:53 AM CST Height Measured - Standard 73.5 in    Weight Measured - Standard 245 lb    BSA 2.4 m2    Body Mass Index 31.88 kg/m2  HI      General:  Alert and oriented, No acute distress.    Eye:  Pupils are equal, round and reactive to light, Extraocular movements are intact, Normal conjunctiva.    HENT:  Normocephalic, Tympanic membranes are clear, Oral mucosa is moist, No pharyngeal erythema.    Neck:  Supple, Non-tender, No lymphadenopathy.    Respiratory:  Lungs are clear to auscultation, Respirations are non-labored, Breath sounds are equal.    Cardiovascular:  Normal rate, Regular rhythm, No murmur.    Gastrointestinal:  Soft, Non-tender, Non-distended, Normal bowel sounds, No organomegaly.    Genitourinary:  No costovertebral angle tenderness.         Prostate: Bilateral, Palpable, Smooth, Soft.    Musculoskeletal:  Swelling and decreased ROM of the left ankle..     Integumentary:  No rash.    Neurologic:  No focal deficits.       Review / Management   ECG interpretation:  Normal sinus rhythm, No ST-T changes.       Impression and Plan   Diagnosis     Chronic pain of left ankle (UZB33-WA M25.572).     Right knee meniscal tear (HZI87-AF S83.206A).     Osteoarthritis of right knee (ZHV13-WK M17.11).     Pre-op exam (FWB08-HQ Z01.818).     Orders     Orders (Selected)   Outpatient Orders  Ordered (Dispatched)  Culture, Urine, Routine* ([facility]): Specimen Type: Urine (Clean Catch), Collection Date: 12/13/19 10:16:00 CST.     No known contraindications to the planned surgical procedure. ASA Class II

## 2022-02-16 NOTE — PROGRESS NOTES
Patient:   MANDO NEGRO            MRN: [id number]            FIN: [id number]               Age:   59 years     Sex:  Male     :  1960   Associated Diagnoses:   Osteoarthritis of right knee; Status post right knee replacement   Author:   Kimo Benitez PA-C      Report Summary   Diagnosis  Osteoarthritis of right knee (NGZ81-IU M17.11).  Patient Instructions   Visit Information      Date of Service: 2020 10:08 am  Performing Location: [facility]  Encounter#: [id number]      Primary Care Provider (PCP):  Kimo Benitez PA-C    NPI# [id number]      Referring Provider:  Kimo Benitez PA-C# [id number]   Visit type:  General concerns.    Accompanied by:  Spouse.    Source of history:  Self, Spouse, Medical record.    Referral source:  Self.    History limitation:  None.       Chief Complaint   2020 10:13 AM CST    Follow up knee surgery        History of Present Illness             The patient presents with a knee problem.  The location of the knee problem is the right knee.  The knee problem is characterized by aching.  The severity of the knee problem is moderate.  The timing/course of symptom(s) associated with the knee problem is episodic, fluctuates in intensity and is improving.  Recent right unicompartment replacement. Feeling well. Bowels moving. No fevers, no calf pain. On ASA and oxycodone in addition to his usual medications. CC above noted and confirmed with the patient..        Review of Systems   Constitutional:  Negative.    Eye:  Negative.    Respiratory:  Negative.    Cardiovascular:  Negative.    Musculoskeletal:  Negative except as documented in history of present illness.    Integumentary:  Negative except as documented in history of present illness.    Neurologic:  Negative.       Health Status   Allergies:    Allergic Reactions (All)  No Known Medication Allergies  Canceled/Inactive Reactions (All)  No known allergies   Medications:  (Selected)    Prescriptions  Prescribed  CPAP machine and supplies: CPAP machine and supplies, See Instructions, Instructions: USE for SANDEE, Supply, # 1 EA, 0 Refill(s), Type: Maintenance  rosuvastatin 10 mg oral tablet: = 1 tab(s), Oral, qhs, # 90 tab(s), 3 Refill(s), Type: Maintenance, Pharmacy: [facility]  Documented Medications  Documented  CeleBREX: Oral, 0 Refill(s), Type: Maintenance  IBU: Oral, 0 Refill(s), Type: Maintenance  Tylenol: ( 1,000 mg ), Oral, q6 hrs, 0 Refill(s), Type: Maintenance  aspirin 325 mg oral tablet: = 1 tab(s) ( 325 mg ), Oral, q4 hrs, 0 Refill(s), Type: Maintenance  oxyCODONE: 1 tab, Oral, q4 hrs, 0 Refill(s), Type: Maintenance   Problem list:    All Problems (Selected)  Tobacco use / SNOMED CT 126142057 / Confirmed  Obstructive sleep apnea / SNOMED CT 057141918 / Confirmed  Obesity, unspecified / SNOMED CT 0988355294 / Probable  Chronic pain of left ankle / SNOMED CT 6102641549 / Confirmed      Histories   Past Medical History:    Active  Obstructive sleep apnea (517671422): Onset on 3/1/2011 at 50 years.  Comments:  3/17/2011 CDT 9:38 AM JAYMIET - Tarun Nielson MD  AHI 13.2, RDI 20.9, CPAP 8  Tobacco use (645470595)  Comments:  2011 CST 10:14 AM CST Jaylyn Mancini  Oral tobacco  Obesity, unspecified (2627806295)  Chronic pain of left ankle (1196760658)   Family History:    Cancer  Father ()  Comments:  2011 10:18 AM Jaylyn Valero  CA of throat  Breast cancer  Mother ()     Procedure history:    Arthrodesis of ankle (061024790) on 2017 at 57 Years.  Comments:  2017 2:44 PM Noreen Rocha  Left ankle and bone grafting.  Hardware removal x 1 screw.  Left ankle surgery on 2016 at 56 Years.  Comments:  2016 9:34 AM Noreen Rocha  Open debridement with loose body removal.   Subtalar corrective fusion with platelet derived growth factor augment graft.   !st metatarsal dorsiflexioin osteotomy.  Lateral ligament reconstruction.  Peroneus  longus debridement and tenodesis of brevis.  Colonoscopy (254395374) on 1/20/2011 at 50 Years.  Comments:  1/24/2011 7:41 PM CST - Stacy Peña MA  Normal colonoscopy, to come back in 10 years, and use propofol for next colonoscopy.  Reconstruction of facial bones (501680512) in 1998 at 38 Years.  Comments:  1/7/2011 10:16 AM CST - Jaylyn Sanchez  Status post trauma  Tonsillectomy and adenoidectomy (189314174) in 1963 at 3 Years.   Social History:        Alcohol Assessment: Past            ., 2 drinks/episode average.      Tobacco Assessment: Past            Current, Snuff                     Comments:                      04/07/2010 - Halina Eric LPN                     one tin per week      Substance Abuse Assessment: Denies Substance Abuse            Never      Employment and Education Assessment            Employed, Work/School description: .      Home and Environment Assessment            Marital status: .  Spouse/Partner name: Sandrita.      Nutrition and Health Assessment            Type of diet: Regular.      Exercise and Physical Activity Assessment: Regular exercise      Other Assessment            .        Physical Examination   Vital Signs   1/2/2020 10:13 AM CST Temperature Tympanic 97.9 DegF    Peripheral Pulse Rate 80 bpm    Pulse Site Radial artery    HR Method Manual    Systolic Blood Pressure 136 mmHg  HI    Diastolic Blood Pressure 82 mmHg  HI    Mean Arterial Pressure 100 mmHg    BP Site Right arm    BP Method Manual      Measurements from flowsheet : Measurements   1/2/2020 10:13 AM CST Height Measured - Standard 73.5 in    Ht/Wt Measurement Refused by Patient? Yes      Respiratory:  Lungs are clear to auscultation, Respirations are non-labored.    Cardiovascular:  Normal rate, Regular rhythm.    Integumentary:  Incision clean, dry and intact. Ecchymosis noted. No warmth.       Impression and Plan   Diagnosis     Osteoarthritis of right knee (VAF18-OO M17.11).     Status  post right knee replacement (QEC79-UU Z96.651).     Patient Instructions:       Counseled: Patient, Regarding diagnosis, Activity, Verbalized understanding.    Dressing changed. FU with PT and ortho as scheduled.

## 2022-02-16 NOTE — PROGRESS NOTES
Patient:   MANDO NEGRO            MRN: [id number]            FIN: [id number]               Age:   57 years     Sex:  Male     :  1960   Associated Diagnoses:   Maxillary sinusitis   Author:   Miguelito MOJICA [facility]      Visit Information   Source of history:  Self.    Referral source   History limitation:  None.       Chief Complaint   2017 9:42 AM CST   c/o sinus congestion x Monday        History of Present Illness             The patient presents with facial pain.  The location is around the nose and around the mouth.  The onset was gradual.  There were exacerbating factors including blowing nose.  Radiation around the mouth.  The severity is moderate.  The symptom occurs intermittently.  The course is worsening.  The effect on daily activities is no change in activity level and no change in sleeping patterns.               The patient presents with.               The patient presents with nasal congestion.  There were exacerbating factors including allergen exposure.  It is described as a sensation of pressure and feeling plugged.  The symptom occurs constantly.               The patient presents with rhinorrhea.        Review of Systems   Constitutional:  Negative.    Respiratory:  Negative.    Cardiovascular:  Negative.       Health Status   Allergies:    Allergic Reactions (Selected)  No Known Medication Allergies   Medications:  (Selected)   Documented Medications  Documented  acetaminophen: prn, 0 Refill(s), Type: Maintenance   Problem list:    All Problems  Obese / ICD-9-.00 / Probable  Obstructive sleep apnea / SNOMED CT 910049098 / Confirmed  Tobacco Use Disorder, Continuous / ICD-9-.1 / Confirmed  Inactive: CTS - Carpal tunnel syndrome / SNOMED CT 296621451      Histories   Past Medical History:    Active  Obstructive sleep apnea (SNOMED CT 069692766): Onset on 3/1/2011 at 50 years.  Comments:  3/17/2011 CDT 9:38 AM CDT - Tarun Nielson MD  AHI 13.2, RDI 20.9, CPAP 8  Tobacco  Use Disorder, Continuous (ICD-9-.1)  Comments:  2011 CST 10:14 AM CST - Jaylyn Mendieta  Oral tobacco  Obese (ICD-9-.00)   Family History:    Cancer  Father ()  Comments:  2011 10:18 AM - Jaylyn Mendieta  CA of throat  Breast cancer  Mother ()     Procedure history:    Arthrodesis of ankle (SNOMED CT 917211339) performed by Gil Rasmussen DPM on 2017 at 57 Years.  Comments:  2017 2:44 PM - Noreen Gannon  Left ankle and bone grafting.  Hardware removal x 1 screw.  Left ankle surgery performed by Rochelle Verduzco MD on 2016 at 56 Years.  Comments:  2016 9:34 AM - Noreen Gannon  Open debridement with loose body removal.   Subtalar corrective fusion with platelet derived growth factor augment graft.   !st metatarsal dorsiflexioin osteotomy.  Lateral ligament reconstruction.  Peroneus longus debridement and tenodesis of brevis.  Colonoscopy (SNOMED CT 216210752) performed by Jass Eduardo MD on 2011 at 50 Years.  Comments:  2011 7:41 PM - Stacy Peña MA  Normal colonoscopy, to come back in 10 years, and use propofol for next colonoscopy.  Reconstruction of facial bones (SNOMED CT 147575118) in  at 38 Years.  Comments:  2011 10:16 AM - Jaylyn Sanchez  Status post trauma  Tonsillectomy and adenoidectomy (SNOMED CT 930689695) in 1963 at 3 Years.   Social History:        Alcohol Assessment            4 x's per week, 2 drinks/episode average.      Tobacco Assessment            Current, Snuff                     Comments:                      2010 - Halina Eric LPN                     one tin per week      Employment and Education Assessment            Employed, Work/School description: farmer.      Exercise and Physical Activity Assessment: Regular exercise      Other Assessment            Marital Status,         Physical Examination   Vital Signs   2017 9:42 AM CST Temperature Tympanic 97 DegF  LOW    Peripheral Pulse Rate  74 bpm    Pulse Site Radial artery    HR Method Electronic    Systolic Blood Pressure 134 mmHg  HI    Diastolic Blood Pressure 82 mmHg  HI    Mean Arterial Pressure 99 mmHg    BP Site Left arm    BP Method Manual    Oxygen Saturation 97 %      Measurements from flowsheet : Measurements   12/22/2017 9:42 AM CST Height Measured - Standard 74 in    Weight Measured - Standard 254.6 lb    BSA 2.45 m2    Body Mass Index 32.69 kg/m2      General:  Alert and oriented, No acute distress.    HENT:  Normocephalic, Tympanic membranes are clear.         Sinus: Maxillary sinus, Tenderness.    Neck:  Supple, Non-tender.    Respiratory:  Lungs are clear to auscultation, Respirations are non-labored.    Cardiovascular:  Normal rate, Regular rhythm, No murmur.       Impression and Plan   Diagnosis     Maxillary sinusitis (YIF82-HO J32.0).     Orders     Orders   Pharmacy:  Augmentin 875 mg oral tablet (Prescribe): 1 tab(s), PO, q12hr, x 10 day(s), # 20 tab(s), 0 Refill(s), Type: Acute, Pharmacy: [id number], 1 tab(s) po q12 hrs,x10 day(s).     Push fluids, tylenol for pain/fever, f/u prn.     Orders     F/U in 48-72 hours if not improving, sooner if getting worse.

## 2022-02-16 NOTE — NURSING NOTE
Depression Screening Entered On:  9/25/2019 11:09 AM CDT    Performed On:  9/25/2019 11:08 AM CDT by Radha Moreno CMA               Depression Screening   Little Interest - Pleasure in Activities :   Not at all   Feeling Down, Depressed, Hopeless :   Not at all   Initial Depression Screen Score :   0    Trouble Falling or Staying Asleep :   Not at all   Feeling Tired or Little Energy :   Several days   Poor Appetite or Overeating :   Not at all   Feeling Bad About Yourself :   Not at all   Trouble Concentrating :   Not at all   Moving or Speaking Slowly :   Not at all   Thoughts Better Off Dead or Hurting Self :   Not at all   Detailed Depression Screen Score :   1    Total Depression Screen Score :   1    TABATHA Difficulty with Work, Home, Others :   Not difficult at all   Radha Moreno CMA - 9/25/2019 11:08 AM CDT

## 2022-02-16 NOTE — RESULTS
(Inserted Image. Unable to display)          (Inserted Image. Unable to display)     [address] [address]  Phone [phone]      ELECTROCARDIOGRAM REPORT    MANDO NEGRO : 1960  DATE OF EXAM:  2019 MRN: [id number]  Ordering HCP:  Kimo Benitez PA-C         Indication: Preop.     Findings:  Normal sinus rhythm at 73.  Left atrial enlargement, possible LVH.     Impression: Borderline electrocardiogram.       Damian Farley MD, FAAFP   Interpreting HCP    CHT/sq  D:  2019  T:  2019    ELECTROCARDIOGRAM REPORT

## 2022-02-16 NOTE — PROGRESS NOTES
Patient:   MANDO NEGRO            MRN: [id number]            FIN: [id number]               Age:   58 years     Sex:  Male     :  1960   Associated Diagnoses:   Maxillary sinusitis   Author:   Damian Farley MD      Visit Information      Primary Care Provider (PCP):  Kimo Benitez PA-C    NPI# [id number]   Source of history:  Self.    Referral source   History limitation:  None.       Chief Complaint   2018 11:02 AM CST  Sinus congestion, face is tender     Chief complaint and symptoms noted above confirmed with patient.      History of Present Illness             The patient presents with facial pain.  The location is around the nose and around the mouth.  The onset was gradual.  There were exacerbating factors including blowing nose.  Radiation around the mouth.  The severity is moderate.  The symptom occurs intermittently.  The course is worsening.  The effect on daily activities is no change in activity level and no change in sleeping patterns.               The patient presents with.               The patient presents with nasal congestion.  There were exacerbating factors including allergen exposure.  It is described as a sensation of pressure and feeling plugged.  The symptom occurs constantly.               The patient presents with rhinorrhea.        Review of Systems   Constitutional:  Negative.    Respiratory:  Negative.    Cardiovascular:  Negative.       Health Status   Allergies:    Allergic Reactions (Selected)  No Known Medication Allergies   Medications:  (Selected)   Prescriptions  Prescribed  CPAP machine and supplies: CPAP machine and supplies, See Instructions, Instructions: USE for SANDEE, Supply, # 1 EA, 0 Refill(s), Type: Maintenance  Crestor 10 mg oral tablet: = 1 tab(s) ( 10 mg ), Oral, hs, # 90 tab(s), 0 Refill(s), Type: Maintenance, Pharmacy: [facility] #[id number], 1 tab(s) Oral hs  Documented Medications  Documented  CeleBREX: Oral, 0 Refill(s), Type:  Maintenance  acetaminophen: prn, 0 Refill(s), Type: Maintenance   Problem list:    All Problems  Obese / ICD-9-.00 / Probable  Obstructive sleep apnea / SNOMED CT 029876792 / Confirmed  Tobacco Use Disorder, Continuous / ICD-9-.1 / Confirmed  Inactive: CTS - Carpal tunnel syndrome / SNOMED CT 041285568      Histories   Past Medical History:    Active  Obstructive sleep apnea (SNOMED CT 367102681): Onset on 3/1/2011 at 50 years.  Comments:  3/17/2011 CDT 9:38 AM CDT - Tarun Nielson MD  AHI 13.2, RDI 20.9, CPAP 8  Tobacco Use Disorder, Continuous (ICD-9-.1)  Comments:  2011 CST 10:14 AM Jaylyn Valero  Oral tobacco  Obese (ICD-9-.00)   Family History:    Cancer  Father ()  Comments:  2011 10:18 AM Jaylyn Valero  CA of throat  Breast cancer  Mother ()     Procedure history:    Arthrodesis of ankle (SNOMED CT 571164909) performed by Gil Rasmussen DPM on 2017 at 57 Years.  Comments:  2017 2:44 PM Noreen Rocha  Left ankle and bone grafting.  Hardware removal x 1 screw.  Left ankle surgery performed by Priya Verduzco MDica on 2016 at 56 Years.  Comments:  2016 9:34 AM Noreen Rocha  Open debridement with loose body removal.   Subtalar corrective fusion with platelet derived growth factor augment graft.   !st metatarsal dorsiflexioin osteotomy.  Lateral ligament reconstruction.  Peroneus longus debridement and tenodesis of brevis.  Colonoscopy (SNOMED CT 855537303) performed by Jass Eduardo MD on 2011 at 50 Years.  Comments:  2011 7:41 PM CST - Stacy Peña MA  Normal colonoscopy, to come back in 10 years, and use propofol for next colonoscopy.  Reconstruction of facial bones (SNOMED CT 887773696) in  at 38 Years.  Comments:  2011 10:16 AM Jaylyn Martinez  Status post trauma  Tonsillectomy and adenoidectomy (SNOMED CT 466477050) in 1963 at 3 Years.   Social History:        Alcohol Assessment: Past             ., 2 drinks/episode average.      Tobacco Assessment: Past            Current, Snuff                     Comments:                      04/07/2010 - Halina Eric LPN                     one tin per week      Substance Abuse Assessment: Denies Substance Abuse            Never      Employment and Education Assessment            Employed, Work/School description: .      Home and Environment Assessment            Marital status: .  Spouse/Partner name: Sandrita.      Nutrition and Health Assessment            Type of diet: Regular.      Exercise and Physical Activity Assessment: Regular exercise      Other Assessment            .      Physical Examination   Vital Signs   12/21/2018 11:02 AM CST Temperature Tympanic 97.1 DegF  LOW    Peripheral Pulse Rate 70 bpm    Pulse Site Radial artery    HR Method Manual    Systolic Blood Pressure 136 mmHg  HI    Diastolic Blood Pressure 80 mmHg    Mean Arterial Pressure 99 mmHg    BP Site Left arm    BP Method Manual      Measurements from flowsheet : Measurements   12/21/2018 11:02 AM CST Height Measured - Standard 73.5 in    Weight Measured - Standard 251.2 lb    BSA 2.43 m2    Body Mass Index 32.69 kg/m2  HI      General:  Alert and oriented, No acute distress.    Eye:  Normal conjunctiva.    HENT:  Normocephalic.         Sinus: Maxillary sinus, Tenderness.    Neck:  Supple, Non-tender.    Respiratory:  Lungs are clear to auscultation.    Cardiovascular:  Normal rate, Regular rhythm.       Impression and Plan   Diagnosis     Maxillary sinusitis (HVE33-BD J32.0).     Course:  Worsening.    Orders     Orders   Pharmacy:  Promethazine with Codeine 6.25 mg-10 mg/5 mL oral syrup (Prescribe): 5 mL, po, q4 hrs, # 120 mL, 0 Refill(s), Type: Maintenance, Pharmacy: [facility] #[id number], 5 mL Oral q4 hrs  Augmentin 875 mg oral tablet (Prescribe): 1 tab(s), PO, q12hr, x 10 day(s), # 20 tab(s), 0 Refill(s), Type: Maintenance, Pharmacy: [facility] #[id number], 1  tab(s) Oral q12 hrs,x10 day(s).     [place] Prescription Drug Monitoring Program checked and reviewed.    .

## 2022-02-16 NOTE — PROGRESS NOTES
Patient:   MANDO NEGRO            MRN: [id number]            FIN: [id number]               Age:   59 years     Sex:  Male     :  1960   Associated Diagnoses:   Recurrent sinus infections; Impacted cerumen of right ear; Chronic pain of left ankle   Author:   Hermila Javier MD      Chief Complaint   2019 1:30 PM CDT    cough; congestion; runny nose; check right ear      History of Present Illness   patient with productive cough and sinus drainage for the past 10-14 days  nose has been congested  no fevers  has hx of recurrent sinusitis    also right ear fullness and decreased hearing, noted to have wax obstruction at hearing aide checkup a few weeks ago, using OTC drops but not improving    discussed left ankle pain, chronic, hx of fusion, has been using celebrex in the am and ibuprofen 800mg qpm, ibuprofen working better, recommend switch to ibuprofen bid ongoing and stop celebrex         Health Status   Allergies:    Allergic Reactions (All)  No Known Medication Allergies  Canceled/Inactive Reactions (All)  No known allergies   Medications:  (Selected)   Prescriptions  Prescribed  CPAP machine and supplies: CPAP machine and supplies, See Instructions, Instructions: USE for SANDEE, Supply, # 1 EA, 0 Refill(s), Type: Maintenance  rosuvastatin 10 mg oral tablet: = 1 tab(s), Oral, qhs, # 90 tab(s), 2 Refill(s), Type: Maintenance, Pharmacy: [facility] #[id number]  Documented Medications  Documented  CeleBREX: Oral, 0 Refill(s), Type: Maintenance  acetaminophen: prn, 0 Refill(s), Type: Maintenance   Problem list:    All Problems (Selected)  Tobacco Use Disorder, Continuous / ICD-9-.1 / Confirmed  Obstructive sleep apnea / SNOMED CT 852260020 / Confirmed  Obese / ICD-9-.00 / Probable      Histories   Past Medical History:    Active  Obstructive sleep apnea (SNOMED CT 866472339): Onset on 3/1/2011 at 50 years.  Comments:  3/17/2011 CDT 9:38 AM CDT - Tarun Nielson MD  AHI 13.2, RDI 20.9, CPAP  8  Tobacco Use Disorder, Continuous (ICD-9-.1)  Comments:  2011 CST 10:14 AM Jaylyn Valero  Oral tobacco  Obese (ICD-9-.00)   Family History:    Cancer  Father ()  Comments:  2011 10:18 AM Jaylyn Valero  CA of throat  Breast cancer  Mother ()     Procedure history:    Arthrodesis of ankle (453870634) on 2017 at 57 Years.  Comments:  2017 2:44 PM Noreen Rocha  Left ankle and bone grafting.  Hardware removal x 1 screw.  Left ankle surgery on 2016 at 56 Years.  Comments:  2016 9:34 AM Noreen Rocha  Open debridement with loose body removal.   Subtalar corrective fusion with platelet derived growth factor augment graft.   !st metatarsal dorsiflexioin osteotomy.  Lateral ligament reconstruction.  Peroneus longus debridement and tenodesis of brevis.  Colonoscopy (495444025) on 2011 at 50 Years.  Comments:  2011 7:41 PM CST Stacy Mcgovern MA  Normal colonoscopy, to come back in 10 years, and use propofol for next colonoscopy.  Reconstruction of facial bones (615128658) in  at 38 Years.  Comments:  2011 10:16 AM Jaylyn Martinez  Status post trauma  Tonsillectomy and adenoidectomy (694742495) in 1963 at 3 Years.   Social History:        Alcohol Assessment: Past            ., 2 drinks/episode average.      Tobacco Assessment: Past            Current, Snuff                     Comments:                      2010 - Halina Eric LPN                     one tin per week      Substance Abuse Assessment: Denies Substance Abuse            Never      Employment and Education Assessment            Employed, Work/School description: .      Home and Environment Assessment            Marital status: .  Spouse/Partner name: Sandrita.      Nutrition and Health Assessment            Type of diet: Regular.      Exercise and Physical Activity Assessment: Regular exercise      Other Assessment            .       Physical Examination   Vital Signs   5/21/2019 1:30 PM CDT Temperature Tympanic 98.1 DegF    Peripheral Pulse Rate 82 bpm    HR Method Manual    Systolic Blood Pressure 120 mmHg    Diastolic Blood Pressure 78 mmHg    Mean Arterial Pressure 92 mmHg    BP Method Manual    Oxygen Saturation 95 %      Measurements from flowsheet : Measurements   5/21/2019 1:30 PM CDT Height Measured - Standard 73.5 in    Weight Measured - Standard 244.8 lb    BSA 2.4 m2    Body Mass Index 31.86 kg/m2  HI      General:  Alert and oriented, No acute distress.    Eye:  Pupils are equal, round and reactive to light, Normal conjunctiva.    HENT:  Normocephalic, Oral mucosa is moist, No pharyngeal erythema, right TM obstructed by wax, left TM normal.         Sinus: Bilateral, Frontal sinus, Tenderness.    Neck:  Supple, Non-tender.    Respiratory:  Lungs are clear to auscultation.    Cardiovascular:  Normal rate, Regular rhythm.       Procedure   Ear foreign body removal procedure   Confirmed: patient, procedure, side, safety procedures followed, cerumen impaction seen by otoscope, TM completely obscured.     Performed by: primary care provider.     Indication: ear fullness, decreased hearing.     Location: right ear.     Preparation and technique: positioned sitting upright, method including (curette (lighted), otologic syringe).     Results: foreign body removal complete.     Procedure tolerated: well.     Complications: TM normal in appearance on exam.        Impression and Plan   Diagnosis     Recurrent sinus infections (CNC68-HL J32.9).     Plan:  Signs and symptoms and over the counter treatment of congestion discussed.  Should resolve over 5-7 days from start of antibiotic.  Return to clinic if severe headache, difficulty swallowing, chest pain, or if symptoms become more severe or any other concerns.  Call with questions..    Diagnosis     Impacted cerumen of right ear (ADF77-WD H61.21).     Orders     Patient tolerated well with  improvement in symptoms prior to leaving clinic.  Recommended continue to allow water to enter ear(s) when bathing to flush ears at home.  Avoid q-tips.  Follow up if symptoms recur..     Diagnosis     Chronic pain of left ankle (ORZ63-RQ M25.572).     Orders     will try ibuprofen 800mg bid, stop celebrex, and monitor. Discussed potential side effects of medication..

## 2022-02-16 NOTE — TELEPHONE ENCOUNTER
Entered by Radha Moreno CMA on January 02, 2019 8:59:10 AM CST  ---------------------  From: Radha Moreno CMA   To: [facility] #[id number]    Sent: 1/2/2019 8:59:10 AM CST  Subject: Medication Management     ** Submitted: **  Order:rosuvastatin (rosuvastatin 10 mg oral tablet)  1 tab(s)  Oral  qhs  Qty:  90 tab(s)        Refills:  2          Substitutions Allowed     Route To Pharmacy - [facility] #[facility]    Signed by Radha Moreno CMA  1/2/2019 8:58:00 AM    ** Submitted: **  Complete:rosuvastatin (Crestor 10 mg oral tablet)   Signed by Radha Moreno CMA  1/2/2019 8:59:00 AM    ** Not Approved:  **  rosuvastatin (ROSUVASTATIN 10 MG  TAB APOT)  TAKE 1 TABLET BY MOUTH AT BEDTIME  Qty:  30 tab(s)        Days Supply:  0        Refills:  0          DANIEL     Route To Pharmacy - [facility] #[facility]   Signed by Radha Moreno CMA            ** Patient matched by Radha Moreno CMA on 1/2/2019 8:57:19 AM CST **      ------------------------------------------  From: [facility] #[facility]  To: Kimo Benitez PA-C  Sent: January 2, 2019 7:47:38 AM CST  Subject: Medication Management  Due: January 3, 2019 7:47:38 AM CST    ** On Hold Pending Signature **  Drug: rosuvastatin (Crestor 10 mg oral tablet)  TAKE 1 TABLET BY MOUTH AT BEDTIME  Quantity: 90 tab(s)     Days Supply: 0         Refills: 0  Substitutions Allowed  Notes from Pharmacy:     Dispensed Drug: rosuvastatin (rosuvastatin 10 mg oral tablet)  TAKE 1 TABLET BY MOUTH AT BEDTIME  Quantity: 30 tab(s)     Days Supply: 0         Refills: 0  Substitutions Allowed  Notes from Pharmacy:   ------------------------------------------  PCP:  [facility]    Medication:  Rosuvastatin  Last Filled:  9/10/18    Quantity: 90  Refills:  0    Date of last office visit and reason:  8/30/18 PX  Date of last labs pertaining to condition:  12/26/18 lipid recheck    Note:  refilled      Return to Clinic order placed?  y

## 2022-02-16 NOTE — TELEPHONE ENCOUNTER
Entered by Dina Waterman CMA on November 11, 2020 11:24:40 AM CST  PCP:   [facility]    Medication:   Rosuvastatin 10mg  Last Filled:  10/10/2019  Quantity:  90 Refills:  3    Date of last office visit and reason:   10/12/2020 annual  Date of last labs pertaining to condition:  10/12/2020    Note:  Pt was just seen for annual and not filled, OK for 1 year supply?    Return to Clinic order placed?  yes, 1 year    Resource:   eRx pool  Phone:   [phone]       ** Patient matched by Dina Waterman CMA on 11/11/2020 11:22:00 AM CST **      ------------------------------------------  From: [facility]  To: Kimo Benitez PA-C  Sent: November 11, 2020 9:05:42 AM CST  Subject: Medication Management  Due: November 7, 2020 12:21:51 AM CST     ** On Hold Pending Signature **     Drug: rosuvastatin (rosuvastatin 10 mg oral tablet), TAKE ONE TABLET BY MOUTH AT BEDTIME  Quantity: 90 EA  Days Supply: 90  Refills: 3  Substitutions Allowed  Notes from Pharmacy:     Dispensed Drug: rosuvastatin (rosuvastatin 10 mg oral tablet), TAKE ONE TABLET BY MOUTH AT BEDTIME  Quantity: 90 EA  Days Supply: 90  Refills: 3  Substitutions Allowed  Notes from Pharmacy:  ---------------------------------------------------------------  From: Dina Waterman CMA (eRx Pool ([id number]))   To: [facility] Message Pool ([id number]_[employer]);     Sent: 11/11/2020 11:24:56 AM CST  Subject: FW: Medication Management   Due Date/Time: 11/12/2020 9:05:00 AM CST---------------------  From: Radha Moreno CMA   To: [facility]    Sent: 11/11/2020 12:44:00 PM CST  Subject: FW: Medication Management     ** Submitted: **  Order:rosuvastatin (rosuvastatin 10 mg oral tablet)  1 tab(s)  Oral  qhs  Qty:  90 EA        Days Supply:  90        Refills:  3          Substitutions Allowed     Route To Pharmacy - [facility]    Signed by Radha Moreno CMA  11/11/2020 6:43:00 PM [facility]    ** Submitted: **  Complete:rosuvastatin (rosuvastatin 10 mg oral tablet)   Signed by  Radha Moreno CMA  11/11/2020 6:43:00 PM [facility]    ** Not Approved:  **  rosuvastatin (ROSUVASTATIN CALCIUM 10MG TABLET)  TAKE ONE TABLET BY MOUTH AT BEDTIME  Qty:  90 EA        Days Supply:  90        Refills:  3          Substitutions Allowed     Route To Pharmacy - [facility]   Signed by Radha Moreno CMA

## 2022-02-16 NOTE — PROGRESS NOTES
Chief Complaint  Pt presents today for sinus congestion, cough that is worse at night, and post nasal drainage x 4 days. He is prone to yearly sinus unfection. In 98' he was hit in the face by a horse and has multiple plates in his face. Augmentin has helped historically  History of Present Illness  Patient is here with several day history of cough, congestion and postnasal drainage. ?He has history of sinus infection in the past. ?See chief complaint  Review of Systems  Negative except for above  Problem List/Past Medical History  Ongoing  Obese  Obstructive sleep apnea  Tobacco Use Disorder, Continuous  Resolved  No resolved problems  Procedure/Surgical History  Left ankle surgery (07/01/2016),  Colonoscopy (01/20/2011),  Reconstruction of facial bones (1998),  Tonsillectomy and adenoidectomy (1963).  Home Medications  acetaminophen, prn  amoxicillin-clavulanate 875 mg-125 mg oral tablet, 1 tab(s), po, q12 hrs  Mobic 15 mg oral tablet, 15 mg, 1 tab(s), po, daily, 3 refills  Robitussin-AC 10 mg-100 mg/5 mL oral syrup, 10 mL, po, q6 hrs, PRN  Allergies  No Known Medication Allergies  Social History  Exercise and Physical Activity - Regular exercise  Tobacco  Current, Snuff  Family History  Breast cancer: Mother.  Cancer: Father.  Immunizations  Up-to-date  Physical Exam  Vitals & Measurements  T:?98.2?(Tympanic)?  HR:?88?(Peripheral)?  BP:?148/88?  SpO2:?97%?  HT:?74?in?  WT:?247?lb?  BMI:?31.71?  Alert, oriented, no acute distress  Ear canals are patent TMs clear  Frontal sinus tenderness  Throat is clear some posterior pharyngeal drainage  Neck is supple without adenopathy  Lungs are clear  Heart has a regular rate and rhythm  Assessment/Plan  Acute frontal sinusitis, unspecified  Given his past history we will go ahead and treat with amoxicillin clavulanate, fever therapy, analgesics and follow-up if symptoms do not improve  Orders:  amoxicillin-clavulanate, 1 tab(s), po, q12 hrs, # 20 tab(s), 0 Refill(s), Type:  Soft Stop, Pharmacy: [facility] #[id number], 1 tab(s) po q12 hrs

## 2022-02-16 NOTE — NURSING NOTE
Comprehensive Intake Entered On:  10/14/2021 9:57 AM CDT    Performed On:  10/14/2021 9:51 AM CDT by Radha Moreno CMA               Summary   Chief Complaint :   Physical, fasting for blood work, flu shot   Advance Directive :   Yes   Menstrual Status :   N/A   Weight Measured :   257.1 lb(Converted to: 257 lb 2 oz, 116.619 kg)    Height Measured :   73.5 in(Converted to: 6 ft 1 in, 186.69 cm)    Body Mass Index :   33.46 kg/m2 (HI)    Body Surface Area :   2.46 m2   Systolic Blood Pressure :   158 mmHg (HI)    Diastolic Blood Pressure :   106 mmHg (HI)    Mean Arterial Pressure :   123 mmHg   Peripheral Pulse Rate :   69 bpm   BP Site :   Right arm   BP Method :   Electronic   HR Method :   Electronic   Radha Moreno CMA - 10/14/2021 9:51 AM CDT   Health Status   Allergies Verified? :   Yes   Medication History Verified? :   Yes   Immunizations Current :   Yes   Medical History Verified? :   Yes   Pre-Visit Planning Status :   Completed   Tobacco Use? :   Never smoker   Radha Moreno CMA - 10/14/2021 9:51 AM CDT   Consents   Consent for Immunization Exchange :   Consent Granted   Consent for Immunizations to Providers :   Consent Granted   Radha Moreno CMA - 10/14/2021 9:51 AM CDT   Meds / Allergies   (As Of: 10/14/2021 9:57:12 AM CDT)   Allergies (Active)   No Known Medication Allergies  Estimated Onset Date:   Unspecified ; Created By:   Daly Amaro CMA; Reaction Status:   Active ; Category:   Drug ; Substance:   No Known Medication Allergies ; Type:   Allergy ; Updated By:   Daly Amaro CMA; Reviewed Date:   10/14/2021 9:54 AM CDT        Medication List   (As Of: 10/14/2021 9:57:12 AM CDT)   Prescription/Discharge Order    Miscellaneous Rx Supply  :   Miscellaneous Rx Supply ; Status:   Prescribed ; Ordered As Mnemonic:   CPAP machine and supplies ; Simple Display Line:   See Instructions, USE for SANDEE, 1 EA, 0 Refill(s) ; Ordering Provider:   Kimo Benitez PA-C; Catalog Code:   Miscellaneous Rx  Supply ; Order Dt/Tm:   3/10/2018 3:25:45 PM CST          rosuvastatin  :   rosuvastatin ; Status:   Prescribed ; Ordered As Mnemonic:   rosuvastatin 10 mg oral tablet ; Simple Display Line:   1 tab(s), Oral, qhs, 90 EA, 3 Refill(s) ; Ordering Provider:   Kimo Benitez PA-C; Catalog Code:   rosuvastatin ; Order Dt/Tm:   11/11/2020 12:43:51 PM CST            Home Meds    ibuprofen  :   ibuprofen ; Status:   Documented ; Ordered As Mnemonic:   ibuprofen ; Simple Display Line:   200 mg, 0 Refill(s) ; Catalog Code:   ibuprofen ; Order Dt/Tm:   2/8/2021 10:00:18 AM CST            Social History   Social History   (As Of: 10/14/2021 9:57:12 AM CDT)   Alcohol:        Current, Beer (12 oz), Daily, 2 drinks/episode average.  Ready to change: No.   (Last Updated: 10/15/2020 3:25:16 PM CDT by Catherine Cabrera)          Tobacco:        Quit 2016, Snuff, 20 year(s).   (Last Updated: 10/15/2020 3:25:39 PM CDT by Catherine Cabrera)          Electronic Cigarette/Vaping:        Electronic Cigarette Use: Never.   (Last Updated: 10/15/2020 3:25:44 PM CDT by Catherine Cabrera)          Substance Abuse:        Never   (Last Updated: 9/6/2018 8:36:31 AM CDT by Noreen Gannon)          Employment/School:        Employed, Work/School description: Self Employed Farmer.  Highest education level: High school.   (Last Updated: 10/15/2020 3:26:01 PM CDT by Catherine Cabrera)          Home/Environment:        Marital status: .  Spouse/Partner name: Sandrita.  Job children.  Living situation: Home/Independent.  Injuries/Abuse/Neglect in household: No.  Feels unsafe at home: No.  Family/Friends available for support: Yes.   (Last Updated: 10/15/2020 3:30:59 PM CDT by Catherine Cabrera)          Nutrition/Health:        Type of diet: Regular.  Sleeping concerns: No.  Feels highly stressed: No.   (Last Updated: 10/15/2020 3:30:15 PM CDT by Catherine Cabrera)          Exercise:        Exercise frequency: Daily.  Exercise type: Farming.   (Last Updated: 10/15/2020 3:30:30 PM CDT  by Catherine Cabrera)          Sexual:        Sexually active: Yes.  Identifies as male, Sexual orientation: Straight or heterosexual.  History of STD: No.  Contraceptive Use Details: None.  History of sexual abuse: No.   (Last Updated: 10/15/2020 3:30:53 PM CDT by Catherine Cabrera)          Other:        Hearing impairment.   (Last Updated: 4/9/2020 9:34:26 AM CDT by Noreen Gannon)

## 2022-02-16 NOTE — TELEPHONE ENCOUNTER
Entered by Gia River MA on October 10, 2019 3:47:56 PM CDT  ---------------------  From: Gia River MA   To: [facility]    Sent: 10/10/2019 3:47:56 PM CDT  Subject: Medication Management     ** Submitted: **  Order:rosuvastatin (rosuvastatin 10 mg oral tablet)  1 tab(s)  Oral  qhs  Qty:  90 tab(s)        Refills:  3          Substitutions Allowed     Route To Pharmacy - [facility]    Signed by Gia River MA  10/10/2019 3:47:00 PM    ** Submitted: **  Complete:rosuvastatin (rosuvastatin 10 mg oral tablet)   Signed by Gia River MA  10/10/2019 3:47:00 PM    ** Not Approved:  **  rosuvastatin (ROSUVASTATIN TAB 10MG TABLET)  TAKE 1 TABLET BY MOUTH AT BEDTIME  Qty:  90 unknown unit        Days Supply:  0        Refills:  0          Substitutions Allowed     Route To Pharmacy - [facility]   Signed by Gia River MA            ** Patient matched by Gia River MA on 10/10/2019 3:47:14 PM CDT **      ------------------------------------------  From: [facility]  To: Kimo Benitez PA-C  Sent: October 10, 2019 8:42:40 AM CDT  Subject: Medication Management  Due: October 11, 2019 8:42:40 AM CDT    ** On Hold Pending Signature **  Drug: rosuvastatin (Crestor 10 mg oral tablet)  TAKE 1 TABLET BY MOUTH AT BEDTIME  Quantity: 90 unknown unit  Days Supply: 0  Refills: 0  Substitutions Allowed  Notes from Pharmacy:     Dispensed Drug: rosuvastatin (rosuvastatin 10 mg oral tablet)  TAKE 1 TABLET BY MOUTH AT BEDTIME  Quantity: 90 unknown unit  Days Supply: 0  Refills: 0  Substitutions Allowed  Notes from Pharmacy:   ------------------------------------------Date of last office visit and reason:  9/25/19      Date of last Med Check / Px:   9/25/19  Date of last labs pertaining to med:  9/25/19    RTC order in chart:  Yes     For Protocol refill, has patient been contacted:  n/a

## 2022-02-16 NOTE — PROGRESS NOTES
Patient:   MANDO NEGRO            MRN: [id number]            FIN: [id number]               Age:   61 years     Sex:  Male     :  1960   Associated Diagnoses:   Acute recurrent maxillary sinusitis; Dry skin dermatitis   Author:   Kimo Benitez PA-C      Visit Information      Date of Service: 2021 10:22 am  Performing Location: [facility]  Encounter#: [id number]      Primary Care Provider (PCP):  Kimo Benitez PA-C    NPI# [id number]      Referring Provider:  Kimo Benitez PA-C    NPI# [id number]   Visit type:  New symptom.    Source of history:  Self.    Referral source:  Self.    History limitation:  None.       Chief Complaint   2021 10:25 AM CST  possible sinus infection since this past weekend        History of Present Illness             The patient presents with sinus problem.  The sinus problem is located in the maxillary sinus.  The sinus problem is characterized by nasal congestion, rhinorrhea and facial pain.  The severity of the sinus problem is moderate.  The sinus problem is episodic, fluctuates in intensity and is worsening.  The sinus problem has lasted for 5 day(s).  The context of the sinus problem: occurred in association with illness.  No C-19 [id number]. History of recurrent sinusitis.  Associated symptoms consist of denies fever and denies cough.  CC above noted and confirmed with the patient..        Review of Systems   Constitutional:  Negative except as documented in history of present illness.    Eye:  Negative.    Ear/Nose/Mouth/Throat:  Negative except as documented in history of present illness.    Respiratory:  Negative except as documented in history of present illness.       Health Status   Allergies:    Allergic Reactions (All)  No Known Medication Allergies  Canceled/Inactive Reactions (All)  No known allergies   Medications:  (Selected)   Prescriptions  Prescribed  CPAP machine and supplies: CPAP machine and supplies, See Instructions,  Instructions: USE for SANDEE, Supply, # 1 EA, 0 Refill(s), Type: Maintenance  amoxicillin-clavulanate 875 mg-125 mg oral tablet: = 1 tab(s), Oral, q12 hrs, x 10 day(s), # 20 tab(s), 0 Refill(s), Type: Acute, Pharmacy: [facility], 1 tab(s) Oral q12 hrs,x10 day(s), 73.5, in, 12/22/21 10:25:00 CST, Height Measured, 256, lb, 12/22/21 10:25:00 CST, Weight Measured  rosuvastatin 10 mg oral tablet: = 1 tab(s), Oral, qhs, # 90 tab(s), 3 Refill(s), Type: Maintenance, Pharmacy: [facility], 1 tab(s) Oral qhs, 73.5, in, 10/14/21 9:51:00 CDT, Height Measured, 257.1, lb, 10/14/21 9:51:00 CDT, Weight Measured  triamcinolone 0.1% topical cream: 1 devika, TOP, bid, # 30 g, 0 Refill(s), Type: Maintenance, Pharmacy: [facility], 1 devika Topical bid, 73.5, in, 12/22/21 10:25:00 CST, Height Measured, 256, lb, 12/22/21 10:25:00 CST, Weight Measured  Documented Medications  Documented  ibuprofen: ( 200 mg ), 0 Refill(s), Type: Maintenance,    Medications          *denotes recorded medication          CPAP machine and supplies: See Instructions, USE for SANDEE, 1 EA, 0 Refill(s).          amoxicillin-clavulanate 875 mg-125 mg oral tablet: 1 tab(s), Oral, q12 hrs, for 10 day(s), 20 tab(s), 0 Refill(s).          *ibuprofen: 200 mg, 0 Refill(s).          rosuvastatin 10 mg oral tablet: 1 tab(s), Oral, qhs, 90 tab(s), 3 Refill(s).          triamcinolone 0.1% topical cream: 1 devika, TOP, bid, 30 g, 0 Refill(s).       Problem list:    All Problems  Obstructive sleep apnea / SNOMED CT 126278567 / Confirmed  Obesity, unspecified / SNOMED CT 8244507979 / Probable  History of total knee arthroplasty / SNOMED CT 8525789318 / Confirmed  Dyslipidemia / SNOMED CT 1382759731 / Confirmed  Chronic pain of left ankle / SNOMED CT 2186871475 / Confirmed  Inactive: CTS - Carpal tunnel syndrome / SNOMED CT 683035440  Resolved: Tobacco use / SNOMED CT 179974384  Resolved: Inpatient stay / SNOMED CT 811911117      Histories   Past Medical History:    Active  Obstructive  sleep apnea (175842324): Onset on 3/1/2011 at 50 years.  Comments:  3/17/2011 CDT 9:38 AM CDT - Tarun Nielson MD  AHI 13.2, RDI 20.9, CPAP 8  Obesity, unspecified (5287240442)  Chronic pain of left ankle (9769100148)  Resolved  Inpatient stay (818572539): Onset on 2021 at 61 years.  Resolved on 2021 at 61 years.  Comments:  12/3/2021 CST 7:19 AM Noreen Boogie  [facility] [place], [place] - Right knee Uni revision.  Tobacco use (696739911):  Resolved.  Comments:  2011 CST 10:14 AM Jaylyn Valero  Oral tobacco   Family History:    Cancer  Father ()  Comments:  2011 10:18 AM Jaylyn Valero  [place] of throat  Breast cancer  Mother ()     Procedure history:    Revision of knee arthroplasty (039268617) on 2021 at 61 Years.  Comments:  12/3/2021 7:21 AM Noreen Boogie  Right.  Screening for malignant neoplasm of colon (8059232245) on 2021 at 60 Years.  Comments:  2021 8:21 AM Karen Everett - Negative  Arthroplasty of knee (61653639) on 2019 at 59 Years.  Comments:  2020 1:34 PM Noreen Boogie  Unicompartmental right knee.  Arthrodesis of ankle (165848922) on 2017 at 57 Years.  Comments:  2017 2:44 PM Noreen Rocha  Left ankle and bone grafting.  Hardware removal x 1 screw.  Left ankle surgery on 2016 at 56 Years.  Comments:  2016 9:34 AM Noreen Rocha  Open debridement with loose body removal.   Subtalar corrective fusion with platelet derived growth factor augment graft.   !st metatarsal dorsiflexioin osteotomy.  Lateral ligament reconstruction.  Peroneus longus debridement and tenodesis of brevis.  Colonoscopy (588965548) on 2011 at 50 Years.  Comments:  2011 7:41 PM ALONSO - Stacy Peña MA  Normal colonoscopy, to come back in 10 years, and use propofol for next colonoscopy.  Reconstruction of facial bones (661613673) in  at 38 Years.  Comments:  2011  10:16 AM CST - Jaylyn Sanchez  Status post trauma  Tonsillectomy and adenoidectomy (869854667) in 1963 at 3 Years.   Social History:        Electronic Cigarette/Vaping Assessment            Electronic Cigarette Use: Never.      Alcohol Assessment            Current, Beer (12 oz), Daily, 2 drinks/episode average.  Ready to change: No.      Tobacco Assessment            Quit 2016, Snuff, 20 year(s).      Substance Abuse Assessment            Never      Employment and Education Assessment            Employed, Work/School description: Self Employed Farmer.  Highest education level: High school.      Home and Environment Assessment            Marital status: .  Spouse/Partner name: Sandrita.  Job children.  Living situation: Home/Independent.               Injuries/Abuse/Neglect in household: No.  Feels unsafe at home: No.  Family/Friends available for support:               Yes.      Nutrition and Health Assessment            Type of diet: Regular.  Wants to lose weight: Yes.  Sleeping concerns: No.  Feels highly stressed: No.      Exercise and Physical Activity Assessment            Exercise frequency: Daily.  Exercise type: Farming.      Sexual Assessment            Sexually active: Yes.  Identifies as male, Sexual orientation: Straight or heterosexual.  History of STD: No.               Contraceptive Use Details: None.  History of sexual abuse: No.      Other Assessment            Hearing impairment.        Physical Examination   Vital Signs   12/22/2021 10:25 AM CST Peripheral Pulse Rate 86 bpm    HR Method Electronic    Systolic Blood Pressure 178 mmHg  HI    Diastolic Blood Pressure 108 mmHg  HI    Mean Arterial Pressure 131 mmHg    BP Site Right arm    BP Method Electronic      Measurements from flowsheet : Measurements   12/22/2021 10:25 AM CST Height Measured - Standard 73.5 in    Weight Measured - Standard 256.0 lb    BSA 2.45 m2    Body Mass Index 33.31 kg/m2  HI      General:  Alert and oriented, No acute  distress.    Eye:  Pupils are equal, round and reactive to light, Extraocular movements are intact, Normal conjunctiva.    HENT:  Normocephalic, Tympanic membranes are clear, Oral mucosa is moist.         Nose: Both nostrils, Discharge ( Small amount, Purulent ).         Sinus: Bilateral, Maxillary sinus, Tenderness.    Neck:  Supple, Non-tender, No lymphadenopathy.    Respiratory:  Lungs are clear to auscultation, Respirations are non-labored.    Cardiovascular:  Normal rate, Regular rhythm, No murmur.    Integumentary:  Dry skin left thigh.       Impression and Plan   Diagnosis     Acute recurrent maxillary sinusitis (ODT92-PF J01.01).     Dry skin dermatitis (JPD93-MZ L85.3).     Patient Instructions:       Counseled: Patient, Regarding diagnosis, Regarding medications, Activity, Verbalized understanding.    Summary:  Take medicine as prescribed, side effects discussed.  Tylenol/ibuprofen for fever and discomfort.  Push fluids.  RTC if not improving in 36-48 hours, prior if concerns as we have discussed.  .    Orders     Orders (Selected)   Prescriptions  Prescribed  amoxicillin-clavulanate 875 mg-125 mg oral tablet: = 1 tab(s), Oral, q12 hrs, x 10 day(s), # 20 tab(s), 0 Refill(s), Type: Acute, Pharmacy: [facility], 1 tab(s) Oral q12 hrs,x10 day(s), 73.5, in, 12/22/21 10:25:00 CST, Height Measured, 256, lb, 12/22/21 10:25:00 CST, Weight Measured  triamcinolone 0.1% topical cream: 1 devika, TOP, bid, # 30 g, 0 Refill(s), Type: Maintenance, Pharmacy: [facility], 1 devika Topical bid, 73.5, in, 12/22/21 10:25:00 CST, Height Measured, 256, lb, 12/22/21 10:25:00 CST, Weight Measured.     Take medicine as prescribed, side effects discussed.  Tylenol/ibuprofen for fever and discomfort.  Push fluids.  RTC if not improving in 36-48 hours, prior if concerns as we have discussed.

## 2022-02-16 NOTE — TELEPHONE ENCOUNTER
---------------------  From: Kimo Benitez PA-C   To: [employer] ([id number]);     Sent: 10/3/2019 11:22:52 AM CDT  Subject: General Message     If he calls, tell him his MRI shows arthritis and a medial cartilage tear.  I want him to see ortho.  I can place order when I return or please have [facility] or [facility] do for him.    KAHTime: 2:19 pm   Note: Patient was notified. Would like to see Dr. Lyles.---------------------  From: Radha Moreno CMA (Phone Messages [employer] ([id number]))   To: Hermila Javier MD;     Sent: 10/3/2019 2:21:04 PM CDT  Subject: FW: General Message  ** Submitted: **  Order:Referral (Request)  requests Dr. Lyles  Details:  10/3/2019 2:41 PM CDT, Referred to: Orthopaedics, Additional instructions: requests Dr. Lyles, Tear of meniscus of knee         Signed by Hermila Javier MD  10/3/2019 2:41:00 PM---------------------  From: Hermila Javier MD   To: [employer] ([id number]_[place]);     Sent: 10/3/2019 2:41:38 PM CDT  Subject: RE: General Messageorder faxed to referrals

## 2022-02-16 NOTE — NURSING NOTE
Comprehensive Intake Entered On:  11/30/2021 12:02 PM CST    Performed On:  11/30/2021 11:53 AM CST by Francisco Gaston LPN               Summary   Chief Complaint :   Pre-Op Physical Exam, RIght knee repair of failed replacement, [facility], tomorrow 12-1-21.   Advance Directive :   Yes   Menstrual Status :   N/A   Weight Measured :   255 lb(Converted to: 255 lb 0 oz, 115.666 kg)    Height Measured :   73.5 in(Converted to: 6 ft 1 in, 186.69 cm)    Body Mass Index :   33.18 kg/m2 (HI)    Body Surface Area :   2.45 m2   Systolic Blood Pressure :   132 mmHg (HI)    Diastolic Blood Pressure :   78 mmHg   Mean Arterial Pressure :   96 mmHg   Peripheral Pulse Rate :   70 bpm   BP Site :   Right arm   Pulse Site :   Radial artery   BP Method :   Manual   HR Method :   Manual   Francisco Gaston LPN - 11/30/2021 11:53 AM CST   Problems   (As Of: 11/30/2021 12:02:41 PM CST)   Problems(Active)    Chronic pain of left ankle (SNOMED CT  :3250517267 )  Name of Problem:   Chronic pain of left ankle ; Recorder:   Hermila Javier MD; Confirmation:   Confirmed ; Classification:   Medical ; Code:   1547964629 ; Contributor System:   How do you roll? ; Last Updated:   12/20/2019 2:52 PM CST ; Life Cycle Status:   Active ; Responsible Provider:   Hermila Javier MD; Vocabulary:   SNOMED CT        Dyslipidemia (SNOMED CT  :8803524873 )  Name of Problem:   Dyslipidemia ; Recorder:   Tarun Nielson MD; Confirmation:   Confirmed ; Classification:   Medical ; Code:   4951038318 ; Contributor System:   How do you roll? ; Last Updated:   2/8/2021 10:12 AM CST ; Life Cycle Date:   2/8/2021 ; Life Cycle Status:   Active ; Responsible Provider:   Tarun Nielson MD; Vocabulary:   SNOMED CT        History of total knee arthroplasty (SNOMED CT  :1109161129 )  Name of Problem:   History of total knee arthroplasty ; Onset Date:   12/31/2019 ; Recorder:   Francisco Gaston LPN; Confirmation:   Confirmed ; Classification:   Medical ; Code:    6745623432 ; Contributor System:   PowerChart ; Last Updated:   11/30/2021 11:42 AM CST ; Life Cycle Date:   11/30/2021 ; Life Cycle Status:   Active ; Responsible Provider:   Francisco Gaston LPN; Vocabulary:   SNOMED CT        Obesity, unspecified (SNOMED CT  :2456282320 )  Name of Problem:   Obesity, unspecified ; Recorder:   SYSTEM; Confirmation:   Probable ; Classification:   Medical ; Code:   1096363024 ; Contributor System:   PowerChart ; Last Updated:   12/20/2019 2:52 PM CST ; Life Cycle Status:   Active ; Vocabulary:   SNOMED CT        Obstructive sleep apnea (SNOMED CT  :348847948 )  Name of Problem:   Obstructive sleep apnea ; Onset Date:   3/1/2011 ; Recorder:   Tarun Nielson MD; Confirmation:   Confirmed ; Classification:   Medical ; Code:   560017927 ; Contributor System:   Keelr ; Last Updated:   3/2/2015 11:33 AM CST ; Life Cycle Date:   3/17/2011 ; Life Cycle Status:   Active ; Responsible Provider:   Tarun Nielson MD; Vocabulary:   SNOMED CT   ; Comments:        3/17/2011 9:38 AM - Tarun Nielson MD  AHI 13.2, RDI 20.9, CPAP 8        Meds / Allergies   (As Of: 11/30/2021 12:02:41 PM CST)   Allergies (Active)   No Known Medication Allergies  Estimated Onset Date:   Unspecified ; Created By:   Daly Amaro CMA; Reaction Status:   Active ; Category:   Drug ; Substance:   No Known Medication Allergies ; Type:   Allergy ; Updated By:   Daly Amaro CMA; Reviewed Date:   11/30/2021 11:53 AM CST        Medication List   (As Of: 11/30/2021 12:02:41 PM CST)   Prescription/Discharge Order    Miscellaneous Rx Supply  :   Miscellaneous Rx Supply ; Status:   Prescribed ; Ordered As Mnemonic:   CPAP machine and supplies ; Simple Display Line:   See Instructions, USE for SANDEE, 1 EA, 0 Refill(s) ; Ordering Provider:   Kimo Benitez PA-C; Catalog Code:   Miscellaneous Rx Supply ; Order Dt/Tm:   3/10/2018 3:25:45 PM CST          rosuvastatin  :   rosuvastatin ; Status:   Prescribed ; Ordered As  Mnemonic:   rosuvastatin 10 mg oral tablet ; Simple Display Line:   1 tab(s), Oral, qhs, 90 tab(s), 3 Refill(s) ; Ordering Provider:   Kimo Benitez PA-C; Catalog Code:   rosuvastatin ; Order Dt/Tm:   10/14/2021 10:00:36 AM CDT            Home Meds    ibuprofen  :   ibuprofen ; Status:   Documented ; Ordered As Mnemonic:   ibuprofen ; Simple Display Line:   200 mg, 0 Refill(s) ; Catalog Code:   ibuprofen ; Order Dt/Tm:   2/8/2021 10:00:18 AM CST            Social History   Social History   (As Of: 11/30/2021 12:02:41 PM CST)   Alcohol:        Current, Beer (12 oz), Daily, 2 drinks/episode average.  Ready to change: No.   (Last Updated: 11/19/2021 10:21:11 AM CST by Sharyn Jordan)          Tobacco:        Quit 2016, Snuff, 20 year(s).   (Last Updated: 11/19/2021 10:21:38 AM CST by Sharyn Jordan)          Electronic Cigarette/Vaping:        Electronic Cigarette Use: Never.   (Last Updated: 11/30/2021 11:53:40 AM CST by Francisco Gaston LPN)          Substance Abuse:        Never   (Last Updated: 11/19/2021 10:21:54 AM CST by Sharyn Jordan)          Employment/School:        Employed, Work/School description: Self Employed Farmer.  Highest education level: High school.   (Last Updated: 11/19/2021 10:26:22 AM CST by Sharyn Jordan)          Home/Environment:        Marital status: .  Spouse/Partner name: Frank Kaiser children.  Living situation: Home/Independent.  Injuries/Abuse/Neglect in household: No.  Feels unsafe at home: No.  Family/Friends available for support: Yes.   (Last Updated: 11/19/2021 10:26:58 AM CST by Sharyn Jordan)          Nutrition/Health:        Type of diet: Regular.  Wants to lose weight: Yes.  Sleeping concerns: No.  Feels highly stressed: No.   (Last Updated: 11/19/2021 10:27:25 AM CST by Sharyn Jordan)          Exercise:        Exercise frequency: Daily.  Exercise type: Farming.   (Last Updated: 11/19/2021 10:27:41 AM CST by Sharyn Jordan)          Sexual:         Sexually active: Yes.  Identifies as male, Sexual orientation: Straight or heterosexual.  History of STD: No.  Contraceptive Use Details: None.  History of sexual abuse: No.   (Last Updated: 11/19/2021 10:28:16 AM CST by Sharyn Jordan)          Other:        Hearing impairment.   (Last Updated: 4/9/2020 9:34:26 AM CDT by Noreen Gannon)

## 2022-02-16 NOTE — NURSING NOTE
Comprehensive Intake Entered On:  2/8/2021 10:02 AM CST    Performed On:  2/8/2021 9:57 AM CST by Kamila uBi               Summary   Chief Complaint :   Colonoscopy Consult    Advance Directive :   Yes   Menstrual Status :   N/A   Weight Measured :   255 lb(Converted to: 255 lb 0 oz, 115.666 kg)    Height Measured :   73.5 in(Converted to: 6 ft 1 in, 186.69 cm)    Body Mass Index :   33.18 kg/m2 (HI)    Body Surface Area :   2.45 m2   Systolic Blood Pressure :   156 mmHg (HI)    Diastolic Blood Pressure :   84 mmHg (HI)    Mean Arterial Pressure :   108 mmHg   Peripheral Pulse Rate :   80 bpm   BP Site :   Right arm   BP Method :   Manual   HR Method :   Electronic   Temperature Tympanic :   97.5 DegF(Converted to: 36.4 DegC)  (LOW)    Oxygen Saturation :   95 %   Kamila Bui - 2/8/2021 9:57 AM CST   Health Status   Allergies Verified? :   Yes   Medication History Verified? :   Yes   Immunizations Current :   Yes   Medical History Verified? :   Yes   Pre-Visit Planning Status :   Completed   Tobacco Use? :   Never smoker   Kamila Bui - 2/8/2021 9:57 AM CST   Consents   Consent for Immunization Exchange :   Consent Granted   Consent for Immunizations to Providers :   Consent Granted   Kamila Bui - 2/8/2021 9:57 AM CST   Meds / Allergies   (As Of: 2/8/2021 10:02:31 AM CST)   Allergies (Active)   No Known Medication Allergies  Estimated Onset Date:   Unspecified ; Created By:   Daly Amaro CMA; Reaction Status:   Active ; Category:   Drug ; Substance:   No Known Medication Allergies ; Type:   Allergy ; Updated By:   Daly Amaro CMA; Reviewed Date:   2/8/2021 9:59 AM CST        Medication List   (As Of: 2/8/2021 10:02:31 AM CST)   Prescription/Discharge Order    traMADol  :   traMADol ; Status:   Completed ; Ordered As Mnemonic:   traMADol 50 mg oral tablet ; Simple Display Line:   50 mg, 1 tab(s), Oral, q6 hrs, May fill at monthly intervals, 120 tab(s), 0 Refill(s) ; Ordering  Provider:   Kimo Benitez PA-C; Catalog Code:   traMADol ; Order Dt/Tm:   10/12/2020 10:24:24 AM CDT          rosuvastatin  :   rosuvastatin ; Status:   Prescribed ; Ordered As Mnemonic:   rosuvastatin 10 mg oral tablet ; Simple Display Line:   1 tab(s), Oral, qhs, 90 EA, 3 Refill(s) ; Ordering Provider:   Kimo Benitez PA-C; Catalog Code:   rosuvastatin ; Order Dt/Tm:   11/11/2020 12:43:51 PM CST          Miscellaneous Rx Supply  :   Miscellaneous Rx Supply ; Status:   Prescribed ; Ordered As Mnemonic:   CPAP machine and supplies ; Simple Display Line:   See Instructions, USE for SANDEE, 1 EA, 0 Refill(s) ; Ordering Provider:   Kimo Benitez PA-C; Catalog Code:   Miscellaneous Rx Supply ; Order Dt/Tm:   3/10/2018 3:25:45 PM CST            Home Meds    ibuprofen  :   ibuprofen ; Status:   Documented ; Ordered As Mnemonic:   ibuprofen ; Simple Display Line:   200 mg, 0 Refill(s) ; Catalog Code:   ibuprofen ; Order Dt/Tm:   2/8/2021 10:00:18 AM CST            ID Risk Screen   Recent Travel History :   No recent travel   Family Member Travel History :   No recent travel   Other Exposure to Infectious Disease :   Unknown   COVID-19 Testing Status :   No COVID-19 test performed   Kamila Bui 2/8/2021 9:57 AM CST

## 2022-02-16 NOTE — TELEPHONE ENCOUNTER
---------------------From: Marsha Stuart To:  <[email or web address]>;   Sent: 10/7/2019 6:53:49 AM CDTSubject: General Message Patient: MANDO NEGRO; YOB: 1960 The attached Referral Note is for an appointment to be scheduled.   Referral faxed to [facility], they will contact patient to schedule with Dr. Lyles.

## 2022-02-16 NOTE — LETTER
(Inserted Image. Unable to display)   February 22, 2021      MANDO NEGRO   [address], [place] [id number]        Dear MANDO,      Thank you for selecting [facility] ([facility]viously [facility] for your healthcare needs.       Your Cologuard test is negative.          Please contact me or my assistant at [phone] you have any questions or concerns.     Sincerely,        Tarun Nielson MD

## 2022-02-16 NOTE — NURSING NOTE
Comprehensive Intake Entered On:  10/12/2020 10:01 AM CDT    Performed On:  10/12/2020 9:53 AM CDT by Radha Moreno CMA               Summary   Chief Complaint :   Physical; Preop: DOS 11/9/2020 Left ankle surgery, [facility]   Advance Directive :   Yes   Menstrual Status :   N/A   Weight Measured :   240 lb(Converted to: 240 lb 0 oz, 108.862 kg)    Height Measured :   73.5 in(Converted to: 6 ft 1 in, 186.69 cm)    Body Mass Index :   31.23 kg/m2 (HI)    Body Surface Area :   2.37 m2   Systolic Blood Pressure :   142 mmHg (HI)    Diastolic Blood Pressure :   80 mmHg   Mean Arterial Pressure :   101 mmHg   Peripheral Pulse Rate :   70 bpm   BP Site :   Right arm   BP Method :   Manual   HR Method :   Electronic   Oxygen Saturation :   92 % (LOW)    Radha Moreno CMA - 10/12/2020 9:53 AM CDT   Health Status   Allergies Verified? :   Yes   Medication History Verified? :   Yes   Immunizations Current :   Yes   Medical History Verified? :   Yes   Pre-Visit Planning Status :   Completed   Tobacco Use? :   Former smoker   Radha Moreno CMA - 10/12/2020 9:53 AM CDT   Consents   Consent for Immunization Exchange :   Consent Granted   Consent for Immunizations to Providers :   Consent Granted   Radha Moreno CMA - 10/12/2020 9:53 AM CDT   Meds / Allergies   (As Of: 10/12/2020 10:01:36 AM CDT)   Allergies (Active)   No Known Medication Allergies  Estimated Onset Date:   Unspecified ; Created By:   Daly Amaro CMA; Reaction Status:   Active ; Category:   Drug ; Substance:   No Known Medication Allergies ; Type:   Allergy ; Updated By:   Daly Amaro CMA; Reviewed Date:   10/12/2020 10:00 AM CDT        Medication List   (As Of: 10/12/2020 10:01:36 AM CDT)   Prescription/Discharge Order    Miscellaneous Rx Supply  :   Miscellaneous Rx Supply ; Status:   Prescribed ; Ordered As Mnemonic:   CPAP machine and supplies ; Simple Display Line:   See Instructions, USE for SANDEE, 1 EA, 0 Refill(s) ; Ordering Provider:   Emmanuel WHITE,  Kimo; Catalog Code:   Miscellaneous Rx Supply ; Order Dt/Tm:   3/10/2018 3:25:45 PM CST          rosuvastatin  :   rosuvastatin ; Status:   Prescribed ; Ordered As Mnemonic:   rosuvastatin 10 mg oral tablet ; Simple Display Line:   1 tab(s), Oral, qhs, 90 tab(s), 3 Refill(s) ; Ordering Provider:   Kimo Benitez PA-C; Catalog Code:   rosuvastatin ; Order Dt/Tm:   10/10/2019 3:47:42 PM CDT          traMADol  :   traMADol ; Status:   Prescribed ; Ordered As Mnemonic:   traMADol 50 mg oral tablet ; Simple Display Line:   50 mg, 1 tab(s), Oral, q6 hrs, May fill at monthly intervals, 120 tab(s), 5 Refill(s) ; Ordering Provider:   Kimo Benitez PA-C; Catalog Code:   traMADol ; Order Dt/Tm:   3/30/2020 11:36:43 AM CDT            Home Meds    aspirin  :   aspirin ; Status:   Processing ; Ordered As Mnemonic:   aspirin 325 mg oral tablet ; Action Display:   Complete ; Catalog Code:   aspirin ; Order Dt/Tm:   10/12/2020 10:01:29 AM CDT            ID Risk Screen   Recent Travel History :   No recent travel   Family Member Travel History :   No recent travel   Other Exposure to Infectious Disease :   Unknown   Radha Moreno CMA - 10/12/2020 9:53 AM CDT   Social History   Social History   (As Of: 10/12/2020 10:01:36 AM CDT)   Alcohol:  Current      Beer (12 oz), Daily, 3 drinks/episode average.  Ready to change: No.   (Last Updated: 4/9/2020 9:34:06 AM CDT by Noreen Gannon)          Tobacco:  Past      Current, Snuff   Comments:  4/7/2010 3:25 PM - Halina Eric LPN: one tin per week   (Last Updated: 4/7/2010 3:25:38 PM CDT by Halina Eric LPN)   Quit 2015   (Last Updated: 4/9/2020 9:35:03 AM CDT by Noreen Gannon)          Substance Abuse:  Denies Substance Abuse      Never   (Last Updated: 9/6/2018 8:36:31 AM CDT by Noreen Gannon)          Employment/School:        Employed, Work/School description: .  Highest education level: High school.   (Last Updated: 4/9/2020 9:35:57 AM CDT by Noreen Gannon)           Home/Environment:        Marital status: .  Spouse/Partner name: Sandrita.  Living situation: Home/Independent.  Injuries/Abuse/Neglect in household: No.  Feels unsafe at home: No.  Family/Friends available for support: Yes.   (Last Updated: 4/9/2020 9:36:46 AM CDT by Noreen Gannon)          Nutrition/Health:        Type of diet: Regular.  Wants to lose weight: Yes.  Sleeping concerns: No.  Feels highly stressed: No.   (Last Updated: 4/9/2020 9:34:41 AM CDT by Noreen Gannon)          Exercise:  Regular exercise      Exercise type: Farming.   (Last Updated: 4/9/2020 9:35:22 AM CDT by Noreen Gannon)          Sexual:        Sexually active: Yes.  Identifies as male, History of STD: No.  History of sexual abuse: No.   (Last Updated: 4/9/2020 9:35:48 AM CDT by Noreen Gannon)          Other:        Hearing impairment.   (Last Updated: 4/9/2020 9:34:26 AM CDT by Noreen Gannon)

## 2022-02-16 NOTE — TELEPHONE ENCOUNTER
---------------------  From: Tarun Nielson MD   To: Sleep Message Pool ([id number] - [email or web address]);     Sent: 2/8/2021 10:11:24 AM CST  Subject: General Message     CPAP compliance reportLeft message for patient to bring chip in for a download.

## 2022-02-16 NOTE — NURSING NOTE
Comprehensive Intake Entered On:  1/20/2020 2:15 PM CST    Performed On:  1/20/2020 2:09 PM CST by Radha Moreno CMA               Summary   Chief Complaint :   c/o post nasal drip, yellow phlegm, chills/sweats, cough more at night x 4 days   Advance Directive :   Yes   Menstrual Status :   N/A   Weight Measured :   250 lb(Converted to: 250 lb 0 oz, 113.40 kg)    Height Measured :   73.5 in(Converted to: 6 ft 1 in, 186.69 cm)    Body Mass Index :   32.53 kg/m2 (HI)    Body Surface Area :   2.42 m2   Systolic Blood Pressure :   126 mmHg   Diastolic Blood Pressure :   84 mmHg (HI)    Mean Arterial Pressure :   98 mmHg   Peripheral Pulse Rate :   78 bpm   BP Site :   Left arm   BP Method :   Manual   HR Method :   Electronic   Temperature Tympanic :   97.7 DegF(Converted to: 36.5 DegC)  (LOW)    Oxygen Saturation :   96 %   Radha Moreno CMA - 1/20/2020 2:09 PM CST   Health Status   Immunizations Current :   Yes   Tobacco Use? :   Never smoker   Radha Moreno CMA - 1/20/2020 2:09 PM CST   Consents   Consent for Immunization Exchange :   Consent Granted   Consent for Immunizations to Providers :   Consent Granted   Radha Moreno CMA - 1/20/2020 2:09 PM CST   Meds / Allergies   (As Of: 1/20/2020 2:15:07 PM CST)   Allergies (Active)   No Known Medication Allergies  Estimated Onset Date:   Unspecified ; Created By:   Daly Amaro CMA; Reaction Status:   Active ; Category:   Drug ; Substance:   No Known Medication Allergies ; Type:   Allergy ; Updated By:   Daly Amaro CMA; Reviewed Date:   1/20/2020 2:11 PM CST        Medication List   (As Of: 1/20/2020 2:15:07 PM CST)   Prescription/Discharge Order    Miscellaneous Rx Supply  :   Miscellaneous Rx Supply ; Status:   Prescribed ; Ordered As Mnemonic:   CPAP machine and supplies ; Simple Display Line:   See Instructions, USE for SANDEE, 1 EA, 0 Refill(s) ; Ordering Provider:   Kiom Benitez PA-C; Catalog Code:   Miscellaneous Rx Supply ; Order Dt/Tm:   3/10/2018  3:25:45 PM CST          rosuvastatin  :   rosuvastatin ; Status:   Prescribed ; Ordered As Mnemonic:   rosuvastatin 10 mg oral tablet ; Simple Display Line:   1 tab(s), Oral, qhs, 90 tab(s), 3 Refill(s) ; Ordering Provider:   Kimo Benitez PA-C; Catalog Code:   rosuvastatin ; Order Dt/Tm:   10/10/2019 3:47:42 PM CDT            Home Meds    acetaminophen  :   acetaminophen ; Status:   Documented ; Ordered As Mnemonic:   Tylenol ; Simple Display Line:   1,000 mg, Oral, q6 hrs, 0 Refill(s) ; Catalog Code:   acetaminophen ; Order Dt/Tm:   1/2/2020 10:17:17 AM CST          aspirin  :   aspirin ; Status:   Documented ; Ordered As Mnemonic:   aspirin 325 mg oral tablet ; Simple Display Line:   325 mg, 1 tab(s), Oral, q4 hrs, 0 Refill(s) ; Catalog Code:   aspirin ; Order Dt/Tm:   1/2/2020 10:16:53 AM CST          celecoxib  :   celecoxib ; Status:   Documented ; Ordered As Mnemonic:   CeleBREX ; Simple Display Line:   Oral, 0 Refill(s) ; Catalog Code:   celecoxib ; Order Dt/Tm:   8/30/2018 10:00:07 AM CDT          ibuprofen  :   ibuprofen ; Status:   Documented ; Ordered As Mnemonic:   IBU ; Simple Display Line:   Oral, 0 Refill(s) ; Catalog Code:   ibuprofen ; Order Dt/Tm:   9/25/2019 10:01:45 AM CDT          oxyCODONE  :   oxyCODONE ; Status:   Documented ; Ordered As Mnemonic:   oxyCODONE ; Simple Display Line:   1 tab, Oral, q4 hrs, 0 Refill(s) ; Catalog Code:   oxyCODONE ; Order Dt/Tm:   1/2/2020 10:17:09 AM CST            Social History   Social History   (As Of: 1/20/2020 2:15:07 PM CST)   Alcohol:  Past      ., 2 drinks/episode average.   (Last Updated: 9/6/2018 8:36:45 AM CDT by Noreen Gannon)          Tobacco:  Past      Current, Snuff   Comments:  4/7/2010 3:25 PM - Halina Eric LPN: one tin per week   (Last Updated: 4/7/2010 3:25:38 PM CDT by Halina Eric LPN)          Substance Abuse:  Denies Substance Abuse      Never   (Last Updated: 9/6/2018 8:36:31 AM CDT by Noreen Gannon)          Employment/School:         Employed, Work/School description: .   (Last Updated: 9/6/2018 8:36:10 AM CDT by Noreen Gannon)          Home/Environment:        Marital status: .  Spouse/Partner name: Sandrita.   (Last Updated: 9/6/2018 8:35:53 AM CDT by Noreen Gannon)          Nutrition/Health:        Type of diet: Regular.   (Last Updated: 9/6/2018 8:37:08 AM CDT by Noreen Gannon)          Exercise:  Regular exercise      (Last Updated: 5/24/2010 3:35:36 PM CDT by Lelo Quiroz )         Other:        .   (Last Updated: 9/6/2018 8:36:57 AM CDT by Noreen Gannon)

## 2022-02-16 NOTE — LETTER
(Inserted Image. Unable to display)   [address] [place]  [address]  [phone]    December 16, 2019      MANDO NEGRO       [address] [place] [id number]        Dear MANDO,    Thank you for selecting [facility] for your healthcare needs. Below you will find the result of your recent test(s) done at our clinic.     This was negative.      Result Name Current Result   Culture Urine  See comment 12/13/2019       Please contact my practice at [phone] if you have any questions or concerns.      Sincerely,        Damian Farley MD ,   Jaylyn Javier MD,   Kimo Benitez PA-C

## 2022-02-16 NOTE — NURSING NOTE
Quick Intake Entered On:  10/14/2021 10:46 AM CDT    Performed On:  10/14/2021 10:46 AM CDT by Kimo Benitez PA-C               Summary   Advance Directive :   Yes   Menstrual Status :   N/A   Height Measured :   73.5 in(Converted to: 6 ft 1 in, 186.69 cm)    Systolic Blood Pressure :   158 mmHg (HI)    Diastolic Blood Pressure :   94 mmHg (HI)    Mean Arterial Pressure :   115 mmHg   BP Site :   Right arm   BP Method :   Manual   Kimo Benitez PA-C - 10/14/2021 10:46 AM CDT

## 2022-02-16 NOTE — NURSING NOTE
Comprehensive Intake Entered On:  12/13/2019 9:57 AM CST    Performed On:  12/13/2019 9:53 AM CST by Gia River MA               Summary   Temperature Tympanic :   97.9 DegF(Converted to: 36.6 DegC)    Gia River MA - 12/13/2019 9:59 AM CST   Chief Complaint :   Pre-op, RFAH, DOS 12/30/19, Dr. Lyles, Knee     Advance Directive :   Yes   Menstrual Status :   N/A   Weight Measured :   245 lb(Converted to: 245 lb 0 oz, 111.13 kg)    Height Measured :   73.5 in(Converted to: 6 ft 1 in, 186.69 cm)    Body Mass Index :   31.88 kg/m2 (HI)    Body Surface Area :   2.4 m2   Systolic Blood Pressure :   146 mmHg (HI)    Diastolic Blood Pressure :   90 mmHg (HI)    Mean Arterial Pressure :   109 mmHg   Peripheral Pulse Rate :   76 bpm   BP Site :   Right arm   BP Method :   Electronic   HR Method :   Electronic   Gia River MA - 12/13/2019 9:53 AM CST   Health Status   Allergies Verified? :   Yes   Medication History Verified? :   Yes   Immunizations Current :   Yes   Medical History Verified? :   Yes   Pre-Visit Planning Status :   Completed   Tobacco Use? :   Never smoker   Gia River MA - 12/13/2019 9:53 AM CST   Consents   Consent for Immunization Exchange :   Consent Granted   Consent for Immunizations to Providers :   Consent Granted   Gia River MA - 12/13/2019 9:53 AM CST   Meds / Allergies   (As Of: 12/13/2019 9:57:08 AM CST)   Allergies (Active)   No Known Medication Allergies  Estimated Onset Date:   Unspecified ; Created By:   Daly Amaro CMA; Reaction Status:   Active ; Category:   Drug ; Substance:   No Known Medication Allergies ; Type:   Allergy ; Updated By:   Daly Amaro CMA; Reviewed Date:   12/13/2019 9:55 AM CST        Medication List   (As Of: 12/13/2019 9:57:08 AM CST)   Prescription/Discharge Order    rosuvastatin  :   rosuvastatin ; Status:   Prescribed ; Ordered As Mnemonic:   rosuvastatin 10 mg oral tablet ; Simple Display Line:   1 tab(s), Oral, qhs, 90  tab(s), 3 Refill(s) ; Ordering Provider:   Kimo Benitez PA-C; Catalog Code:   rosuvastatin ; Order Dt/Tm:   10/10/2019 3:47:42 PM CDT          Miscellaneous Rx Supply  :   Miscellaneous Rx Supply ; Status:   Prescribed ; Ordered As Mnemonic:   CPAP machine and supplies ; Simple Display Line:   See Instructions, USE for SANDEE, 1 EA, 0 Refill(s) ; Ordering Provider:   Kimo Benitez PA-C; Catalog Code:   Miscellaneous Rx Supply ; Order Dt/Tm:   3/10/2018 3:25:45 PM [facility]            Home Meds    tramadol  :   tramadol ; Status:   Documented ; Ordered As Mnemonic:   traMADol 50 mg oral tablet ; Simple Display Line:   0 Refill(s) ; Catalog Code:   traMADol ; Order Dt/Tm:   9/25/2019 11:11:42 AM CDT          ibuprofen  :   ibuprofen ; Status:   Documented ; Ordered As Mnemonic:   IBU ; Simple Display Line:   Oral, 0 Refill(s) ; Catalog Code:   ibuprofen ; Order Dt/Tm:   9/25/2019 10:01:45 AM CDT          celecoxib  :   celecoxib ; Status:   Documented ; Ordered As Mnemonic:   CeleBREX ; Simple Display Line:   Oral, 0 Refill(s) ; Catalog Code:   celecoxib ; Order Dt/Tm:   8/30/2018 10:00:07 AM CDT

## 2022-02-16 NOTE — NURSING NOTE
Comprehensive Intake Entered On:  12/22/2021 10:29 AM CST    Performed On:  12/22/2021 10:25 AM CST by Radha Moreno CMA               Summary   Chief Complaint :   possible sinus infection since this past weekend   Advance Directive :   Yes   Menstrual Status :   N/A   Weight Measured :   256.0 lb(Converted to: 256 lb 0 oz, 116.120 kg)    Height Measured :   73.5 in(Converted to: 6 ft 1 in, 186.69 cm)    Body Mass Index :   33.31 kg/m2 (HI)    Body Surface Area :   2.45 m2   Systolic Blood Pressure :   178 mmHg (HI)    Diastolic Blood Pressure :   108 mmHg (HI)    Mean Arterial Pressure :   131 mmHg   Peripheral Pulse Rate :   86 bpm   BP Site :   Right arm   BP Method :   Electronic   HR Method :   Electronic   Radha Moreno CMA - 12/22/2021 10:25 AM CST   Health Status   Allergies Verified? :   Yes   Medication History Verified? :   Yes   Immunizations Current :   Yes   Medical History Verified? :   Yes   Pre-Visit Planning Status :   Completed   Tobacco Use? :   Never smoker   Radha Moreno CMA - 12/22/2021 10:25 AM CST   Consents   Consent for Immunization Exchange :   Consent Granted   Consent for Immunizations to Providers :   Consent Granted   Radha Moreno CMA - 12/22/2021 10:25 AM CST   Meds / Allergies   (As Of: 12/22/2021 10:29:27 AM CST)   Allergies (Active)   No Known Medication Allergies  Estimated Onset Date:   Unspecified ; Created By:   Daly Amaro CMA; Reaction Status:   Active ; Category:   Drug ; Substance:   No Known Medication Allergies ; Type:   Allergy ; Updated By:   Daly Amaro CMA; Reviewed Date:   12/22/2021 10:27 AM CST        Medication List   (As Of: 12/22/2021 10:29:27 AM CST)   Prescription/Discharge Order    Miscellaneous Rx Supply  :   Miscellaneous Rx Supply ; Status:   Prescribed ; Ordered As Mnemonic:   CPAP machine and supplies ; Simple Display Line:   See Instructions, USE for SANDEE, 1 EA, 0 Refill(s) ; Ordering Provider:   Kimo Benitez PA-C; Catalog Code:    Miscellaneous Rx Supply ; Order Dt/Tm:   3/10/2018 3:25:45 PM CST          rosuvastatin  :   rosuvastatin ; Status:   Prescribed ; Ordered As Mnemonic:   rosuvastatin 10 mg oral tablet ; Simple Display Line:   1 tab(s), Oral, qhs, 90 tab(s), 3 Refill(s) ; Ordering Provider:   Kimo Benitez PA-C; Catalog Code:   rosuvastatin ; Order Dt/Tm:   10/14/2021 10:00:36 AM CDT            Home Meds    ibuprofen  :   ibuprofen ; Status:   Documented ; Ordered As Mnemonic:   ibuprofen ; Simple Display Line:   200 mg, 0 Refill(s) ; Catalog Code:   ibuprofen ; Order Dt/Tm:   2/8/2021 10:00:18 AM CST            Social History   Social History   (As Of: 12/22/2021 10:29:27 AM CST)   Alcohol:        Current, Beer (12 oz), Daily, 2 drinks/episode average.  Ready to change: No.   (Last Updated: 11/19/2021 10:21:11 AM CST by Sharyn Jordan)          Tobacco:        Quit 2016, Snuff, 20 year(s).   (Last Updated: 11/19/2021 10:21:38 AM CST by Sharyn Jordan)          Electronic Cigarette/Vaping:        Electronic Cigarette Use: Never.   (Last Updated: 11/30/2021 11:53:40 AM CST by Francisco Gaston LPN)          Substance Abuse:        Never   (Last Updated: 11/19/2021 10:21:54 AM CST by Sharyn Jordan)          Employment/School:        Employed, Work/School description: Self Employed Farmer.  Highest education level: High school.   (Last Updated: 11/19/2021 10:26:22 AM CST by Sharyn Jordan)          Home/Environment:        Marital status: .  Spouse/Partner name: Sandrita.  3 children.  Living situation: Home/Independent.  Injuries/Abuse/Neglect in household: No.  Feels unsafe at home: No.  Family/Friends available for support: Yes.   (Last Updated: 11/19/2021 10:26:58 AM CST by Sharyn Jordan)          Nutrition/Health:        Type of diet: Regular.  Wants to lose weight: Yes.  Sleeping concerns: No.  Feels highly stressed: No.   (Last Updated: 11/19/2021 10:27:25 AM CST by Sharyn Jordan)          Exercise:         Exercise frequency: Daily.  Exercise type: Farming.   (Last Updated: 11/19/2021 10:27:41 AM CST by Sharyn Jordan)          Sexual:        Sexually active: Yes.  Identifies as male, Sexual orientation: Straight or heterosexual.  History of STD: No.  Contraceptive Use Details: None.  History of sexual abuse: No.   (Last Updated: 11/19/2021 10:28:16 AM CST by Sharyn Jordan)          Other:        Hearing impairment.   (Last Updated: 4/9/2020 9:34:26 AM CDT by Noreen Gannon)

## 2022-02-16 NOTE — TELEPHONE ENCOUNTER
---------------------  From: Gia River MA (Phone Messages Pool ([id number] [facility] [email or web address])   To: Kimo Benitez PA-C;     Sent: 12/23/2019 8:53:25 AM CST  Subject: Phone Note: Question     PCP:   PAUL      Time of Call:  8:40am       Person Calling:  Reggie  Phone number:   Cell     Returned call at: 8:50    Note:   Patient called stating he had a message from you on Friday to call him back. I looked in his chart and do not see a note from Friday. I did let him know the Urine results were normal.     Pharmacy: n/a    Last office visit and reason:  12/13/19    Transferred to: KAH---------------------  From: Kimo Benitez PA-C   To: Phone Messages Pool ([id number]_[place] [employer]);     Sent: 12/23/2019 3:04:13 PM CST  Subject: RE: Phone Note: Question     Can you see if he has the appt. scheduled with Dr. Hooper?    PAUL---------------------  From: Gia River MA (Phone Messages Pool ([id number]_[email or web address]))   To: Kimo Benitez PA-C;     Sent: 12/23/2019 3:11:20 PM CST  Subject: RE: Phone Note: Question     No he has not gotten that scheduled.

## 2022-02-16 NOTE — LETTER
(Inserted Image. Unable to display)   [address] [place]  [address]  [phone]    September 26, 2019      MANDOMIRA NEGRO       [address] [facility] [address] [place], [place] [id number]        Dear MANDO,    Thank you for selecting [facility] for your healthcare needs. Below you will find the result of your recent test(s) done at our clinic.     These are fine. See you in one year.      Result Name Current Result Previous Result Reference Range   Sodium Level (mmol/L)  141 9/25/2019  141 8/31/2018 135 - 146   Potassium Level (mmol/L)  4.6 9/25/2019  4.9 8/31/2018 3.5 - 5.3   Chloride Level (mmol/L)  106 9/25/2019  105 8/31/2018 98 - 110   CO2 Level (mmol/L)  26 9/25/2019  28 8/31/2018 20 - 32   Glucose Level (mg/dL)  95 9/25/2019 ((H)) 101 8/31/2018 65 - 99   BUN (mg/dL)  23 9/25/2019  20 8/31/2018 7 - 25   Creatinine Level (mg/dL)  0.95 9/25/2019  1.06 8/31/2018 0.70 - 1.25   Calcium Level (mg/dL)  9.8 9/25/2019  9.6 8/31/2018 8.6 - 10.3   Cholesterol (mg/dL)  196 9/25/2019  167 12/26/2018  - <200   Non-HDL Cholesterol ((H)) 132 9/25/2019  110 12/26/2018  - <130   HDL (mg/dL)  64 9/25/2019  57 12/26/2018 >40 -    Cholesterol/HDL Ratio  3.1 9/25/2019  2.9 12/26/2018  - <5.0   LDL ((H)) 111 9/25/2019  95 12/26/2018    Triglyceride (mg/dL)  105 9/25/2019  68 12/26/2018  - <150   PSA (ng/mL)  2.5 9/25/2019  1.9 8/31/2018  - < OR = 4.0       Please contact my practice at [phone] if you have any questions or concerns.      Sincerely,        Damian Farley MD ,   Jaylyn Javier MD,   Kimo Benitez PA-C

## 2022-02-16 NOTE — LETTER
(Inserted Image. Unable to display)     February 23, 2021      MANDO NEGRO   [address] [place], [place] [phone]          Dear MANDO,      Thank you for selecting [facility] (previously [facility]) for your healthcare needs.    Our records indicate you are due for the following services:     Clinical Support Staff (CSS)-Only Blood Pressure Check ~ Please stop in anytime to have your blood pressure rechecked. This is a free service and no appointment necessary.     So we can best determine if your medications are effective in lowering your blood pressure, please make sure your blood pressure medicine has been in your system for at least 1-2 hours prior to coming in.  We encourage you to avoid caffeine or other stimulants prior to having your blood pressure checked and come at a time when you are not feeling rushed.     If you check your blood pressure at home, please bring in your blood pressure monitor and home blood pressure readings.  We will check your machine for accuracy and also share your home readings with your Healthcare Provider.     (FYI   Regarding office visits: In some instances, a video visit or telephone visit may be offered as an option.)      To schedule an appointment or if you have further questions, please contact your clinic at [phone].      Powered by [employer] and [employer]    Sincerely,    Tarun Nielson MD, FACP

## 2022-02-16 NOTE — TELEPHONE ENCOUNTER
---------------------  From: Kimo Benitez PA-C   To: [facility] ([address] [place] [address]);     Sent: 12/27/2019 3:07:43 PM CST  Subject: General Message     Did he get his appt with Dr. Panda scheduled?    KAH---------------------  From: Jennifer Zhao ([facility] ([address] [place] [address]))   To: Kimo Benitez PA-C;     Sent: 12/30/2019 8:35:31 AM CST  Subject: RE: General Message     Will check and send you an update.---------------------  From: Jennifer Zhao ([facility] ([address] [place] [address]))   To: Kimo Benitez PA-C;     Sent: 12/30/2019 3:36:10 PM CST  Subject: RE: General Message     I reached a  who told me Dr. Panda and his assistant are out today but that Dr. Panda is scheduling out a year for routine visits and a couple months for urgent - when I originally sent the referral I called and was told to fax to his assistant directly to see if could be worked in but did not hear back and was not able to confirm today since they are gone other than he is not scheduled at this time other than with Dr. Lyles.    Would you like patient to see a different provider?---------------------  From: Kimo Benitez PA-C   To: [facility] ([address] [place] [address]);     Sent: 12/30/2019 3:39:42 PM CST  Subject: RE: General Message     No he really needs to see Dr. Panda. I would think his condition would warrant an urgent referral. Thanks for reaching out.    PAUL---------------------  From: Jennifer Zhao ([facility] ([address] [place] [address]))   To: Kimo Benitez PA-C;     Sent: 12/31/2019 11:10:21 AM CST  Subject: RE: General Message     Reggie is scheduled to see Dr. Panda on Jan 14th at 3:45pm.---------------------  From: Kimo Benitez PA-C   To: [facility]erral [facility]ordin[facility] ([id number]);     Sent: 12/31/2019 11:21:19 AM CST  Subject: RE: General Message     Great, thanks so much.

## 2022-02-16 NOTE — LETTER
(Inserted Image. Unable to display)           [address] [place] [address]  [phone]    October 15, 2021      MANDO NEGRO       [address] [place] [phone]        Dear MANDO,    Thank you for selecting [facility] for your healthcare needs. Below you will find the result of your recent test(s) done at our clinic.     Results all look fine. See you next year.      Result Name Current Result Previous Result Reference Range   PSA (ng/mL)  2.73 10/14/2021  2.0 10/12/2020  - < OR = 4.00   Cholesterol (mg/dL)  183 10/14/2021 ((H)) 200 10/12/2020  - <200   HDL (mg/dL)  71 10/14/2021  68 10/12/2020 > OR = 40 -    Triglyceride (mg/dL)  96 10/14/2021  92 10/12/2020  - <150   LDL  93 10/14/2021 ((H)) 112 10/12/2020    Cholesterol/HDL Ratio  2.6 10/14/2021  2.9 10/12/2020  - <5.0   Non-HDL Cholesterol  112 10/14/2021 ((H)) 132 10/12/2020  - <130   Glucose Level (mg/dL)  98 10/14/2021  94 10/12/2020 65 - 99   BUN (mg/dL)  20 10/14/2021  22 10/12/2020 7 - 25   Creatinine Level (mg/dL)  0.94 10/14/2021  0.97 10/12/2020 0.70 - 1.25   eGFR (mL/min/1.73m2)  87 10/14/2021  84 10/12/2020 > OR = 60 -    eGFR  (mL/min/1.73m2)  100 10/14/2021  97 10/12/2020 > OR = 60 -    Chloride Level (mmol/L)  106 10/14/2021  103 10/12/2020 98 - 110   CO2 Level (mmol/L)  26 10/14/2021 ((H)) 33 10/12/2020 20 - 32   Calcium Level (mg/dL)  9.4 10/14/2021  9.5 10/12/2020 8.6 - 10.3       Please contact my practice at [phone] if you have any questions or concerns.      Sincerely,        Damian Farley MD ,   Jaylyn Javier MD,   Kimo Benitez PA-C                  What do you labs mean?  Below is a glossary of commonly ordered labs:  LDL - Bad Cholesterol HDL - Good Cholesterol  AST/ALT - Liver Function Cr/creatinine - Kidney Function  Microalbumin - Kidney Function  TSH - Thyroid Function PSA- Prostate  Hgb - iron stores/blood count Hgb A1c - Diabetic control

## 2022-02-16 NOTE — LETTER
(Inserted Image. Unable to display)   [address] [place]  [address]  [phone]    October 13, 2020      MANDO NEGRO       [address] [place] [phone]        Dear MANDO,    Thank you for selecting [facility] for your healthcare needs. Below you will find the result of your recent test(s) done at our clinic.     Results are stable. See you in one year. I hope your surgery goes well.      Result Name Current Result Previous Result Reference Range   Sodium Level (mmol/L)  140 10/12/2020  141 9/25/2019 135 - 146   Potassium Level (mmol/L)  4.8 10/12/2020  4.6 9/25/2019 3.5 - 5.3   Chloride Level (mmol/L)  103 10/12/2020  106 9/25/2019 98 - 110   Glucose Level (mg/dL)  94 10/12/2020  95 9/25/2019 65 - 99   BUN (mg/dL)  22 10/12/2020  23 9/25/2019 7 - 25   Creatinine Level (mg/dL)  0.97 10/12/2020  0.95 9/25/2019 0.70 - 1.25   Calcium Level (mg/dL)  9.5 10/12/2020  9.8 9/25/2019 8.6 - 10.3   Cholesterol (mg/dL) ((H)) 200 10/12/2020  196 9/25/2019  - <200   Non-HDL Cholesterol ((H)) 132 10/12/2020 ((H)) 132 9/25/2019  - <130   HDL (mg/dL)  68 10/12/2020  64 9/25/2019 > OR = 40 -    Cholesterol/HDL Ratio  2.9 10/12/2020  3.1 9/25/2019  - <5.0   LDL ((H)) 112 10/12/2020 ((H)) 111 9/25/2019    Triglyceride (mg/dL)  92 10/12/2020  105 9/25/2019  - <150   PSA (ng/mL)  2.0 10/12/2020  2.5 9/25/2019  - < OR = 4.0   WBC  6.0 10/12/2020  3.8 - 10.8   RBC  4.74 10/12/2020  4.20 - 5.80   Hgb (gm/dL)  14.8 10/12/2020  13.2 - 17.1   Hct (%)  43.3 10/12/2020  38.5 - 50.0   MCV (fL)  91.4 10/12/2020  80.0 - 100.0   MCH (pg)  31.2 10/12/2020  27.0 - 33.0   MCHC (gm/dL)  34.2 10/12/2020  32.0 - 36.0   RDW (%)  12.4 10/12/2020  11.0 - 15.0   Platelet  321 10/12/2020  140 - 400   MPV (fL)  9.7 10/12/2020  7.5 - 12.5       Please contact my practice at [phone] if you have any questions or concerns.      Sincerely,        Damian Farley MD ,   Jaylyn Javier MD,   Kimo Benitez  CINDY

## 2022-02-16 NOTE — NURSING NOTE
Depression Screening Entered On:  10/12/2020 2:35 PM CDT    Performed On:  10/12/2020 2:35 PM CDT by Radha Moreno CMA               Depression Screening   Little Interest - Pleasure in Activities :   Not at all   Feeling Down, Depressed, Hopeless :   Not at all   Initial Depression Screen Score :   0 Score   Poor Appetite or Overeating :   Not at all   Trouble Falling or Staying Asleep :   Not at all   Feeling Tired or Little Energy :   Several days   Feeling Bad About Yourself :   Not at all   Trouble Concentrating :   Not at all   Moving or Speaking Slowly :   Not at all   Thoughts Better Off Dead or Hurting Self :   Not at all   TABATHA Difficulty with Work, Home, Others :   Not difficult at all   Detailed Depression Screen Score :   1    Total Depression Screen Score :   1    Radha Moreno CMA - 10/12/2020 2:35 PM CDT

## 2022-02-16 NOTE — NURSING NOTE
Comprehensive Intake Entered On:  2/20/2020 9:52 AM CST    Performed On:  2/20/2020 9:48 AM CST by Amanda Bennett CMA               Summary   Chief Complaint :   Possible sinus infection; cough; nasal drainage; headaches;  x3 weeks   Advance Directive :   Yes   Menstrual Status :   N/A   Weight Measured :   247 lb(Converted to: 247 lb 0 oz, 112.04 kg)    Height Measured :   73.5 in(Converted to: 6 ft 1 in, 186.69 cm)    Body Mass Index :   32.14 kg/m2 (HI)    Body Surface Area :   2.41 m2   Systolic Blood Pressure :   126 mmHg   Diastolic Blood Pressure :   80 mmHg   Mean Arterial Pressure :   95 mmHg   Peripheral Pulse Rate :   80 bpm   BP Method :   Manual   HR Method :   Electronic   Oxygen Saturation :   93 % (LOW)    Amanda Bennett CMA - 2/20/2020 9:48 AM CST   Health Status   Allergies Verified? :   Yes   Medication History Verified? :   Yes   Immunizations Current :   Yes   Medical History Verified? :   Yes   Pre-Visit Planning Status :   Completed   Tobacco Use? :   Never smoker   Amanda Bennett CMA - 2/20/2020 9:48 AM CST   Consents   Consent for Immunization Exchange :   Consent Granted   Consent for Immunizations to Providers :   Consent Granted   Amanda Bennett CMA - 2/20/2020 9:48 AM CST   Meds / Allergies   (As Of: 2/20/2020 9:52:32 AM CST)   Allergies (Active)   No Known Medication Allergies  Estimated Onset Date:   Unspecified ; Created By:   Daly Amaro CMA; Reaction Status:   Active ; Category:   Drug ; Substance:   No Known Medication Allergies ; Type:   Allergy ; Updated By:   Daly Amaro CMA; Reviewed Date:   2/20/2020 9:51 AM CST        Medication List   (As Of: 2/20/2020 9:52:32 AM CST)   Prescription/Discharge Order    Miscellaneous Rx Supply  :   Miscellaneous Rx Supply ; Status:   Prescribed ; Ordered As Mnemonic:   CPAP machine and supplies ; Simple Display Line:   See Instructions, USE for SANDEE, 1 EA, 0 Refill(s) ; Ordering Provider:   Kimo Benitez PA-C; Catalog Code:    Miscellaneous Rx Supply ; Order Dt/Tm:   3/10/2018 3:25:45 PM CST          rosuvastatin  :   rosuvastatin ; Status:   Prescribed ; Ordered As Mnemonic:   rosuvastatin 10 mg oral tablet ; Simple Display Line:   1 tab(s), Oral, qhs, 90 tab(s), 3 Refill(s) ; Ordering Provider:   Kimo Benitez PA-C; Catalog Code:   rosuvastatin ; Order Dt/Tm:   10/10/2019 3:47:42 PM CDT            Home Meds    aspirin  :   aspirin ; Status:   Documented ; Ordered As Mnemonic:   aspirin 325 mg oral tablet ; Simple Display Line:   325 mg, 1 tab(s), Oral, q4 hrs, 0 Refill(s) ; Catalog Code:   aspirin ; Order Dt/Tm:   1/2/2020 10:16:53 AM CST          celecoxib  :   celecoxib ; Status:   Documented ; Ordered As Mnemonic:   CeleBREX ; Simple Display Line:   Oral, 0 Refill(s) ; Catalog Code:   celecoxib ; Order Dt/Tm:   8/30/2018 10:00:07 AM CDT          ibuprofen  :   ibuprofen ; Status:   Documented ; Ordered As Mnemonic:   IBU ; Simple Display Line:   Oral, 0 Refill(s) ; Catalog Code:   ibuprofen ; Order Dt/Tm:   9/25/2019 10:01:45 AM CDT          traMADol  :   traMADol ; Status:   Documented ; Ordered As Mnemonic:   traMADol ; Simple Display Line:   Oral, 0 Refill(s) ; Catalog Code:   traMADol ; Order Dt/Tm:   1/20/2020 2:15:35 PM CST

## 2022-02-16 NOTE — PROGRESS NOTES
Patient:   MANDO NEGRO            MRN: [id number]            FIN: [id number]               Age:   59 years     Sex:  Male     :  1960   Associated Diagnoses:   Acute recurrent maxillary sinusitis   Author:   Kimo Benitez PA-C      Report Summary   Diagnosis  Acute recurrent maxillary sinusitis ([id number] J01.01).  Patient InstructionsOrders   Visit Information      Date of Service: 2020 02:01 pm  Performing Location: [facility]  Encounter#: [id number]      Primary Care Provider (PCP):  Kimo Benitez PA-C    NPI# [id number]      Referring Provider:  Kimo Benitez PA-C    NPI# [id number]   Visit type:  Increase in symptoms.    Accompanied by:  No one.    Source of history:  Self.    Referral source:  Self.    History limitation:  None.       Chief Complaint   2020 2:09 PM CST    c/o post nasal drip, yellow phlegm, chills/sweats, cough more at night x 4 days        History of Present Illness             The patient presents with sinus problem.  The sinus problem is located in the maxillary sinus.  The sinus problem is characterized by nasal congestion, rhinorrhea and facial pain.  The severity of the sinus problem is moderate.  The sinus problem fluctuates in intensity and is worsening.  The sinus problem has lasted for 2 week(s).  The context of the sinus problem: occurred in association with illness.  Recurrent sinusitis since facial trauma and reconstruction a number of years ago.  CC above noted and confirmed with the patient.sinus pressure. Purulent nasal discharge and post nasal drip..  Wants to have his Mobic refilled. Working well. .        Review of Systems   Constitutional:  Negative except as documented in history of present illness.    Eye:  Negative.    Ear/Nose/Mouth/Throat:  Negative except as documented in history of present illness.    Respiratory:  Cough, Snoring.    Cardiovascular:  Negative.    Gastrointestinal:  Negative.       Health Status   Allergies:     Allergic Reactions (Selected)  No Known Medication Allergies   Medications:  (Selected)   Prescriptions  Prescribed  CPAP machine and supplies: CPAP machine and supplies, See Instructions, Instructions: USE for SANDEE, Supply, # 1 EA, 0 Refill(s), Type: Maintenance  amoxicillin-clavulanate 875 mg-125 mg oral tablet: = 1 tab(s), Oral, q12 hrs, x 10 day(s), # 20 tab(s), 0 Refill(s), Type: Acute, Pharmacy: [facility], 1 tab(s) Oral q12 hrs,x10 day(s)  rosuvastatin 10 mg oral tablet: = 1 tab(s), Oral, qhs, # 90 tab(s), 3 Refill(s), Type: Maintenance, Pharmacy: [facility]  Documented Medications  Documented  CeleBREX: Oral, 0 Refill(s), Type: Maintenance  IBU: Oral, 0 Refill(s), Type: Maintenance  aspirin 325 mg oral tablet: = 1 tab(s) ( 325 mg ), Oral, q4 hrs, 0 Refill(s), Type: Maintenance  traMADol: Oral, 0 Refill(s), Type: Maintenance,    Medications          *denotes recorded medication          CPAP machine and supplies: See Instructions, USE for SANDEE, 1 EA, 0 Refill(s).          amoxicillin-clavulanate 875 mg-125 mg oral tablet: 1 tab(s), Oral, q12 hrs, for 10 day(s), 20 tab(s), 0 Refill(s).          *aspirin 325 mg oral tablet: 325 mg, 1 tab(s), Oral, q4 hrs, 0 Refill(s).          *CeleBREX: Oral, 0 Refill(s).          *IBU: Oral, 0 Refill(s).          rosuvastatin 10 mg oral tablet: 1 tab(s), Oral, qhs, 90 tab(s), 3 Refill(s).          *traMADol: Oral, 0 Refill(s).       Problem list:    All Problems  Tobacco use / SNOMED CT 986668890 / Confirmed  Obstructive sleep apnea / SNOMED CT 581625434 / Confirmed  Obesity, unspecified / SNOMED CT 8691129455 / Probable  Chronic pain of left ankle / SNOMED CT 9467969795 / Confirmed  Inactive: CTS - Carpal tunnel syndrome / SNOMED CT 763120514      Histories   Past Medical History:    Active  Obstructive sleep apnea (160710756): Onset on 3/1/2011 at 50 years.  Comments:  3/17/2011 CDT 9:38 AM CDT - Tarun Nielson MD  AHI 13.2, RDI 20.9, CPAP 8  Tobacco use  (879617139)  Comments:  2011 CST 10:14 AM Jaylyn Valero  Oral tobacco  Obesity, unspecified (8472914853)  Chronic pain of left ankle (0969883855)   Family History:    Cancer  Father ()  Comments:  2011 10:18 AM Jaylyn Valero  CA of throat  Breast cancer  Mother ()     Procedure history:    Arthroplasty of knee (35360886) on 2019 at 59 Years.  Comments:  2020 1:34 PM Noreen Boogie  Unicompartmental right knee.  Arthrodesis of ankle (045139726) on 2017 at 57 Years.  Comments:  2017 2:44 PM Noreen Rocha  Left ankle and bone grafting.  Hardware removal x 1 screw.  Left ankle surgery on 2016 at 56 Years.  Comments:  2016 9:34 AM Noreen Rocha  Open debridement with loose body removal.   Subtalar corrective fusion with platelet derived growth factor augment graft.   !st metatarsal dorsiflexioin osteotomy.  Lateral ligament reconstruction.  Peroneus longus debridement and tenodesis of brevis.  Colonoscopy (025853680) on 2011 at 50 Years.  Comments:  2011 7:41 PM Stacy Lu MA  Normal colonoscopy, to come back in 10 years, and use propofol for next colonoscopy.  Reconstruction of facial bones (546650066) in  at 38 Years.  Comments:  2011 10:16 AM Jaylyn Martinez  Status post trauma  Tonsillectomy and adenoidectomy (928561458) in 1963 at 3 Years.   Social History:        Alcohol Assessment: Past            ., 2 drinks/episode average.      Tobacco Assessment: Past            Current, Snuff                     Comments:                      2010 - Halina Eric LPN                     one tin per week      Substance Abuse Assessment: Denies Substance Abuse            Never      Employment and Education Assessment            Employed, Work/School description: .      Home and Environment Assessment            Marital status: .  Spouse/Partner name: Sandrita.      Nutrition and Health  Assessment            Type of diet: Regular.      Exercise and Physical Activity Assessment: Regular exercise      Other Assessment            .        Physical Examination   Vital Signs   1/20/2020 2:09 PM CST Temperature Tympanic 97.7 DegF  LOW    Peripheral Pulse Rate 78 bpm    HR Method Electronic    Systolic Blood Pressure 126 mmHg    Diastolic Blood Pressure 84 mmHg  HI    Mean Arterial Pressure 98 mmHg    BP Site Left arm    BP Method Manual    Oxygen Saturation 96 %      Measurements from flowsheet : Measurements   1/20/2020 2:09 PM CST Height Measured - Standard 73.5 in    Weight Measured - Standard 250 lb    BSA 2.42 m2    Body Mass Index 32.53 kg/m2  HI      General:  Alert and oriented, No acute distress.    Eye:  Pupils are equal, round and reactive to light, Extraocular movements are intact, Normal conjunctiva.    HENT:  Normocephalic, Oral mucosa is moist, Sinus tenderness to percussion.         Nose: Both nostrils, Discharge ( Moderate amount, Green ).    Neck:  Supple, Non-tender.    Respiratory:  Lungs are clear to auscultation, Respirations are non-labored, Breath sounds are equal.    Cardiovascular:  Normal rate, Regular rhythm, No murmur.    Psychiatric:  Cooperative, Appropriate mood & affect.       Impression and Plan   Diagnosis     Acute recurrent maxillary sinusitis (FCR97-AC J01.01).     Patient Instructions:       Counseled: Patient, Regarding diagnosis, Regarding medications, Activity, Verbalized understanding.    Orders     Orders (Selected)   Prescriptions  Prescribed  amoxicillin-clavulanate 875 mg-125 mg oral tablet: = 1 tab(s), Oral, q12 hrs, x 10 day(s), # 20 tab(s), 0 Refill(s), Type: Acute, Pharmacy: [facility], 1 tab(s) Oral q12 hrs,x10 day(s).     Take medicine as prescribed, side effects discussed.  Tylenol/ibuprofen for fever and discomfort.  Push fluids.  RTC if not improving in 36-48 hours, prior if concerns as we have discussed.

## 2022-02-16 NOTE — PROGRESS NOTES
Patient:   MANDO NEGRO            MRN: [id number]            FIN: [id number]               Age:   61 years     Sex:  Male     :  1960   Associated Diagnoses:   Annual physical exam; Obesity, unspecified; Dyslipidemia; Obstructive sleep apnea; Chronic pain of left ankle; Screening for prostate cancer; Encounter for vaccination   Author:   Kimo Benitez PA-C      Visit Information      Date of Service: 10/14/2021 09:44 am  Performing Location: [facility]  Encounter#: [id number]      Primary Care Provider (PCP):  Kimo Benitez PA-C    NPI# [id number]      Referring Provider:  Kimo Benitez PA-C# [id number]   Visit type:  Annual exam.    Accompanied by:  No one.    Source of history:  Medical record.    Referral source:  Self.    History limitation:  None.       Chief Complaint   10/14/2021 9:51 AM CDT   Physical, fasting for blood work, flu shot     Chronic left ankle pain. OA. Ankle and knee feel much improved.      Well Adult History   Well Adult History             The patient presents for well adult exam.  The patient's general health status is described as good.  The patient's diet is described as balanced.  Exercise: routine.  Complaint:.  Additional pertinent history: daily caffeine use, tobacco use none and alcohol use socially.     Check right ankle. Injured when attacked by bull over one year ago. Did not see anyone at time of injury. No other concerns.      Review of Systems   Constitutional:  Negative.    Eye:  Negative.    Ear/Nose/Mouth/Throat:  Negative.    Respiratory:  Negative.    Cardiovascular:  Negative.    Gastrointestinal:  Negative.    Genitourinary:  Negative.    Hematology/Lymphatics:  Negative.    Endocrine:  Negative.    Immunologic:  Negative.    Musculoskeletal:  Negative except as documented in history of present illness.    Integumentary:  Negative.    Neurologic:  Negative.    Psychiatric:  Negative.    All other systems reviewed and negative       Health Status   Allergies:    Allergic Reactions (Selected)  No Known Medication Allergies   Medications:  (Selected)   Prescriptions  Prescribed  CPAP machine and supplies: CPAP machine and supplies, See Instructions, Instructions: USE for SANDEE, Supply, # 1 EA, 0 Refill(s), Type: Maintenance  rosuvastatin 10 mg oral tablet: = 1 tab(s), Oral, qhs, # 90 tab(s), 3 Refill(s), Type: Maintenance, Pharmacy: [facility], 1 tab(s) Oral qhs, 73.5, in, 10/14/21 9:51:00 CDT, Height Measured, 257.1, lb, 10/14/21 9:51:00 CDT, Weight Measured  Documented Medications  Documented  ibuprofen: ( 200 mg ), 0 Refill(s), Type: Maintenance   Problem list:    All Problems (Selected)  Obstructive sleep apnea / SNOMED CT 280984470 / Confirmed  Obesity, unspecified / SNOMED CT 4938596590 / Probable  Dyslipidemia / SNOMED CT 2624326028 / Confirmed  Chronic pain of left ankle / SNOMED CT 2910651181 / Confirmed      Histories   Past Medical History:    Active  Obstructive sleep apnea (498698511): Onset on 3/1/2011 at 50 years.  Comments:  3/17/2011 CDT 9:38 AM CDT - Tarun Nielson MD  AHI 13.2, RDI 20.9, CPAP 8  Obesity, unspecified (3839452147)  Chronic pain of left ankle (6942868815)  Resolved  Tobacco use (826567983):  Resolved.  Comments:  2011 CST 10:14 AM CST - Jaylyn Mendieta  Oral tobacco   Family History:    Cancer  Father ()  Comments:  2011 10:18 AM CST - Jaylyn Mendieta  CA of throat  Breast cancer  Mother ()     Procedure history:    Screening for malignant neoplasm of colon (6101518641) on 2021 at 60 Years.  Comments:  2021 8:21 AM CST - Karen Saucedo - Negative  Arthroplasty of knee (14354770) on 2019 at 59 Years.  Comments:  2020 1:34 PM CST - Noreen Gannon  Unicompartmental right knee.  Arthrodesis of ankle (557206510) on 2017 at 57 Years.  Comments:  2017 2:44 PM CDT - Noreen Gannon  Left ankle and bone grafting.  Hardware removal x 1 screw.  Left ankle  surgery on 7/1/2016 at 56 Years.  Comments:  8/16/2016 9:34 AM CDT - Jagdeep Noreen  Open debridement with loose body removal.   Subtalar corrective fusion with platelet derived growth factor augment graft.   !st metatarsal dorsiflexioin osteotomy.  Lateral ligament reconstruction.  Peroneus longus debridement and tenodesis of brevis.  Colonoscopy (539730056) on 1/20/2011 at 50 Years.  Comments:  1/24/2011 7:41 PM CST - Stacy Peña MA  Normal colonoscopy, to come back in 10 years, and use propofol for next colonoscopy.  Reconstruction of facial bones (883439074) in 1998 at 38 Years.  Comments:  1/7/2011 10:16 AM CST - Jaylyn Sanchez  Status post trauma  Tonsillectomy and adenoidectomy (448377747) in 1963 at 3 Years.   Social History:        Electronic Cigarette/Vaping Assessment            Electronic Cigarette Use: Never.      Alcohol Assessment            Current, Beer (12 oz), Daily, 2 drinks/episode average.  Ready to change: No.      Tobacco Assessment            Quit 2016, Snuff, 20 year(s).      Substance Abuse Assessment            Never      Employment and Education Assessment            Employed, Work/School description: Self Employed Farmer.  Highest education level: High school.      Home and Environment Assessment            Marital status: .  Spouse/Partner name: Frank Kaiser children.  Living situation: Home/Independent.               Injuries/Abuse/Neglect in household: No.  Feels unsafe at home: No.  Family/Friends available for support:               Yes.      Nutrition and Health Assessment            Type of diet: Regular.  Sleeping concerns: No.  Feels highly stressed: No.      Exercise and Physical Activity Assessment            Exercise frequency: Daily.  Exercise type: Farming.      Sexual Assessment            Sexually active: Yes.  Identifies as male, Sexual orientation: Straight or heterosexual.  History of STD: No.               Contraceptive Use Details: None.  History of sexual  abuse: No.      Other Assessment            Hearing impairment.        Physical Examination   Vital Signs   10/14/2021 9:51 AM CDT Peripheral Pulse Rate 69 bpm    HR Method Electronic    Systolic Blood Pressure 158 mmHg  HI    Diastolic Blood Pressure 106 mmHg  HI    Mean Arterial Pressure 123 mmHg    BP Site Right arm    BP Method Electronic      Measurements from flowsheet : Measurements   10/14/2021 9:51 AM CDT Height Measured - Standard 73.5 in    Weight Measured - Standard 257.1 lb    BSA 2.46 m2    Body Mass Index 33.46 kg/m2  HI      Documented vital signs:       Blood Pressure: Systolic  158  mmHg, Diastolic  94  mmHg, Sitting.    General:  No acute distress.    Eye:  Pupils are equal, round and reactive to light, Extraocular movements are intact, Normal conjunctiva.    HENT:  Normocephalic, Tympanic membranes are clear, Oral mucosa is moist, No pharyngeal erythema.    Neck:  Supple, Non-tender, No lymphadenopathy, No thyromegaly.    Respiratory:  Lungs are clear to auscultation, Respirations are non-labored, Breath sounds are equal.    Cardiovascular:  Normal rate, Regular rhythm, No murmur, Good pulses equal in all extremities, Normal peripheral perfusion.         Edema: Left, Leg, Ankle.    Gastrointestinal:  Soft, Non-tender, Non-distended, Normal bowel sounds, No organomegaly.    Genitourinary:  No costovertebral angle tenderness.    Musculoskeletal:  No deformity.    Integumentary:  No rash.    Neurologic:  No focal deficits.    Psychiatric:  Cooperative, Appropriate mood & affect.       Health Maintenance      Recommendations     Pending (in the next year)        Due            Lipid Disorders Screen due  10/12/21  and every 1  year(s)           Alcohol Misuse Screen due  10/12/21  and every 1  year(s)           Depression Screen due  10/12/21  and every 1  year(s)           COVID-19 Vaccine (Moderna) Dose 1 due  10/14/21  One-time only           Hepatitis C Screen [id number] due  10/14/21  One-time  only           Lung Cancer Screen due  10/14/21  and every 1  year(s)           Type 2 Diabetes Mellitus Screen due  10/14/21  Variable frequency     Satisfied (in the past 1 year)        Satisfied            Body Mass Index Check on  10/14/21.           Body Mass Index Check on  02/08/21.           Colorectal Cancer Screen (Sigmoidoscopy) on  02/17/21.           Colorectal Cancer Screen (Colonoscopy) on  02/17/21.           Colorectal Cancer Screen (Occult Blood) on  02/17/21.           High Blood Pressure Screen on  10/14/21.           High Blood Pressure Screen on  02/08/21.           High Blood Pressure Screen on  02/08/21.           Influenza Vaccine on  10/14/21.           Influenza Vaccine on  10/14/21.           Obesity Screen and Counseling on  10/14/21.           Obesity Screen and Counseling on  02/08/21.           Tobacco Use Screen on  10/14/21.           Tobacco Use Screen on  02/08/21.          Review / Management   ECG interpretation:  Past EKG reviewed.       Impression and Plan   Diagnosis     Annual physical exam (SOH49-FP Z00.00).     Obesity, unspecified (YEW40-XY E66.9).     Dyslipidemia (ULH96-EV E78.5).     Obstructive sleep apnea (GYY30-XO G47.33).     Chronic pain of left ankle (EMZ22-JS M25.572).     Screening for prostate cancer (OHJ89-TF Z12.5).     Encounter for vaccination (LQF76-MA Z23).     Patient Instructions:       Counseled: Patient, Regarding diagnosis, Regarding medications, Verbalized understanding.    Orders     Orders (Selected)   Outpatient Orders  Ordered  Return to Clinic (Request): RFV: Px and fasting labs, Return in 1 year  Ordered (In Transit)  Basic Metabolic Panel* ([facility]): Specimen Type: Serum, Collection Date: 10/14/21 9:59:00 CDT  Lipid panel with reflex to direct ldl* ([facility]): Specimen Type: Serum, Collection Date: 10/14/21 9:59:00 CDT  PSA, Total (Room)* ([facility]): Specimen Type: Serum, Collection Date: 10/14/21 9:59:00 CDT  Completed  Flublok Quadrivalent  [id number]: 0.5 mL, IM, once  Prescriptions  Prescribed  rosuvastatin 10 mg oral tablet: = 1 tab(s), Oral, qhs, # 90 tab(s), 3 Refill(s), Type: Maintenance, Pharmacy: [facility], 1 tab(s) Oral qhs, 73.5, in, 10/14/21 9:51:00 CDT, Height Measured, 257.1, lb, 10/14/21 9:51:00 CDT, Weight Measured.     Summary:  Get home blood pressure cuff. Call in one month with readings. If consistently elevated, will consider treatment..

## 2022-02-16 NOTE — PROGRESS NOTES
Patient:   MANDO NEGRO            MRN: [id number]            FIN: [id number]               Age:   57 years     Sex:  Male     :  1960   Associated Diagnoses:   Osteoarthritis of left ankle   Author:   Kimo Benitez PA-C      Visit Information   Visit type:  General concerns.    Accompanied by:  No one.    Source of history:  Self, Medical record.    Referral source:  Self.    History limitation:  None.       Chief Complaint   2017 9:12 AM CDT    c/o L ankle pain, cramping; had fusion last year        History of Present Illness             The patient presents with pain and injury.  The symptom(s) is described as pain and swelling.  The location of symptom(s) is the left.  The severity of the symptom(s) is moderate.  The timing/course of symptom(s) is constant.  The context of the symptom(s): occurred after trauma.  History of bilat ankle pain. Has had partial fusion and tender repair of left ankle. Apparently fused wrong joint. Pending further surgery. He wants to wait until September if possible. Has some leg cramping. . Has OA of both ankles. Did see his surgeon. Suggested he get on NSAID on a regular basis. CC above noted and confirmed with the patient..        Review of Systems   Constitutional:  Negative.    Musculoskeletal:  Negative except as documented in history of present illness.       Health Status   Allergies:    Allergic Reactions (All)  No Known Medication Allergies  Canceled/Inactive Reactions (All)  No known allergies   Medications:  (Selected)   Prescriptions  Prescribed  Flexeril 10 mg oral tablet: 1 tab(s) ( 10 mg ), PO, TID, PRN: for spasm, # 30 tab(s), 1 Refill(s), Type: Maintenance, Pharmacy: [id number], 1 tab(s) po tid,PRN:for spasm  piroxicam 20 mg oral capsule: 1 cap(s) ( 20 mg ), PO, Daily, # 30 cap(s), 3 Refill(s), Type: Maintenance, Pharmacy: [id number], 1 cap(s) po daily  Documented Medications  Documented  acetaminophen: prn, 0 Refill(s), Type:  Maintenance   Problem list:    All Problems  Obese / ICD-9-.00 / Probable  Obstructive sleep apnea / SNOMED CT 056384643 / Confirmed  Tobacco Use Disorder, Continuous / ICD-9-.1 / Confirmed  Inactive: CTS - Carpal tunnel syndrome / SNOMED CT 100490270      Histories   Past Medical History:    Active  Obstructive sleep apnea (905878541): Onset on 3/1/2011 at 50 years.  Comments:  3/17/2011 CDT 9:38 AM CDT - Tarun Nielson MD  AHI 13.2, RDI 20.9, CPAP 8  Tobacco Use Disorder, Continuous (305.1)  Comments:  2011 CST 10:14 AM CST - Jaylyn Mendieta  Oral tobacco  Obese (278.00)   Family History:    Cancer  Father ()  Comments:  2011 10:18 AM - Jaylyn Mendieta  CA of throat  Breast cancer  Mother ()     Procedure history:    Left ankle surgery on 2016 at 56 Years.  Comments:  2016 9:34 AM - Noreen Gannon  Open debridement with loose body removal.   Subtalar corrective fusion with platelet derived growth factor augment graft.   !st metatarsal dorsiflexioin osteotomy.  Lateral ligament reconstruction.  Peroneus longus debridement and tenodesis of brevis.  Colonoscopy (594464147) on 2011 at 50 Years.  Comments:  2011 7:41 PM - Stacy Peña MA  Normal colonoscopy, to come back in 10 years, and use propofol for next colonoscopy.  Reconstruction of facial bones (018523294) in  at 38 Years.  Comments:  2011 10:16 AM - Jaylyn Sanchez  Status post trauma  Tonsillectomy and adenoidectomy (617342956) in 1963 at 3 Years.   Social History:        Alcohol Assessment            4 x's per week, 2 drinks/episode average.      Tobacco Assessment            Current, Snuff                     Comments:                      2010 - Halina Eric LPN                     one tin per week      Exercise and Physical Activity Assessment: Regular exercise      Other Assessment            Marital Status,         Physical Examination   Vital Signs   2017 9:12 AM  CDT Temperature Temporal 98.4 DegF    Peripheral Pulse Rate 80 bpm    Pulse Site Radial artery    HR Method Manual    Systolic Blood Pressure 136 mmHg    Diastolic Blood Pressure 88 mmHg    Mean Arterial Pressure 104 mmHg    BP Site Left arm    BP Method Manual      Measurements from flowsheet : Measurements   6/20/2017 9:12 AM CDT Height Measured - Standard 74 in    Weight Measured - Standard 249 lb    BSA 2.43 m2    Body Mass Index 31.97 kg/m2      Cardiovascular:          Arterial pulses: Bilateral, Dorsalis pedis, 2+.         Capillary refill: Bilateral, Lower extremity, Within normal limits.    Musculoskeletal:  No deformity, Swelling left ankle. Diffuse tenderness..    Integumentary:  No rash.    Neurologic:  No focal deficits.       Impression and Plan   Diagnosis     Osteoarthritis of left ankle (PLV84-JA M19.072).     Patient Instructions:       Counseled: Patient, Regarding diagnosis, Regarding medications, Activity, Verbalized understanding.    Orders     Orders (Selected)   Prescriptions  Prescribed  Flexeril 10 mg oral tablet: 1 tab(s) ( 10 mg ), PO, TID, PRN: for spasm, # 30 tab(s), 1 Refill(s), Type: Maintenance, Pharmacy: [id number], 1 tab(s) po tid,PRN:for spasm  piroxicam 20 mg oral capsule: 1 cap(s) ( 20 mg ), PO, Daily, # 30 cap(s), 3 Refill(s), Type: Maintenance, Pharmacy: [id number], 1 cap(s) po daily.     Trial of new Meds. Will send to PT.

## 2022-02-16 NOTE — NURSING NOTE
Comprehensive Intake Entered On:  9/25/2019 10:02 AM CDT    Performed On:  9/25/2019 9:57 AM CDT by Radha Moreno CMA               Summary   Chief Complaint :   Physical   Advance Directive :   Yes   Menstrual Status :   N/A   Weight Measured :   249.6 lb(Converted to: 249 lb 10 oz, 113.22 kg)    Height Measured :   73.5 in(Converted to: 6 ft 1 in, 186.69 cm)    Body Mass Index :   32.48 kg/m2 (HI)    Body Surface Area :   2.42 m2   Systolic Blood Pressure :   122 mmHg   Diastolic Blood Pressure :   82 mmHg (HI)    Mean Arterial Pressure :   95 mmHg   Peripheral Pulse Rate :   76 bpm   BP Site :   Left arm   BP Method :   Manual   HR Method :   Electronic   Temperature Temporal :   97.4 DegF(Converted to: 36.3 DegC)    Radha Moreno CMA - 9/25/2019 9:57 AM CDT   Health Status   Allergies Verified? :   Yes   Medication History Verified? :   Yes   Immunizations Current :   Yes   Medical History Verified? :   Yes   Pre-Visit Planning Status :   Completed   Tobacco Use? :   Never smoker   Radha Moreno CMA - 9/25/2019 9:57 AM CDT   Consents   Consent for Immunization Exchange :   Consent Granted   Consent for Immunizations to Providers :   Consent Granted   Radha Moreno CMA - 9/25/2019 9:57 AM CDT   Meds / Allergies   (As Of: 9/25/2019 10:02:22 AM CDT)   Allergies (Active)   No Known Medication Allergies  Estimated Onset Date:   Unspecified ; Created By:   Daly Amaro CMA; Reaction Status:   Active ; Category:   Drug ; Substance:   No Known Medication Allergies ; Type:   Allergy ; Updated By:   Daly Amaro CMA; Reviewed Date:   9/25/2019 10:01 AM CDT        Medication List   (As Of: 9/25/2019 10:02:22 AM CDT)   Prescription/Discharge Order    Miscellaneous Rx Supply  :   Miscellaneous Rx Supply ; Status:   Prescribed ; Ordered As Mnemonic:   CPAP machine and supplies ; Simple Display Line:   See Instructions, USE for SANDEE, 1 EA, 0 Refill(s) ; Ordering Provider:   Kimo Benitez PA-C; Catalog Code:    Miscellaneous Rx Supply ; Order Dt/Tm:   3/10/2018 3:25:45 PM          rosuvastatin  :   rosuvastatin ; Status:   Prescribed ; Ordered As Mnemonic:   rosuvastatin 10 mg oral tablet ; Simple Display Line:   1 tab(s), Oral, qhs, 90 tab(s), 2 Refill(s) ; Ordering Provider:   Kimo Benitez PA-C; Catalog Code:   rosuvastatin ; Order Dt/Tm:   1/2/2019 8:58:33 AM            Home Meds    celecoxib  :   celecoxib ; Status:   Documented ; Ordered As Mnemonic:   CeleBREX ; Simple Display Line:   Oral, 0 Refill(s) ; Catalog Code:   celecoxib ; Order Dt/Tm:   8/30/2018 10:00:07 AM          ibuprofen  :   ibuprofen ; Status:   Documented ; Ordered As Mnemonic:   IBU ; Simple Display Line:   Oral, 0 Refill(s) ; Catalog Code:   ibuprofen ; Order Dt/Tm:   9/25/2019 10:01:45 AM            Past Medical History   Past Medical History   (As Of: 9/25/2019 10:02:22 AM CDT)   CTS - Carpal tunnel syndrome  Name of Problem:   CTS - Carpal tunnel syndrome ; Onset Date:   7/1992 ; Recorder:   Halina Eric LPN; Confirmation:   Confirmed ; Classification:   Medical ; Code:   116558710 ; Contributor System:   [employer] ; Last Updated:   4/7/2010 3:24 PM CDT ; Life Cycle Date:   4/7/2010 ; Life Cycle Status:   Inactive ; Vocabulary:   SNOMED CT ; Comments:      4/7/2010 3:24 PM - Halina Eric LPN  Left wrist          Tobacco Use Disorder, Continuous  Name of Problem:   Tobacco Use Disorder, Continuous ; Recorder:   Jaylyn Mendieta; Confirmation:   Confirmed ; Classification:   Medical ; Code:   305.1 ; Contributor System:   [employer] ; Last Updated:   2/28/2014 7:02 PM CST ; Life Cycle Date:   1/7/2011 ; Life Cycle Status:   Active ; Vocabulary:   ICD-9-CM ; Comments:      1/7/2011 10:14 AM - Jaylyn Mendieta  Oral tobacco          Obese  Name of Problem:   Obese ; Recorder:   SYSTEM; Confirmation:   Probable ; Classification:   Medical ; Code:   278.00 ; Contributor System:   Power[employer] ; Last Updated:   2/28/2014 7:02 PM CST ; Life  Cycle Date:   3/17/2011 ; Life Cycle Status:   Active ; Vocabulary:   ICD-9-CM          Obstructive sleep apnea  Name of Problem:   Obstructive sleep apnea ; Onset Date:   3/1/2011 ; Recorder:   Tarun Nielson MD; Confirmation:   Confirmed ; Classification:   Medical ; Code:   826717615 ; Contributor System:   [employer] ; Last Updated:   3/2/2015 11:33 AM CST ; Life Cycle Date:   3/17/2011 ; Life Cycle Status:   Active ; Responsible Provider:   Tarun Nielson MD; Vocabulary:   SNOMED CT ; Comments:      3/17/2011 9:38 AM - Tarun Nielson MD  AHI 13.2, RDI 20.9, CPAP 8            Procedures / Surgeries        -    Procedure History   (As Of: 9/25/2019 10:02:22 AM CDT)     Procedure Dt/Tm:   1963 ; Anesthesia Minutes:   0 ; Procedure Name:   Tonsillectomy and adenoidectomy ; Procedure Minutes:   0 ; Last Reviewed Dt/Tm:   9/25/2019 10:02:16 AM CDT            Procedure Dt/Tm:   1998 ; Anesthesia Minutes:   0 ; Procedure Name:   Reconstruction of facial bones ; Procedure Minutes:   0 ; Comments:     1/7/2011 10:16 AM CST - Jaylyn Sanchez  Status post trauma ; Last Reviewed Dt/Tm:   9/25/2019 10:02:16 AM CDT            Procedure Dt/Tm:   1/20/2011 ; Provider:   Jass Eduardo MD; Anesthesia Minutes:   0 ; Procedure Name:   Colonoscopy ; Procedure Minutes:   0 ; Comments:     1/24/2011 7:41 PM CST - Stacy Peña MA  Normal colonoscopy, to come back in 10 years, and use propofol for next colonoscopy. ; Last Reviewed Dt/Tm:   9/25/2019 10:02:16 AM CDT            Procedure Dt/Tm:   7/1/2016 ; Anesthesia Minutes:   0 ; Procedure Name:   Left ankle surgery ; Procedure Minutes:   0 ; Comments:     8/16/2016 9:34 AM CDT - Noreen Gannon  Open debridement with loose body removal.   Subtalar corrective fusion with platelet derived growth factor augment graft.   !st metatarsal dorsiflexioin osteotomy.  Lateral ligament reconstruction.  Peroneus longus debridement and tenodesis of brevis. ; Last Reviewed Dt/Tm:   9/25/2019  10:02:16 AM CDT            Procedure Dt/Tm:   2017 ; Anesthesia Minutes:   0 ; Procedure Name:   Arthrodesis of ankle ; Procedure Minutes:   0 ; Comments:     2017 2:44 PM CDT - Noreen Gannon  Left ankle and bone grafting.  Hardware removal x 1 screw. ; Last Reviewed Dt/Tm:   2019 10:02:16 AM CDT            Family History   Family History   (As Of: 2019 10:02:22 AM CDT)   Mother:   Relation:   Mother ; Gender:   Female ;  ; Age at Death:    78 Years            Nomenclature:   Breast cancer ; Value:   Positive          Father:   Relation:   Father ; Gender:   Male ;  ; Age at Death:    61 Years            Nomenclature:   Cancer ; Comments:   2011 10:18 AM CST - Jaylyn Mendieta  CA of throat ; Value:   Positive            Social History   Social History   (As Of: 2019 10:02:22 AM CDT)   Alcohol:  Past      ., 2 drinks/episode average.   (Last Updated: 2018 8:36:45 AM CDT by Noreen Gannon)          Tobacco:  Past      Current, Snuff   Comments:  2010 3:25 PM - Halina Eric LPN: one tin per week   (Last Updated: 2010 3:25:38 PM CDT by Halina Eric LPN)          Substance Abuse:  Denies Substance Abuse      Never   (Last Updated: 2018 8:36:31 AM CDT by Noreen Gannon)          Employment/School:        Employed, Work/School description: .   (Last Updated: 2018 8:36:10 AM CDT by Noreen Gannon)          Home/Environment:        Marital status: .  Spouse/Partner name: Sandrita.   (Last Updated: 2018 8:35:53 AM CDT by Noreen Gannon)          Nutrition/Health:        Type of diet: Regular.   (Last Updated: 2018 8:37:08 AM CDT by Noreen Gannon)          Exercise:  Regular exercise      (Last Updated: 2010 3:35:36 PM CDT by Lelo Quiroz )         Other:        .   (Last Updated: 2018 8:36:57 AM CDT by Noreen Gannon)

## 2022-02-16 NOTE — PROGRESS NOTES
Patient:   MANDO NEGRO            MRN: [id number]            FIN: [id number]               Age:   58 years     Sex:  Male     :  1960   Associated Diagnoses:   Obstructive sleep apnea; Annual physical exam; Obese; Screening cholesterol level; Diabetes mellitus screening; Prostate cancer screening   Author:   Kimo Benitez PA-C      Report Summary   Diagnosis  Obstructive sleep apnea (ONV52-ZW G47.33).  Patient InstructionsOrders   Visit Information      Date of Service: 2018 09:53 am  Performing Location: [facility]  Encounter#: [id number]      Primary Care Provider (PCP):  Kimo Benitez PA-C    NPI# [id number]      Referring Provider:  Kimo Benitez PA-C    NPI# [id number]   Visit type:  Annual exam.    Accompanied by:  No one.    Source of history:  Medical record.    Referral source:  Self.    History limitation:  None.       Chief Complaint   2018 9:57 AM CDT    Physical        Well Adult History   Well Adult History             The patient presents for well adult exam.  The patient's general health status is described as good.  The patient's diet is described as balanced.  Exercise: routine.  Complaint: Still bothered by left ankle and foot pain. Has had fusion. Sees ortho in September. CC above noted and confirmed with the patient. Likes his hearing aids. 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 .     Check right ankle. Injured when attacked by bull over one year ago. Did not see anyone at time of injury. No other concerns.      Review of Systems   Constitutional:  Negative.    Eye:  Negative.    Ear/Nose/Mouth/Throat:  Negative.    Respiratory:  Negative.    Cardiovascular:  Negative.    Gastrointestinal:  Negative.    Genitourinary:  Negative.    Hematology/Lymphatics:  Negative.    Endocrine:  Negative.    Immunologic:  Negative.    Musculoskeletal:  Negative except as documented in history of present illness.    Integumentary:  Negative.    Neurologic:  Negative.     Psychiatric:  Negative.    All other systems reviewed and negative      Health Status   Allergies:    Allergic Reactions (All)  No Known Medication Allergies  Canceled/Inactive Reactions (All)  No known allergies   Medications:  (Selected)   Prescriptions  Prescribed  CPAP machine and supplies: CPAP machine and supplies, See Instructions, Instructions: USE for SANDEE, Supply, # 1 EA, 0 Refill(s), Type: Maintenance  Documented Medications  Documented  CeleBREX: Oral, 0 Refill(s), Type: Maintenance  acetaminophen: prn, 0 Refill(s), Type: Maintenance   Problem list:    All Problems  Obese / ICD-9-.00 / Probable  Obstructive sleep apnea / SNOMED CT 707936189 / Confirmed  Tobacco Use Disorder, Continuous / ICD-9-.1 / Confirmed  Inactive: CTS - Carpal tunnel syndrome / SNOMED CT 973053174      Histories   Past Medical History:    Active  Obstructive sleep apnea (267541860): Onset on 3/1/2011 at 50 years.  Comments:  3/17/2011 CDT 9:38 AM CDT - Tarun Nielson MD  AHI 13.2, RDI 20.9, CPAP 8  Tobacco Use Disorder, Continuous (305.1)  Comments:  2011 CST 10:14 AM CST - Jaylyn Mendieta  Oral tobacco  Obese (278.00)   Family History:    Cancer  Father ()  Comments:  2011 10:18 AM - Jaylyn Mendieta  [place] of throat  Breast cancer  Mother ()     Procedure history:    Arthrodesis of ankle (526674793) on 2017 at 57 Years.  Comments:  2017 2:44 PM - Noreen Gannon  Left ankle and bone grafting.  Hardware removal x 1 screw.  Left ankle surgery on 2016 at 56 Years.  Comments:  2016 9:34 AM - Noreen Gannon  Open debridement with loose body removal.   Subtalar corrective fusion with platelet derived growth factor augment graft.   !st metatarsal dorsiflexioin osteotomy.  Lateral ligament reconstruction.  Peroneus longus debridement and tenodesis of brevis.  Colonoscopy ([id number]) on 2011 at 50 Years.  Comments:  2011 7:41 PM - Stacy Peña MA  Normal colonoscopy, to come  back in 10 years, and use propofol for next colonoscopy.  Reconstruction of facial bones (334941586) in 1998 at 38 Years.  Comments:  1/7/2011 10:16 AM - Jaylyn Sanchez  Status post trauma  Tonsillectomy and adenoidectomy (124974952) in 1963 at 3 Years.   Social History:        Alcohol Assessment            4 x's per week, 2 drinks/episode average.      Tobacco Assessment: Past            Current, Snuff                     Comments:                      04/07/2010 - Halina Eric LPN                     one tin per week      Employment and Education Assessment            Employed, Work/School description: farmer.      Exercise and Physical Activity Assessment: Regular exercise      Other Assessment            Marital Status,         Physical Examination   Vital Signs   8/30/2018 9:57 AM CDT Peripheral Pulse Rate 80 bpm    Pulse Site Radial artery    HR Method Manual    Systolic Blood Pressure 138 mmHg  HI    Diastolic Blood Pressure 80 mmHg    Mean Arterial Pressure 99 mmHg    BP Site Right arm    BP Method Manual      Measurements from flowsheet : Measurements   8/30/2018 9:57 AM CDT Height Measured - Standard 73.5 in    Weight Measured - Standard 250.4 lb    BSA 2.42 m2    Body Mass Index 32.58 kg/m2  HI      General:  No acute distress.    Eye:  Pupils are equal, round and reactive to light, Extraocular movements are intact, Normal conjunctiva.    HENT:  Normocephalic, Tympanic membranes are clear, Oral mucosa is moist, No pharyngeal erythema.    Neck:  Supple, Non-tender, No lymphadenopathy, No thyromegaly.    Respiratory:  Lungs are clear to auscultation, Respirations are non-labored, Breath sounds are equal.    Cardiovascular:  Normal rate, Regular rhythm, No murmur, Good pulses equal in all extremities, Normal peripheral perfusion.         Edema: Left, Leg, Ankle.    Gastrointestinal:  Soft, Non-tender, Non-distended, Normal bowel sounds, No organomegaly.    Genitourinary:  No costovertebral angle  tenderness.    Musculoskeletal:  No swelling, No deformity, Normal gait.    Integumentary:  No rash.    Neurologic:  No focal deficits.    Psychiatric:  Cooperative, Appropriate mood & affect.       Health Maintenance      Recommendations     Pending (in the next year)        OverDue           Lipid Disorders Screen (Male) due  06/16/17  and every 1  year(s)           Influenza Vaccine due  10/11/17  and every 1  year(s)           Alcohol Misuse Screen (Male) due  12/26/17  and every 1  year(s)           Depression Screen (Male) due  12/26/17  and every 1  year(s)        Due            Aspirin Therapy for Prevention of CVD (Male) due  08/30/18  and every 5  year(s)           Hepatitis C Screen [id number] (Male) due  08/30/18  One-time only           Lung Cancer Screen (Male) due  08/30/18  and every 1  year(s)           Type 2 Diabetes Mellitus Screen (Male) due  08/30/18  Variable frequency     Satisfied (in the past 1 year)        Satisfied            Body Mass Index Check (Male) on  08/30/18.           Body Mass Index Check (Male) on  12/22/17.           High Blood Pressure Screen (Male) on  08/30/18.           High Blood Pressure Screen (Male) on  12/22/17.           Obesity Screen and Counseling (Male) on  08/30/18.           Obesity Screen and Counseling (Male) on  12/22/17.        Impression and Plan   Diagnosis     Obstructive sleep apnea (OCG47-JW G47.33).     Annual physical exam (FIO64-QI Z00.00).     Obese (JLR90-VE E66.9).     Screening cholesterol level (TRB42-TI Z13.220).     Diabetes mellitus screening (GHN39-CU Z13.1).     Prostate cancer screening (VII94-YV Z12.5).     Patient Instructions:       Counseled: Patient, Regarding medications, Verbalized understanding.    Orders     Orders (Selected)   Outpatient Orders  Ordered  Return to Clinic (Request): RFV: CBC, chem 8, lipid, PSA.     I want him to talk to ortho about possibly trying gabapentin for his ankle/foot pain. FU in one year.

## 2022-02-16 NOTE — LETTER
(Inserted Image. Unable to display)   October 12, 2021    MANDO HALLS   [address] [place] [id number]            Dear MANDO,      Thank you for selecting [facility] for your healthcare needs.    Our records indicate you are due for the following services:    Annual Physical.    Fasting Lab Tests ~ Please do not eat or drink anything 10 hours prior to your scheduled appointment time.  (Water and any medications that you may need are allowed unless directed otherwise.)    If you had your labs done at another facility or with Direct Access Lab Testing at [facility], please bring in a copy of the results to your next visit, mail a copy, or drop off a copy of your results to your Healthcare Provider.      (FYI   Regarding office visits: In some instances, a video visit or telephone visit may be offered as an option.)    You are due for lab work and an office visit; please schedule the lab appointment 1 week before the office visit.  This will assure all results are available to discuss with your Healthcare Provider during your visit.    **It is very helpful if you bring your medication bottles to your appointment.  This assures we have all of your current medications, including strength and dosing information, documented accurately in your medical record.    To schedule an appointment or if you have further questions, please contact your clinic at [phone].      Powered by [employer]    Sincerely,    Kimo Benitez PA-C

## 2022-02-16 NOTE — TELEPHONE ENCOUNTER
---------------------  From: Arleen Brown CMA (Phone Messages Pool ([id number]_[place] - [place])   To: Kimo Benitez PA-C;     Sent: 3/30/2020 11:18:43 AM CDT  Subject: Phone Note: Tramadol     Phone Message    PCP:   KAH      Time of Call:  10:33 am    Phone number:  [phone]    Returned call at: 11:17 am    Note:   Pt called stating that he is needing a refill of Tramadol 50 mg. States that he takes this for his left ankle. Is planning on having surgery this fall to correct the past two fusions he has had. States that he takes about 3-4 tablets per day. He currently only has a few tablets left. Please advise.     Last refilled on 12/13/19; #90 w/ 2 refills.    Pharmacy: [facility]    Last office visit and reason: 2/20/20; sinus     Transferred to: KAH---------------------  From: Kimo Benitez PA-C   To: Phone Messages Aarki ([id number]_[place] - [place]);     Sent: 3/30/2020 11:38:20 AM CDT  Subject: RE: Phone Note: Tramadol     I checked PDMP and sent that in    KAHReturned Call  Time: 11:42 am  Note:  Called & notified pt of the Rx refill.

## 2022-02-16 NOTE — NURSING NOTE
Quick Intake Entered On:  2/8/2021 10:15 AM CST    Performed On:  2/8/2021 10:15 AM CST by Tarun Nielson MD               Summary   Advance Directive :   Yes   Menstrual Status :   N/A   Height Measured :   73.5 in(Converted to: 6 ft 1 in, 186.69 cm)    Systolic Blood Pressure :   146 mmHg (HI)    Diastolic Blood Pressure :   86 mmHg (HI)    Mean Arterial Pressure :   106 mmHg   BP Site :   Right arm   BP Method :   Manual   Tarun Nielson MD - 2/8/2021 10:15 AM CST

## 2022-02-16 NOTE — NURSING NOTE
CAGE Assessment Entered On:  9/25/2019 11:09 AM CDT    Performed On:  9/25/2019 11:08 AM CDT by Radha Moreno CMA               Assessment   Have you ever felt you should cut down on your drinking :   No   Have people annoyed you by criticizing your drinking :   No   Have you ever felt bad or guilty about your drinking :   No   Have you ever taken a drink first thing in the morning to steady your nerves or get rid of a hangover (Eye-opener) :   No   CAGE Score :   0    Radha Moreno CMA - 9/25/2019 11:08 AM CDT

## 2022-03-01 ENCOUNTER — TRANSFERRED RECORDS (OUTPATIENT)
Dept: HEALTH INFORMATION MANAGEMENT | Facility: CLINIC | Age: 62
End: 2022-03-01
Payer: COMMERCIAL

## 2022-03-02 NOTE — TELEPHONE ENCOUNTER
---------------------  From: Kate Boateng RN (Phone Messages Pool ([id number]_[facility]))   To: KAH Message Pool ([id number]_[facility]);     Sent: 1/5/2022 8:05:48 AM CST  Subject: sinus       PCP:  PAUL      Time of Call:  7:45am       Person Calling:  pt  Phone number:  [phone]    Note:   VM received from pt, stating he was treated for sinus infection about 3 weeks ago. Was treated with Augmentin. The symptoms have not improved and is requesting PAUL to prescribe something to clear this up.    Please advise.     Last office visit and reason:  12/22/21 sinus problems KAH---------------------  From: Radha Moreno CMA (Weaved Message Pool ([id number]_[facility]))   To: Kimo Benitez PA-C;     Sent: 1/5/2022 8:32:56 AM CST  Subject: FW: sinus---------------------  From: Kimo Benitez PA-C   To: PAUL Message Pool ([id number]_[facility]);     Sent: 1/5/2022 8:59:02 AM CST  Subject: RE: sinus     Omnicef 300 mg po BID x two weeks    KAHrx sent to Martin was notified.

## 2022-03-02 NOTE — TELEPHONE ENCOUNTER
Entered by Kimo Benitez PA-C on January 19, 2022 10:54:01 AM CST  ---------------------  From: Kimo Benitez PA-C   To: [facility]    Sent: 1/19/2022 10:54:01 AM CST  Subject: Medication Management     ** Submitted: **  Complete:rosuvastatin (rosuvastatin 10 mg oral tablet)   Signed by Kimo Benitez PA-C  1/19/2022 4:54:00 PM [facility]    ** Approved with modifications: **  rosuvastatin (ROSUVASTATIN CALCIUM 10MG TABLET)  TAKE 1 TABLET BY MOUTH AT BEDTIME  Qty:  90 tab(s)        Days Supply:  90        Refills:  3          Substitutions Allowed     Route To Pharmacy - [facility]               ------------------------------------------  From: BLOVES  To: Kimo Benitez PA-C  Sent: January 19, 2022 8:23:12 AM CST  Subject: Medication Management  Due: December 15, 2021 8:09:06 PM CST     ** On Hold Pending Signature **     Drug: rosuvastatin (rosuvastatin 10 mg oral tablet), TAKE 1 TABLET BY MOUTH AT BEDTIME  Quantity: 90 tab(s)  Days Supply: 90  Refills: 3  Substitutions Allowed  Notes from Pharmacy:     Dispensed Drug: rosuvastatin (rosuvastatin 10 mg oral tablet), TAKE 1 TABLET BY MOUTH AT BEDTIME  Quantity: 90 tab(s)  Days Supply: 90  Refills: 3  Substitutions Allowed  Notes from Pharmacy:  ------------------------------------------

## 2022-08-30 ENCOUNTER — OFFICE VISIT (OUTPATIENT)
Dept: FAMILY MEDICINE | Facility: CLINIC | Age: 62
End: 2022-08-30
Payer: COMMERCIAL

## 2022-08-30 VITALS
DIASTOLIC BLOOD PRESSURE: 86 MMHG | BODY MASS INDEX: 33.19 KG/M2 | HEART RATE: 86 BPM | WEIGHT: 255 LBS | SYSTOLIC BLOOD PRESSURE: 146 MMHG

## 2022-08-30 DIAGNOSIS — S91.302D: Primary | ICD-10-CM

## 2022-08-30 PROBLEM — Z96.651 S/P TOTAL KNEE ARTHROPLASTY, RIGHT: Status: ACTIVE | Noted: 2019-12-31

## 2022-08-30 PROBLEM — G89.29 CHRONIC ANKLE PAIN: Status: ACTIVE | Noted: 2022-08-30

## 2022-08-30 PROBLEM — M19.072 PRIMARY OSTEOARTHRITIS, LEFT ANKLE AND FOOT: Status: ACTIVE | Noted: 2017-09-06

## 2022-08-30 PROBLEM — R03.0 ELEVATED BLOOD PRESSURE READING WITHOUT DIAGNOSIS OF HYPERTENSION: Status: ACTIVE | Noted: 2017-09-06

## 2022-08-30 PROBLEM — G47.33 OSA ON CPAP: Status: ACTIVE | Noted: 2017-09-06

## 2022-08-30 PROBLEM — E78.5 DYSLIPIDEMIA: Status: ACTIVE | Noted: 2022-08-30

## 2022-08-30 PROBLEM — M25.579 CHRONIC ANKLE PAIN: Status: ACTIVE | Noted: 2022-08-30

## 2022-08-30 PROBLEM — E66.9 OBESITY: Status: ACTIVE | Noted: 2022-08-30

## 2022-08-30 PROCEDURE — 99213 OFFICE O/P EST LOW 20 MIN: CPT | Performed by: PHYSICIAN ASSISTANT

## 2022-08-30 ASSESSMENT — ENCOUNTER SYMPTOMS
ACTIVITY CHANGE: 1
PARESTHESIAS: 0
FEVER: 0
WOUND: 1
CHILLS: 0
NUMBNESS: 0

## 2022-08-30 NOTE — PROGRESS NOTES
"  Assessment & Plan     Avulsion of skin of left foot, subsequent encounter  Healing without sign of infection             BMI:   Estimated body mass index is 33.19 kg/m  as calculated from the following:    Height as of 12/22/21: 1.867 m (6' 1.5\").    Weight as of this encounter: 115.7 kg (255 lb).           No follow-ups on file.    ALIDA Cannon  [facility]    Sidney Paredes is a 62 year old, presenting for the following health issues:  Foot Problems (Cut on Left Foot) and Hospital F/U (8-26-22)      62-year-old male presents to the clinic follow-up evaluation for avulsion injury of left heel  He slipped in the shower cracked a ceramic tile and he suffered this injury  He was seen in the emergency department they were able to control the bleeding and they placed him on cephalexin 500 mg 3 times a day for 3 days  He is here for follow-up no complaints or concerns    History of Present Illness       Reason for visit:  Cut on my left foot  Symptom onset:  1-3 days ago  Symptom intensity:  Mild  Symptom progression:  Improving  Had these symptoms before:  No  What makes it worse:  No  What makes it better:  No    He eats 2-3 servings of fruits and vegetables daily.He consumes 0 sweetened beverage(s) daily.He exercises with enough effort to increase his heart rate 20 to 29 minutes per day.  He exercises with enough effort to increase his heart rate 7 days per week. He is missing 2 dose(s) of medications per week.       Onset: 8-26-22  Description: Slipped in the shower, foot hit the wall, cracking the tile and lacerating the foot. Here for Hosp follow-up appointment.  Intensity: moderate  Progression of Symptoms:  same  Therapies tried and outcome: Finished Keflex 500mg BID for 3 days.    Patient has no concerns at this time.    Review of Systems   Constitutional: Positive for activity change. Negative for chills and fever.   Skin: Positive for wound.   Neurological: Negative " for numbness and paresthesias.            Objective    BP (!) 146/86 (BP Location: Right arm, Cuff Size: Adult Large)   Pulse 86   Wt 115.7 kg (255 lb)   BMI 33.19 kg/m    Body mass index is 33.19 kg/m .  Physical Exam Quarter sized avulsion left medial heel no erythema no drainage evidence of early interval healing the surrounding skin is slightly macerated dressed with gauze and Coban he will watch for signs of infection keep it clean and dry and will follow as needed                    .  ..

## 2022-09-29 NOTE — ANESTHESIA POSTPROCEDURE EVALUATION
Patient: Reggie Owens    Procedure(s):  REVISION LEFT ANKLE FUSION WITH  CORRECTIVE OSTEOTOMY    Diagnosis:Disorder of ankle [M25.9]  Diagnosis Additional Information: No value filed.    Anesthesia Type:  General, Peripheral Nerve Block    Note:  Anesthesia Post Evaluation         Comments: Patient needed a post op saphenous block to help medial ankle pain. 9/10 down to 2/10 after block.         Last vitals:  Vitals:    11/09/20 1045 11/09/20 1100 11/09/20 1115   BP: (!) 141/99 132/88 128/82   Pulse: 75 75 66   Resp: 12 20 10   Temp:      SpO2: 97% 96% 95%         Electronically Signed By: Bean Hernández MD  November 9, 2020  11:24 AM   Statement Selected

## 2022-10-04 ENCOUNTER — OFFICE VISIT (OUTPATIENT)
Dept: FAMILY MEDICINE | Facility: CLINIC | Age: 62
End: 2022-10-04
Payer: COMMERCIAL

## 2022-10-04 VITALS
WEIGHT: 258 LBS | SYSTOLIC BLOOD PRESSURE: 158 MMHG | DIASTOLIC BLOOD PRESSURE: 98 MMHG | BODY MASS INDEX: 33.58 KG/M2 | HEART RATE: 75 BPM

## 2022-10-04 DIAGNOSIS — Z12.5 SCREENING FOR PROSTATE CANCER: ICD-10-CM

## 2022-10-04 DIAGNOSIS — Z11.59 NEED FOR HEPATITIS C SCREENING TEST: ICD-10-CM

## 2022-10-04 DIAGNOSIS — J01.01 ACUTE RECURRENT MAXILLARY SINUSITIS: ICD-10-CM

## 2022-10-04 DIAGNOSIS — Z11.4 SCREENING FOR HIV (HUMAN IMMUNODEFICIENCY VIRUS): ICD-10-CM

## 2022-10-04 DIAGNOSIS — Z79.899 ENCOUNTER FOR LONG-TERM (CURRENT) USE OF MEDICATIONS: ICD-10-CM

## 2022-10-04 DIAGNOSIS — G89.29 CHRONIC PAIN OF LEFT ANKLE: Primary | ICD-10-CM

## 2022-10-04 DIAGNOSIS — M25.572 CHRONIC PAIN OF LEFT ANKLE: Primary | ICD-10-CM

## 2022-10-04 DIAGNOSIS — E78.2 MIXED HYPERLIPIDEMIA: ICD-10-CM

## 2022-10-04 DIAGNOSIS — Z00.00 ANNUAL PHYSICAL EXAM: ICD-10-CM

## 2022-10-04 DIAGNOSIS — G47.33 OSA ON CPAP: ICD-10-CM

## 2022-10-04 DIAGNOSIS — Z98.1 S/P ANKLE FUSION: ICD-10-CM

## 2022-10-04 DIAGNOSIS — E78.5 DYSLIPIDEMIA: ICD-10-CM

## 2022-10-04 DIAGNOSIS — L57.0 ACTINIC KERATOSIS: ICD-10-CM

## 2022-10-04 LAB
ANION GAP SERPL CALCULATED.3IONS-SCNC: 9 MMOL/L (ref 7–15)
BUN SERPL-MCNC: 17.7 MG/DL (ref 8–23)
CALCIUM SERPL-MCNC: 9.4 MG/DL (ref 8.8–10.2)
CHLORIDE SERPL-SCNC: 106 MMOL/L (ref 98–107)
CHOLEST SERPL-MCNC: 189 MG/DL
CREAT SERPL-MCNC: 0.85 MG/DL (ref 0.67–1.17)
DEPRECATED HCO3 PLAS-SCNC: 26 MMOL/L (ref 22–29)
GFR SERPL CREATININE-BSD FRML MDRD: >90 ML/MIN/1.73M2
GLUCOSE SERPL-MCNC: 127 MG/DL (ref 70–99)
HCV AB SERPL QL IA: NONREACTIVE
HDLC SERPL-MCNC: 63 MG/DL
HIV 1+2 AB+HIV1 P24 AG SERPL QL IA: NONREACTIVE
LDLC SERPL CALC-MCNC: 106 MG/DL
NONHDLC SERPL-MCNC: 126 MG/DL
POTASSIUM SERPL-SCNC: 4.1 MMOL/L (ref 3.4–5.3)
PSA SERPL-MCNC: 3.67 NG/ML (ref 0–4.5)
SODIUM SERPL-SCNC: 141 MMOL/L (ref 136–145)
TRIGL SERPL-MCNC: 98 MG/DL

## 2022-10-04 PROCEDURE — 17000 DESTRUCT PREMALG LESION: CPT | Performed by: PHYSICIAN ASSISTANT

## 2022-10-04 PROCEDURE — 36415 COLL VENOUS BLD VENIPUNCTURE: CPT | Performed by: PHYSICIAN ASSISTANT

## 2022-10-04 PROCEDURE — 90750 HZV VACC RECOMBINANT IM: CPT | Performed by: PHYSICIAN ASSISTANT

## 2022-10-04 PROCEDURE — 90472 IMMUNIZATION ADMIN EACH ADD: CPT | Performed by: PHYSICIAN ASSISTANT

## 2022-10-04 PROCEDURE — 80048 BASIC METABOLIC PNL TOTAL CA: CPT | Performed by: PHYSICIAN ASSISTANT

## 2022-10-04 PROCEDURE — 90471 IMMUNIZATION ADMIN: CPT | Performed by: PHYSICIAN ASSISTANT

## 2022-10-04 PROCEDURE — 80061 LIPID PANEL: CPT | Performed by: PHYSICIAN ASSISTANT

## 2022-10-04 PROCEDURE — 87389 HIV-1 AG W/HIV-1&-2 AB AG IA: CPT | Performed by: PHYSICIAN ASSISTANT

## 2022-10-04 PROCEDURE — G0103 PSA SCREENING: HCPCS | Performed by: PHYSICIAN ASSISTANT

## 2022-10-04 PROCEDURE — 86803 HEPATITIS C AB TEST: CPT | Performed by: PHYSICIAN ASSISTANT

## 2022-10-04 PROCEDURE — 99396 PREV VISIT EST AGE 40-64: CPT | Mod: 25 | Performed by: PHYSICIAN ASSISTANT

## 2022-10-04 PROCEDURE — 90682 RIV4 VACC RECOMBINANT DNA IM: CPT | Performed by: PHYSICIAN ASSISTANT

## 2022-10-04 RX ORDER — ROSUVASTATIN CALCIUM 10 MG/1
10 TABLET, COATED ORAL AT BEDTIME
Qty: 90 TABLET | Refills: 3 | Status: SHIPPED | OUTPATIENT
Start: 2022-10-04 | End: 2022-10-05

## 2022-10-04 ASSESSMENT — ENCOUNTER SYMPTOMS
PARESTHESIAS: 0
HEMATURIA: 0
CONSTIPATION: 0
HEADACHES: 0
EYE PAIN: 0
CHILLS: 0
FEVER: 0
DIZZINESS: 0
NERVOUS/ANXIOUS: 0
DIARRHEA: 0
WEAKNESS: 0
ABDOMINAL PAIN: 0
COUGH: 0
SORE THROAT: 0
DYSURIA: 0
HEMATOCHEZIA: 0
JOINT SWELLING: 0
SHORTNESS OF BREATH: 0
FREQUENCY: 1
MYALGIAS: 0
PALPITATIONS: 0
HEARTBURN: 0
NAUSEA: 0
ARTHRALGIAS: 1

## 2022-10-04 NOTE — LETTER
October 5, 2022      Reggie Owens   [address] [place] [id number]        Dear ,    We are writing to inform you of your test results.    Your PSA is normal. Your cholesterol is more elevated than I would like. I will increase your Crestor to 20 mg daily. Take two until your next refill. Your glucose is mildly elevated. We will repeat in one year. The rest of your labs are fine.    Resulted Orders   PSA, screen   Result Value Ref Range    Prostate Specific Antigen Screen 3.67 0.00 - 4.50 ng/mL    Narrative    This result is obtained using the Roche Elecsys total PSA method on the mario e801 immunoassay analyzer. Results obtained with different assay methods or kits cannot be used interchangeably.   Lipid panel reflex to direct LDL Fasting   Result Value Ref Range    Cholesterol 189 <200 mg/dL    Triglycerides 98 <150 mg/dL    Direct Measure HDL 63 >=40 mg/dL    LDL Cholesterol Calculated 106 (H) <=100 mg/dL    Non HDL Cholesterol 126 <130 mg/dL    Narrative    Cholesterol  Desirable:  <200 mg/dL    Triglycerides  Normal:  Less than 150 mg/dL  Borderline High:  150-199 mg/dL  High:  200-499 mg/dL  Very High:  Greater than or equal to 500 mg/dL    Direct Measure HDL  Female:  Greater than or equal to 50 mg/dL   Male:  Greater than or equal to 40 mg/dL    LDL Cholesterol  Desirable:  <100mg/dL  Above Desirable:  100-129 mg/dL   Borderline High:  130-159 mg/dL   High:  160-189 mg/dL   Very High:  >= 190 mg/dL    Non HDL Cholesterol  Desirable:  130 mg/dL  Above Desirable:  130-159 mg/dL  Borderline High:  160-189 mg/dL  High:  190-219 mg/dL  Very High:  Greater than or equal to 220 mg/dL   Basic metabolic panel   Result Value Ref Range    Sodium 141 136 - 145 mmol/L    Potassium 4.1 3.4 - 5.3 mmol/L    Chloride 106 98 - 107 mmol/L    Carbon Dioxide (CO2) 26 22 - 29 mmol/L    Anion Gap 9 7 - 15 mmol/L    Urea Nitrogen 17.7 8.0 - 23.0 mg/dL    Creatinine 0.85 0.67 - 1.17 mg/dL    Calcium 9.4 8.8 - 10.2  mg/dL    Glucose 127 (H) 70 - 99 mg/dL    GFR Estimate >90 >60 mL/min/1.73m2      Comment:      Effective December 21, 2021 eGFRcr in adults is calculated using the 2021 CKD-EPI creatinine equation which includes age and gender (Shira et al., NEJM, DOI: 10.1056/IBFHdi7916326)   Hepatitis C Screen Reflex to HCV RNA Quant and Genotype   Result Value Ref Range    Hepatitis C Antibody Nonreactive Nonreactive    Narrative    Assay performance characteristics have not been established for newborns, infants, and children.   HIV Antigen Antibody Combo   Result Value Ref Range    HIV Antigen Antibody Combo Nonreactive Nonreactive      Comment:      HIV-1 p24 Ag & HIV-1/HIV-2 Ab Not Detected       If you have any questions or concerns, please call the clinic at the number listed above.       Sincerely,      ALIDA Henson

## 2022-10-04 NOTE — PROGRESS NOTES
SUBJECTIVE:   CC: Reggie is an 62 year old who presents for preventative health visit.       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    Healthy Habits:     Getting at least 3 servings of Calcium per day:  Yes    Bi-annual eye exam:  Yes    Dental care twice a year:  NO    Sleep apnea or symptoms of sleep apnea:  Sleep apnea    Diet:  Regular (no restrictions)    Frequency of exercise:  6-7 days/week    Duration of exercise:  Less than 15 minutes    Taking medications regularly:  Yes    Medication side effects:  None    PHQ-2 Total Score: 0    Additional concerns today:  No      Today's PHQ-2 Score:   PHQ-2 ( 1999 Pfizer) 10/4/2022   Q1: Little interest or pleasure in doing things 0   Q2: Feeling down, depressed or hopeless 0   PHQ-2 Score 0   Q1: Little interest or pleasure in doing things Not at all   Q2: Feeling down, depressed or hopeless Not at all   PHQ-2 Score 0       Abuse: Current or Past(Physical, Sexual or Emotional)- No  Do you feel safe in your environment? Yes    Have you ever done Advance Care Planning? (For example, a Health Directive, POLST, or a discussion with a medical provider or your loved ones about your wishes): Yes, patient states has an Advance Care Planning document and will bring a copy to the clinic.    Social History     Tobacco Use     Smoking status: Never Smoker     Smokeless tobacco: Former User     Types: Snuff   Substance Use Topics     Alcohol use: Yes     Comment: social         Alcohol Use 10/4/2022   Prescreen: >3 drinks/day or >7 drinks/week? Yes   Prescreen: >3 drinks/day or >7 drinks/week? -   AUDIT SCORE  7      AUDIT - Alcohol Use Disorders Identification Test - Reproduced from the World Health Organization Audit 2001 (Second Edition) 10/4/2022   1.  How often do you have a drink containing alcohol? 4 or more times a week   2.  How many drinks containing alcohol do you have on a typical day when you are drinking? 1 or 2   3.  How often do you have five or more drinks on one occasion? Weekly   4.  How often during the last year have you found that you were not able to stop drinking once you had started? Never   5.  How often during the last year have you failed to do what was normally expected of you because of drinking? Never   6.  How often during the last year have you needed a first drink in the morning to get yourself going after a heavy drinking session? Never   7.  How often during the last year have you had a feeling of guilt or remorse after drinking? Never   8.  How often during the last year have you been unable to remember what happened the night before because of your drinking? Never   9.  Have you or someone else been injured because of your drinking? No   10. Has a relative, friend, doctor or other health care worker been concerned about your drinking or suggested you cut down? No   TOTAL SCORE 7       Last PSA:   Prostate Specific Antigen Screen   Date Value Ref Range Status   10/14/2021 2.73 < OR = 4.00 ng/mL Final     Comment:     The total PSA value from this assay system is   standardized against the WHO standard. The test   result will be approximately 20% lower when compared   to the equimolar-standardized total PSA ([place]). Comparison of serial PSA results should be   interpreted with this fact in mind.     This test was performed using the Siemens   chemiluminescent method. Values obtained from   different assay methods cannot be used  interchangeably. PSA levels, regardless of  value, should not be interpreted as absolute  evidence of the presence or absence of disease.  Lab test  performed by:  Lab Mnemonic:JACOB  [facility]  [address] [place] [address]  Beto Gutierrez M.D.  QUEST Specimen received date and time: 15-OCT-2021 02:57:00.00         Reviewed orders with patient. Reviewed health maintenance and updated orders accordingly -   Lab work is in process    Reviewed and updated as needed this visit by clinical staff   Tobacco   Meds   Med Hx  Surg Hx  Fam Hx  Soc Hx          Reviewed and updated as needed this visit by Provider                       Review of Systems   Constitutional: Negative for chills and fever.   HENT: Positive for hearing loss. Negative for congestion, ear pain and sore throat.    Eyes: Negative for pain and visual disturbance.   Respiratory: Negative for cough and shortness of breath.    Cardiovascular: Positive for peripheral edema. Negative for chest pain and palpitations.   Gastrointestinal: Negative for abdominal pain, constipation, diarrhea, heartburn, hematochezia and nausea.   Genitourinary: Positive for frequency. Negative for dysuria, genital sores, hematuria, impotence, penile discharge and urgency.   Musculoskeletal: Positive for arthralgias. Negative for joint swelling and myalgias.   Skin: Positive for rash.   Neurological: Negative for dizziness, weakness, headaches and paresthesias.   Psychiatric/Behavioral: Negative for mood changes. The patient is not nervous/anxious.          OBJECTIVE:   BP (!) 158/98   Pulse 75   Wt 117 kg (258 lb)   BMI 33.58 kg/m      Physical Exam  Constitutional:       General: He is not in acute distress.     Appearance: He is well-developed.   HENT:      Right Ear: Tympanic membrane and external ear normal.      Left Ear: Tympanic membrane and external ear normal.      Nose: Nose normal.      Mouth/Throat:      Mouth: No oral lesions.      Pharynx: No oropharyngeal exudate.   Eyes:      General:         Right eye: No discharge.         Left eye: No discharge.      Conjunctiva/sclera:  Conjunctivae normal.      Pupils: Pupils are equal, round, and reactive to light.   Neck:      Thyroid: No thyromegaly.      Trachea: No tracheal deviation.   Cardiovascular:      Rate and Rhythm: Normal rate and regular rhythm.      Pulses: Normal pulses.      Heart sounds: Normal heart sounds, S1 normal and S2 normal. No murmur heard.    No S3 or S4 sounds.   Pulmonary:      Effort: Pulmonary effort is normal. No respiratory distress.      Breath sounds: Normal breath sounds. No wheezing or rales.   Abdominal:      General: Bowel sounds are normal.      Palpations: Abdomen is soft. There is no mass.      Tenderness: There is no abdominal tenderness.   Musculoskeletal:         General: No deformity. Normal range of motion.      Cervical back: Neck supple.   Lymphadenopathy:      Cervical: No cervical adenopathy.   Skin:     General: Skin is warm and dry.      Findings: Lesion present. No rash.      Comments: 3 mm AK right cheek.  Verbal informed consent treated with liquid nitrogen in a freeze thaw freeze cycle he tolerated it well there were no complications may need further treatment if this does not slough and resolve   Neurological:      Mental Status: He is alert and oriented to person, place, and time.      Motor: No abnormal muscle tone.      Deep Tendon Reflexes: Reflexes are normal and symmetric.   Psychiatric:         Speech: Speech normal.         Thought Content: Thought content normal.         Judgment: Judgment normal.               ASSESSMENT/PLAN:       ICD-10-CM    1. Chronic pain of left ankle  M25.572     G89.29    2. SANDEE on CPAP  G47.33     Z99.89    3. Dyslipidemia  E78.5 Lipid panel reflex to direct LDL Fasting     Basic metabolic panel     rosuvastatin (CRESTOR) 10 MG tablet   4. S/P ankle fusion  Z98.1    5. Encounter for long-term (current) use of medications  Z79.899 Lipid panel reflex to direct LDL Fasting     Basic metabolic panel   6. Screening for HIV (human immunodeficiency virus)   "Z11.4 HIV Antigen Antibody Combo   7. Need for hepatitis C screening test  Z11.59 Hepatitis C Screen Reflex to HCV RNA Quant and Genotype     HIV Antigen Antibody Combo   8. Annual physical exam  Z00.00 Lipid panel reflex to direct LDL Fasting     Basic metabolic panel     HIV Antigen Antibody Combo   9. Screening for prostate cancer  Z12.5 PSA, screen   Return to clinic in 1 year and as needed  AK right check, treated as above      COUNSELING:       Estimated body mass index is 33.58 kg/m  as calculated from the following:    Height as of 12/22/21: 1.867 m (6' 1.5\").    Weight as of this encounter: 117 kg (258 lb).       He reports that he has never smoked. He has quit using smokeless tobacco.  His smokeless tobacco use included snuff.      Counseling Resources:  ATP IV Guidelines  Pooled Cohorts Equation Calculator  FRAX Risk Assessment  ICSI Preventive Guidelines  Dietary Guidelines for Americans, 2010  USDA's MyPlate  ASA Prophylaxis  Lung CA Screening    ALIDA Cannon  [facility]  "

## 2022-10-05 RX ORDER — ROSUVASTATIN CALCIUM 20 MG/1
20 TABLET, COATED ORAL DAILY
Qty: 90 TABLET | Refills: 3 | Status: SHIPPED | OUTPATIENT
Start: 2022-10-05 | End: 2023-10-06

## 2022-11-02 ENCOUNTER — TELEPHONE (OUTPATIENT)
Dept: FAMILY MEDICINE | Facility: CLINIC | Age: 62
End: 2022-11-02

## 2022-11-02 NOTE — TELEPHONE ENCOUNTER
Latrice message received from patient wife:     I called patient and informed him I could get him in with a provider for a 1240 arrival time today. He says he was hoping NETTIE URIBE PA-C would just send in a Rx for antibiotic because he gets this sinus infection every year.    I scheduled an appointment for Deacon but he would like to know if NETTIE URIBE PA-C will just sent in Rx.    Please reply as soon as possible.    Elke Guevara RN

## 2022-11-02 NOTE — TELEPHONE ENCOUNTER
Patient informed of Rx and to come in if not better after completing. I cancelled the appointment that was made for today.    Patient expressed understanding and was very thankful for NETTIE URIBE PA-C for sending antibiotic.    Elke Guevara RN

## 2022-12-19 ENCOUNTER — ALLIED HEALTH/NURSE VISIT (OUTPATIENT)
Dept: FAMILY MEDICINE | Facility: CLINIC | Age: 62
End: 2022-12-19
Payer: COMMERCIAL

## 2022-12-19 DIAGNOSIS — Z23 NEED FOR VACCINATION: Primary | ICD-10-CM

## 2022-12-19 PROCEDURE — 90471 IMMUNIZATION ADMIN: CPT

## 2022-12-19 PROCEDURE — 99207 PR NO CHARGE NURSE ONLY: CPT

## 2022-12-19 PROCEDURE — 90750 HZV VACC RECOMBINANT IM: CPT

## 2023-04-11 ENCOUNTER — TELEPHONE (OUTPATIENT)
Dept: FAMILY MEDICINE | Facility: CLINIC | Age: 63
End: 2023-04-11
Payer: COMMERCIAL

## 2023-04-11 DIAGNOSIS — J01.01 ACUTE RECURRENT MAXILLARY SINUSITIS: ICD-10-CM

## 2023-04-11 NOTE — TELEPHONE ENCOUNTER
Patient calling to check on refill?  This is his 3rd call.    Corazon Gutierrez on 4/11/2023 at 11:55 AM

## 2023-04-11 NOTE — TELEPHONE ENCOUNTER
"** I told the patient that in my experience, providers don't typically just call in Rx.'s for antibiotics, but nevertheless, he insisted that I relay this message and he would like someone to call him back one way or another.  **        New Medication Request  Contacts       Type Contact Phone/Fax    04/11/2023 08:21 AM CDT Phone (Incoming) Reggie Owens (Self) [phone] (H)        What medication are you requesting?: Antibiotics     Reason for medication request: \"Kimo knows my history... I have gotten a sinus infection once or twice per year since 1998 and he has always called in a prescription for me\".      Have you taken this medication before?: Yes:     Patient offered an appointment? Yes: I indicated that typically an office visit is required before a prescription for antibiotics will be issued.    Preferred Pharmacy:  [address] [address]  Phone: [phone] Fax: [phone]      Okay to leave a detailed message?: Yes at Cell number on file:    Telephone Information:   Mobile [phone]       PT WOULD LIKE SOMEONE TO CALL HIM BACK AND LET HIM KNOW EITHER WAY IF A PRESCRIPTION CAN BE SENT IN TO [place] FOR HIM.    "

## 2023-05-03 ENCOUNTER — TELEPHONE (OUTPATIENT)
Dept: FAMILY MEDICINE | Facility: CLINIC | Age: 63
End: 2023-05-03

## 2023-05-03 ENCOUNTER — OFFICE VISIT (OUTPATIENT)
Dept: FAMILY MEDICINE | Facility: CLINIC | Age: 63
End: 2023-05-03
Payer: COMMERCIAL

## 2023-05-03 VITALS
TEMPERATURE: 101.3 F | BODY MASS INDEX: 34.68 KG/M2 | DIASTOLIC BLOOD PRESSURE: 103 MMHG | WEIGHT: 266.5 LBS | SYSTOLIC BLOOD PRESSURE: 183 MMHG | OXYGEN SATURATION: 20 % | HEART RATE: 103 BPM

## 2023-05-03 DIAGNOSIS — J01.01 ACUTE RECURRENT MAXILLARY SINUSITIS: Primary | ICD-10-CM

## 2023-05-03 PROCEDURE — U0005 INFEC AGEN DETEC AMPLI PROBE: HCPCS | Performed by: FAMILY MEDICINE

## 2023-05-03 PROCEDURE — 99213 OFFICE O/P EST LOW 20 MIN: CPT | Mod: CS | Performed by: FAMILY MEDICINE

## 2023-05-03 PROCEDURE — U0003 INFECTIOUS AGENT DETECTION BY NUCLEIC ACID (DNA OR RNA); SEVERE ACUTE RESPIRATORY SYNDROME CORONAVIRUS 2 (SARS-COV-2) (CORONAVIRUS DISEASE [COVID-19]), AMPLIFIED PROBE TECHNIQUE, MAKING USE OF HIGH THROUGHPUT TECHNOLOGIES AS DESCRIBED BY CMS-2020-01-R: HCPCS | Performed by: FAMILY MEDICINE

## 2023-05-03 RX ORDER — PREDNISONE 20 MG/1
20 TABLET ORAL DAILY
Qty: 7 TABLET | Refills: 0 | Status: SHIPPED | OUTPATIENT
Start: 2023-05-03 | End: 2023-05-10

## 2023-05-03 RX ORDER — CODEINE PHOSPHATE AND GUAIFENESIN 10; 100 MG/5ML; MG/5ML
1-2 SOLUTION ORAL EVERY 4 HOURS PRN
Qty: 118 ML | Refills: 0 | Status: SHIPPED | OUTPATIENT
Start: 2023-05-03 | End: 2023-10-10

## 2023-05-03 NOTE — TELEPHONE ENCOUNTER
General Call    Contacts       Type Contact Phone/Fax    05/03/2023 02:32 PM CDT Phone (Incoming) [address] (Pharmacy) [phone]        Reason for Call:  Pharmacy called and stated Pt was seen by Dr Kumari today and forgot to send a Rx for cough medicine to the pharmacy.     What are your questions or concerns:  Pt was seen by Dr Kumari today and forgot to send a Rx for cough medicine to the pharmacy. Pharmacy called and stated Pt is waiting at the pharmacy for the Rx. Please call Pt once the Rx has been sent.    Date of last appointment with provider: 05/03/2023    Yaneth Love

## 2023-05-03 NOTE — PROGRESS NOTES
Assessment & Plan     Acute recurrent maxillary sinusitis  Symptoms are worsening, check for COVID, treat sinusitis  - Symptomatic COVID-19 Virus (Coronavirus) by PCR Nose  - amoxicillin-clavulanate (AUGMENTIN) 875-125 MG tablet; Take 1 tablet by mouth 2 times daily for 10 days  - predniSONE (DELTASONE) 20 MG tablet; Take 1 tablet (20 mg) by mouth daily for 7 days  - guaiFENesin-codeine (ROBITUSSIN AC) 100-10 MG/5ML solution; Take 5-10 mLs by mouth every 4 hours as needed for cough                 Jass Kumari MD  [facility]    Sidney Paredes is a 63 year old, presenting for the following health issues:  Sinus Problem (Symptoms for x 3 weeks)        5/3/2023     1:32 PM   Additional Questions   Roomed by SMA Juan   Accompanied by self     Sinus Problem     Patient is here with concerns about recurrent sinusitis.  He was treated a few weeks ago.  He has a long history of sinus disease.  He is complaining of sinus congestion, low-grade fever, headache            Review of Systems   Constitutional, HEENT, cardiovascular, pulmonary, gi and gu systems are negative, except as otherwise noted.      Objective    BP (!) 183/103 (BP Location: Right arm, Patient Position: Sitting, Cuff Size: Adult Large)   Pulse 103   Temp (!) 101.3  F (38.5  C)   Wt 120.9 kg (266 lb 8 oz)   SpO2 (!) 20%   BMI 34.68 kg/m    Body mass index is 34.68 kg/m .  Physical Exam   Alert, oriented, no acute distress  Ear canals patent, TMs clear  Sinus tenderness  Throat is clear  Neck is supple without adenopathy  Lungs are clear  Heart has a regular rate and rhythm

## 2023-05-04 ENCOUNTER — TELEPHONE (OUTPATIENT)
Dept: NURSING | Facility: CLINIC | Age: 63
End: 2023-05-04
Payer: COMMERCIAL

## 2023-05-04 LAB — SARS-COV-2 RNA RESP QL NAA+PROBE: POSITIVE

## 2023-05-04 NOTE — TELEPHONE ENCOUNTER
Patient classified as COVID treatment eligible by Epic high risk algorithm:  Yes    Coronavirus (COVID-19) Notification    Reason for call  Notify of POSITIVE COVID-19 lab result, assess symptoms,  review [facility] recommendations    Lab Result   Lab test for 2019-nCoV rRt-PCR or SARS-COV-2 PCR  Oropharyngeal AND/OR nasopharyngeal swabs were POSITIVE for 2019-nCoV RNA [OR] SARS-COV-2 RNA (COVID-19) RNA     We have been unable to reach patient by phone at this time to notify of their Positive COVID-19 result.    Left voicemail message requesting a call back to [phone] [facility] for results.        A Positive COVID-19 letter will be sent via [employer] or the mail.    Carlyn Blanca

## 2023-07-03 ENCOUNTER — OFFICE VISIT (OUTPATIENT)
Dept: FAMILY MEDICINE | Facility: CLINIC | Age: 63
End: 2023-07-03
Payer: COMMERCIAL

## 2023-07-03 ENCOUNTER — ANCILLARY PROCEDURE (OUTPATIENT)
Dept: GENERAL RADIOLOGY | Facility: CLINIC | Age: 63
End: 2023-07-03
Payer: COMMERCIAL

## 2023-07-03 ENCOUNTER — TELEPHONE (OUTPATIENT)
Dept: FAMILY MEDICINE | Facility: CLINIC | Age: 63
End: 2023-07-03

## 2023-07-03 VITALS
OXYGEN SATURATION: 95 % | SYSTOLIC BLOOD PRESSURE: 168 MMHG | TEMPERATURE: 98 F | BODY MASS INDEX: 33.8 KG/M2 | DIASTOLIC BLOOD PRESSURE: 92 MMHG | HEART RATE: 75 BPM | HEIGHT: 74 IN | WEIGHT: 263.4 LBS | RESPIRATION RATE: 16 BRPM

## 2023-07-03 DIAGNOSIS — M79.672 LEFT FOOT PAIN: ICD-10-CM

## 2023-07-03 DIAGNOSIS — M79.672 LEFT FOOT PAIN: Primary | ICD-10-CM

## 2023-07-03 PROCEDURE — 73610 X-RAY EXAM OF ANKLE: CPT | Mod: TC | Performed by: RADIOLOGY

## 2023-07-03 PROCEDURE — 99213 OFFICE O/P EST LOW 20 MIN: CPT

## 2023-07-03 PROCEDURE — 73630 X-RAY EXAM OF FOOT: CPT | Mod: TC | Performed by: RADIOLOGY

## 2023-07-03 NOTE — TELEPHONE ENCOUNTER
Patient called and notified of obvious fracture and left foot.  Fracture of fourth metatarsal bone.  Patient again advised to be nonweightbearing.  Patient to go to [facility] in [place].  Patient verbalizes understanding and is on his way there now.    CLAIRE Cardoza CNP on 7/3/2023 at 12:09 PM

## 2023-07-03 NOTE — PROGRESS NOTES
"  Assessment & Plan     Left foot pain  Patient injured left foot while getting down from a skid steer yesterday.  Reports he stepped wrong.  He also reports a mild injury 2 days prior to this and that foot was a little sore prior to injury yesterday.  Patient has had 3 previous ankle surgeries on that side for fixation after fracture when he was mauled by a bull.  Patient does not have any decreased range of motion from baseline after injury, does have mild swelling of left ankle.  No bruising noted.  Patient does have some lateral discoloration around left ankle from previous injury.  Patient has walking boot at home and instructed to be minimally weightbearing until results of x-ray are returned.  Also discussed if new fracture or problems with present hardware that patient may need to be seen by orthopedics emergently.  Discussed RICE for current symptoms.  Ibuprofen and Tylenol for pain.  Patient to be notified of significant x-ray results via telephone and plan of care adjusted as needed.  - XR Ankle Left G/E 3 Views; Future  - XR Foot Left G/E 3 Views; Future             BMI:   Estimated body mass index is 34.28 kg/m  as calculated from the following:    Height as of this encounter: 1.867 m (6' 1.5\").    Weight as of this encounter: 119.5 kg (263 lb 6.4 oz).           CLAIRE Cardoza CNP  [facility]    Sidney Paredes is a 63 year old, presenting for the following health issues:  Musculoskeletal Problem (Left foot pain x 1 day)        7/3/2023    11:10 AM   Additional Questions   Roomed by EMLIY Quiroz   Accompanied by self         7/3/2023    11:10 AM   Patient Reported Additional Medications   Patient reports taking the following new medications none     Was getting out of skid  yesterday and stepped wrong, now having pain in left foot. Has had 3 fusions of left ankle, in 2016, 2017 and 2019 approximately. Original injury caused by being mauled by a bull. " "    Musculoskeletal Problem    History of Present Illness       Reason for visit:  Foot pain  Symptom onset:  1-3 days ago  Symptoms include:  Pain in left foot  Symptom intensity:  Severe  Symptom progression:  Staying the same  Had these symptoms before:  No  What makes it worse:  Weight on foot  What makes it better:  Cold pack    He eats 4 or more servings of fruits and vegetables daily.He consumes 0 sweetened beverage(s) daily.He exercises with enough effort to increase his heart rate 60 or more minutes per day.  He exercises with enough effort to increase his heart rate 7 days per week.   He is taking medications regularly.               Review of Systems   Constitutional, HEENT, cardiovascular, pulmonary, gi and gu systems are negative, except as otherwise noted.      Objective    BP (!) 168/92 (BP Location: Right arm, Patient Position: Right side, Cuff Size: Adult Large)   Pulse 75   Temp 98  F (36.7  C) (Oral)   Resp 16   Ht 1.867 m (6' 1.5\")   Wt 119.5 kg (263 lb 6.4 oz)   SpO2 95%   BMI 34.28 kg/m    Body mass index is 34.28 kg/m .  Physical Exam   GENERAL: healthy, alert and no distress  NECK: no adenopathy, no asymmetry, masses, or scars and thyroid normal to palpation  RESP: lungs clear to auscultation - no rales, rhonchi or wheezes  CV: regular rate and rhythm, normal S1 S2, no S3 or S4, no murmur, click or rub, no peripheral edema and peripheral pulses strong  ABDOMEN: soft, nontender, no hepatosplenomegaly, no masses and bowel sounds normal  MS: Mild nonpitting edema to left ankle                    "

## 2023-10-10 ENCOUNTER — OFFICE VISIT (OUTPATIENT)
Dept: FAMILY MEDICINE | Facility: CLINIC | Age: 63
End: 2023-10-10
Payer: COMMERCIAL

## 2023-10-10 VITALS
SYSTOLIC BLOOD PRESSURE: 140 MMHG | RESPIRATION RATE: 18 BRPM | DIASTOLIC BLOOD PRESSURE: 84 MMHG | BODY MASS INDEX: 33.93 KG/M2 | TEMPERATURE: 97.8 F | WEIGHT: 264.4 LBS | HEART RATE: 65 BPM | HEIGHT: 74 IN | OXYGEN SATURATION: 94 %

## 2023-10-10 DIAGNOSIS — E78.2 MIXED HYPERLIPIDEMIA: ICD-10-CM

## 2023-10-10 DIAGNOSIS — Z00.00 ANNUAL PHYSICAL EXAM: ICD-10-CM

## 2023-10-10 DIAGNOSIS — G47.33 OSA ON CPAP: ICD-10-CM

## 2023-10-10 DIAGNOSIS — Z12.5 SCREENING FOR PROSTATE CANCER: ICD-10-CM

## 2023-10-10 DIAGNOSIS — L57.0 AK (ACTINIC KERATOSIS): ICD-10-CM

## 2023-10-10 DIAGNOSIS — Z12.11 SCREEN FOR COLON CANCER: Primary | ICD-10-CM

## 2023-10-10 LAB
ANION GAP SERPL CALCULATED.3IONS-SCNC: 12 MMOL/L (ref 7–15)
BUN SERPL-MCNC: 14.5 MG/DL (ref 8–23)
CALCIUM SERPL-MCNC: 9.5 MG/DL (ref 8.8–10.2)
CHLORIDE SERPL-SCNC: 105 MMOL/L (ref 98–107)
CHOLEST SERPL-MCNC: 168 MG/DL
CREAT SERPL-MCNC: 0.87 MG/DL (ref 0.67–1.17)
DEPRECATED HCO3 PLAS-SCNC: 24 MMOL/L (ref 22–29)
EGFRCR SERPLBLD CKD-EPI 2021: >90 ML/MIN/1.73M2
GLUCOSE SERPL-MCNC: 103 MG/DL (ref 70–99)
HDLC SERPL-MCNC: 61 MG/DL
LDLC SERPL CALC-MCNC: 90 MG/DL
NONHDLC SERPL-MCNC: 107 MG/DL
POTASSIUM SERPL-SCNC: 4.2 MMOL/L (ref 3.4–5.3)
PSA SERPL DL<=0.01 NG/ML-MCNC: 3.17 NG/ML (ref 0–4.5)
SODIUM SERPL-SCNC: 141 MMOL/L (ref 135–145)
TRIGL SERPL-MCNC: 83 MG/DL

## 2023-10-10 PROCEDURE — 80061 LIPID PANEL: CPT | Performed by: PHYSICIAN ASSISTANT

## 2023-10-10 PROCEDURE — G0103 PSA SCREENING: HCPCS | Performed by: PHYSICIAN ASSISTANT

## 2023-10-10 PROCEDURE — 17000 DESTRUCT PREMALG LESION: CPT | Performed by: PHYSICIAN ASSISTANT

## 2023-10-10 PROCEDURE — 80048 BASIC METABOLIC PNL TOTAL CA: CPT | Performed by: PHYSICIAN ASSISTANT

## 2023-10-10 PROCEDURE — 36415 COLL VENOUS BLD VENIPUNCTURE: CPT | Performed by: PHYSICIAN ASSISTANT

## 2023-10-10 PROCEDURE — 99396 PREV VISIT EST AGE 40-64: CPT | Mod: 25 | Performed by: PHYSICIAN ASSISTANT

## 2023-10-10 PROCEDURE — 90471 IMMUNIZATION ADMIN: CPT | Performed by: PHYSICIAN ASSISTANT

## 2023-10-10 PROCEDURE — 90682 RIV4 VACC RECOMBINANT DNA IM: CPT | Performed by: PHYSICIAN ASSISTANT

## 2023-10-10 RX ORDER — ROSUVASTATIN CALCIUM 20 MG/1
TABLET, COATED ORAL
Qty: 90 TABLET | Refills: 3 | Status: SHIPPED | OUTPATIENT
Start: 2023-10-10 | End: 2024-10-01

## 2023-10-10 RX ORDER — ROSUVASTATIN CALCIUM 20 MG/1
TABLET, COATED ORAL
Qty: 90 TABLET | Refills: 0 | Status: SHIPPED | OUTPATIENT
Start: 2023-10-10 | End: 2023-10-10

## 2023-10-10 ASSESSMENT — ENCOUNTER SYMPTOMS
FEVER: 0
CHILLS: 0
MYALGIAS: 0
DIARRHEA: 0
PARESTHESIAS: 0
ARTHRALGIAS: 1
HEMATURIA: 0
HEMATOCHEZIA: 0
SORE THROAT: 0
WEAKNESS: 0
HEARTBURN: 0
COUGH: 0
FREQUENCY: 0
EYE PAIN: 0
PALPITATIONS: 0
JOINT SWELLING: 1
HEADACHES: 0
NERVOUS/ANXIOUS: 0
NAUSEA: 0
ABDOMINAL PAIN: 0
DIZZINESS: 0
SHORTNESS OF BREATH: 0
CONSTIPATION: 0
DYSURIA: 0

## 2023-10-10 NOTE — LETTER
October 11, 2023      Reggie Owens   [address] [place] [id number]        Dear ,    We are writing to inform you of your test results.    These results are stable.  Your lipids are well controlled.  Your glucose is better it is just mildly elevated.  Your PSA is normal.  I think the feeling in the back your throat is from postnasal drip.  It looks like you had a little formation there on exam.  I would recommend a Zyrtec or Claritin when it bothers most.  Me know if you have any questions.    Resulted Orders   BASIC METABOLIC PANEL   Result Value Ref Range    Sodium 141 135 - 145 mmol/L      Comment:      Reference intervals for this test were updated on 09/26/2023 to more accurately reflect our healthy population. There may be differences in the flagging of prior results with similar values performed with this method. Interpretation of those prior results can be made in the context of the updated reference intervals.     Potassium 4.2 3.4 - 5.3 mmol/L    Chloride 105 98 - 107 mmol/L    Carbon Dioxide (CO2) 24 22 - 29 mmol/L    Anion Gap 12 7 - 15 mmol/L    Urea Nitrogen 14.5 8.0 - 23.0 mg/dL    Creatinine 0.87 0.67 - 1.17 mg/dL    GFR Estimate >90 >60 mL/min/1.73m2    Calcium 9.5 8.8 - 10.2 mg/dL    Glucose 103 (H) 70 - 99 mg/dL   Lipid panel reflex to direct LDL Non-fasting   Result Value Ref Range    Cholesterol 168 <200 mg/dL    Triglycerides 83 <150 mg/dL    Direct Measure HDL 61 >=40 mg/dL    LDL Cholesterol Calculated 90 <=100 mg/dL    Non HDL Cholesterol 107 <130 mg/dL    Narrative    Cholesterol  Desirable:  <200 mg/dL    Triglycerides  Normal:  Less than 150 mg/dL  Borderline High:  150-199 mg/dL  High:  200-499 mg/dL  Very High:  Greater than or equal to 500 mg/dL    Direct Measure HDL  Female:  Greater than or equal to 50 mg/dL   Male:  Greater than or equal to 40 mg/dL    LDL Cholesterol  Desirable:  <100mg/dL  Above Desirable:  100-129 mg/dL   Borderline High:  130-159 mg/dL   High:   160-189 mg/dL   Very High:  >= 190 mg/dL    Non HDL Cholesterol  Desirable:  130 mg/dL  Above Desirable:  130-159 mg/dL  Borderline High:  160-189 mg/dL  High:  190-219 mg/dL  Very High:  Greater than or equal to 220 mg/dL   PSA, screen   Result Value Ref Range    Prostate Specific Antigen Screen 3.17 0.00 - 4.50 ng/mL    Narrative    This result is obtained using the Roche Elecsys total PSA method on the mario [id number] immunoassay analyzer. Results obtained with different assay methods or kits cannot be used interchangeably.       If you have any questions or concerns, please call the clinic at the number listed above.       Sincerely,      ALIDA Henson

## 2023-10-10 NOTE — PROGRESS NOTES
SUBJECTIVE:   CC: Reggie is an 63 year old who presents for preventative health visit.       10/10/2023    10:15 AM   Additional Questions   Roomed by LILI Byrd       63-year-old presents the clinic for annual exam  He has hyperlipidemia tolerates statin without difficulty  A lesion on the right cheek has been treated with liquid nitrogen previously it is a small actinic keratosis he requests retreatment  He has some questions about CBD Gummies use for arthritis at the present time he uses ibuprofen I answered questions best my ability      Healthy Habits:     Getting at least 3 servings of Calcium per day:  Yes    Bi-annual eye exam:  Yes    Dental care twice a year:  NO    Sleep apnea or symptoms of sleep apnea:  Sleep apnea    Diet:  Regular (no restrictions)    Frequency of exercise:  6-7 days/week    Duration of exercise:  Less than 15 minutes    Additional concerns today:  No          Cologuard done on this date: 2/20/2021 (approximately), by this group: [employer], results were Negative .               Social History     Tobacco Use    Smoking status: Never     Passive exposure: Never    Smokeless tobacco: Former     Types: Snuff   Substance Use Topics    Alcohol use: Yes     Comment: social             10/10/2023    10:02 AM   Alcohol Use   Prescreen: >3 drinks/day or >7 drinks/week? No       Last PSA:   Prostate Specific Antigen Screen   Date Value Ref Range Status   10/04/2022 3.67 0.00 - 4.50 ng/mL Final   10/14/2021 2.73 < OR = 4.00 ng/mL Final     Comment:     The total PSA value from this assay system is   standardized against the WHO standard. The test   result will be approximately 20% lower when compared   to the equimolar-standardized total PSA ([place]   [place]). Comparison of serial PSA results should be   interpreted with this fact in mind.     This test was performed using the Siemens   chemiluminescent method. Values obtained from   different assay methods cannot be  "used  interchangeably. PSA levels, regardless of  value, should not be interpreted as absolute  evidence of the presence or absence of disease.  Lab test performed by:  Lab Mnemonic:JACOB  [facility]  [address] [place] [address]  Beto Gutierrez M.D.  [facility] Specimen received date and time: 15-OCT-2021 02:57:00.00         Reviewed orders with patient. Reviewed health maintenance and updated orders accordingly - Yes  Lab work is in process    Reviewed and updated as needed this visit by clinical staff   Tobacco  Allergies  Meds              Reviewed and updated as needed this visit by Provider                     Review of Systems   Constitutional:  Negative for chills and fever.   HENT:  Positive for hearing loss. Negative for congestion, ear pain and sore throat.    Eyes:  Negative for pain and visual disturbance.   Respiratory:  Negative for cough and shortness of breath.    Cardiovascular:  Positive for peripheral edema. Negative for chest pain and palpitations.   Gastrointestinal:  Negative for abdominal pain, constipation, diarrhea, heartburn, hematochezia and nausea.   Genitourinary:  Negative for dysuria, frequency, genital sores, hematuria, impotence, penile discharge and urgency.   Musculoskeletal:  Positive for arthralgias and joint swelling. Negative for myalgias.   Skin:  Negative for rash.   Neurological:  Negative for dizziness, weakness, headaches and paresthesias.   Psychiatric/Behavioral:  Negative for mood changes. The patient is not nervous/anxious.          OBJECTIVE:   BP (!) 140/84 (BP Location: Right arm, Patient Position: Sitting, Cuff Size: Adult Large)   Pulse 65   Temp 97.8  F (36.6  C) (Tympanic)   Resp 18   Ht 1.867 m (6' 1.5\")   Wt 119.9 kg (264 lb 6.4 oz)   SpO2 94%   BMI 34.41 kg/m      Physical Exam  Vitals and nursing note reviewed.   Constitutional:       Appearance: Normal appearance.   HENT:      Head: Normocephalic and atraumatic.      " Right Ear: Tympanic membrane normal.      Left Ear: Tympanic membrane normal.      Nose: Nose normal. No congestion or rhinorrhea.      Mouth/Throat:      Mouth: Mucous membranes are moist.   Eyes:      Conjunctiva/sclera: Conjunctivae normal.   Cardiovascular:      Rate and Rhythm: Normal rate and regular rhythm.      Heart sounds: Normal heart sounds.   Pulmonary:      Effort: Pulmonary effort is normal.      Breath sounds: Normal breath sounds.   Abdominal:      General: Abdomen is flat. Bowel sounds are normal.      Palpations: There is no mass.      Tenderness: There is no abdominal tenderness.   Musculoskeletal:         General: Normal range of motion.      Cervical back: Normal range of motion and neck supple.      Right lower leg: No edema.      Left lower leg: No edema.   Lymphadenopathy:      Cervical: No cervical adenopathy.   Skin:     General: Skin is warm and dry.      Comments: 3 mm erythematous scaly lesion right cheek verbal informed consent carried out treated with liquid nitrogen in a freeze thaw freeze cycle he tolerated well there were no complications should be replaced by normal skin in 3 to 4 weeks if not we will recheck   Neurological:      General: No focal deficit present.   Psychiatric:         Mood and Affect: Mood normal.         Behavior: Behavior normal.         Thought Content: Thought content normal.         Judgment: Judgment normal.               ASSESSMENT/PLAN:   (Z12.11) Screen for colon cancer  (primary encounter diagnosis)  Comment: Colcarlos will be sent for abstracting  Plan:     (E78.2) Mixed hyperlipidemia  Comment: Labs pending  Plan: BASIC METABOLIC PANEL, Lipid panel reflex to         direct LDL Non-fasting, PSA, screen,         rosuvastatin (CRESTOR) 20 MG tablet,         DISCONTINUED: rosuvastatin (CRESTOR) 20 MG         tablet            (Z00.00) Annual physical exam  Comment: Return in 1 year and as needed  Plan: BASIC METABOLIC PANEL            (Z12.5) Screening  "for prostate cancer  Comment: He has a pending PSA  Plan: PSA, screen            (G47.33) SANDEE on CPAP  Comment: Stable  Plan:     (L57.0) AK (actinic keratosis)  Comment: Treated as an body of note  Plan:           COUNSELING:         BMI:   Estimated body mass index is 34.41 kg/m  as calculated from the following:    Height as of this encounter: 1.867 m (6' 1.5\").    Weight as of this encounter: 119.9 kg (264 lb 6.4 oz).         He reports that he has never smoked. He has never been exposed to tobacco smoke. He has quit using smokeless tobacco.  His smokeless tobacco use included snuff.            ALIDA Cannon  [facility]  "

## 2024-02-23 ENCOUNTER — ORDERS ONLY (AUTO-RELEASED) (OUTPATIENT)
Dept: FAMILY MEDICINE | Facility: CLINIC | Age: 64
End: 2024-02-23

## 2024-02-23 DIAGNOSIS — Z12.11 SCREEN FOR COLON CANCER: ICD-10-CM

## 2024-02-23 DIAGNOSIS — Z12.11 SCREEN FOR COLON CANCER: Primary | ICD-10-CM

## 2024-03-21 LAB — NONINV COLON CA DNA+OCC BLD SCRN STL QL: NEGATIVE

## 2024-04-26 ENCOUNTER — OFFICE VISIT (OUTPATIENT)
Dept: FAMILY MEDICINE | Facility: CLINIC | Age: 64
End: 2024-04-26
Payer: COMMERCIAL

## 2024-04-26 VITALS
BODY MASS INDEX: 34.2 KG/M2 | HEIGHT: 74 IN | DIASTOLIC BLOOD PRESSURE: 86 MMHG | RESPIRATION RATE: 18 BRPM | OXYGEN SATURATION: 96 % | SYSTOLIC BLOOD PRESSURE: 138 MMHG | HEART RATE: 74 BPM | WEIGHT: 266.5 LBS | TEMPERATURE: 97.7 F

## 2024-04-26 DIAGNOSIS — J01.01 ACUTE RECURRENT MAXILLARY SINUSITIS: Primary | ICD-10-CM

## 2024-04-26 PROCEDURE — 99213 OFFICE O/P EST LOW 20 MIN: CPT | Performed by: PHYSICIAN ASSISTANT

## 2024-04-26 RX ORDER — CODEINE PHOSPHATE AND GUAIFENESIN 10; 100 MG/5ML; MG/5ML
1-2 SOLUTION ORAL EVERY 6 HOURS PRN
Qty: 120 ML | Refills: 0 | Status: SHIPPED | OUTPATIENT
Start: 2024-04-26 | End: 2024-05-28

## 2024-04-26 NOTE — PROGRESS NOTES
"  Assessment & Plan     (J01.01) Acute recurrent maxillary sinusitis  (primary encounter diagnosis)  Comment: Worsening  Plan: amoxicillin-clavulanate (AUGMENTIN) 875-125 MG         tablet, guaiFENesin-codeine (ROBITUSSIN AC)         100-10 MG/5ML solution        For the Augmentin fluids he should slowly improve not acutely worsen or recheck did refill his cough syrup with codeine for nighttime use            BMI  Estimated body mass index is 34.68 kg/m  as calculated from the following:    Height as of this encounter: 1.867 m (6' 1.5\").    Weight as of this encounter: 120.9 kg (266 lb 8 oz).   Provide Augmentin fluid    Sidney Paredes is a 63 year old, presenting for the following health issues:  Sinus Problem        4/26/2024     9:55 AM   Additional Questions   Roomed by LILI Byrd     Patient presents the clinic with sinus pain pressure congestion fullness minimal cough no fever no chills  Gets sinusitis about once a year    History of Present Illness       Reason for visit:  Sinus infection  Symptom onset:  3-7 days ago    He eats 0-1 servings of fruits and vegetables daily.He consumes 0 sweetened beverage(s) daily.He exercises with enough effort to increase his heart rate 20 to 29 minutes per day.    He is taking medications regularly.                     Objective    /86 (BP Location: Right arm, Patient Position: Sitting, Cuff Size: Adult Large)   Pulse 74   Temp 97.7  F (36.5  C) (Tympanic)   Resp 18   Ht 1.867 m (6' 1.5\")   Wt 120.9 kg (266 lb 8 oz)   SpO2 96%   BMI 34.68 kg/m    Body mass index is 34.68 kg/m .  Physical Exam alert attentive no acute distress  Ears canals and drums normal  Conjunctiva pink  Nasal mucosa is inflamed and congested purulent rhinorrhea  Benign  Neck supple no adenopathy  Lungs clear ventilated              Signed Electronically by: ALIDA Cannon    "

## 2024-05-28 ENCOUNTER — OFFICE VISIT (OUTPATIENT)
Dept: FAMILY MEDICINE | Facility: CLINIC | Age: 64
End: 2024-05-28
Payer: COMMERCIAL

## 2024-05-28 VITALS
RESPIRATION RATE: 18 BRPM | WEIGHT: 266.4 LBS | HEIGHT: 74 IN | BODY MASS INDEX: 34.19 KG/M2 | OXYGEN SATURATION: 97 % | SYSTOLIC BLOOD PRESSURE: 136 MMHG | DIASTOLIC BLOOD PRESSURE: 82 MMHG | HEART RATE: 70 BPM | TEMPERATURE: 97.8 F

## 2024-05-28 DIAGNOSIS — J01.01 ACUTE RECURRENT MAXILLARY SINUSITIS: Primary | ICD-10-CM

## 2024-05-28 DIAGNOSIS — K42.9 UMBILICAL HERNIA WITHOUT OBSTRUCTION AND WITHOUT GANGRENE: ICD-10-CM

## 2024-05-28 PROCEDURE — 99213 OFFICE O/P EST LOW 20 MIN: CPT | Performed by: PHYSICIAN ASSISTANT

## 2024-05-28 RX ORDER — DOXYCYCLINE HYCLATE 100 MG
100 TABLET ORAL 2 TIMES DAILY
Qty: 20 TABLET | Refills: 0 | Status: SHIPPED | OUTPATIENT
Start: 2024-05-28 | End: 2024-09-19

## 2024-05-28 NOTE — PROGRESS NOTES
"  Assessment & Plan     (J01.01) Acute recurrent maxillary sinusitis  (primary encounter diagnosis)  Comment: Continue  Plan: doxycycline hyclate (VIBRA-TABS) 100 MG tablet        Will switch to doxycycline he should clear not worsen or recheck    (K42.9) Umbilical hernia without obstruction and without gangrene  Comment: Present  Plan: General surgery referral          BMI  Estimated body mass index is 34.67 kg/m  as calculated from the following:    Height as of this encounter: 1.867 m (6' 1.5\").    Weight as of this encounter: 120.8 kg (266 lb 6.4 oz).             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  He exercises with enough effort to increase his heart rate 7 days per week.   He is taking medications regularly.                     Objective    /82 (BP Location: Right arm, Patient Position: Sitting, Cuff Size: Adult Large)   Pulse 70   Temp 97.8  F (36.6  C) (Tympanic)   Resp 18   Ht 1.867 m (6' 1.5\")   Wt 120.8 kg (266 lb 6.4 oz)   SpO2 97%   BMI 34.67 kg/m  "   Body mass index is 34.67 kg/m .  Physical Exam alert attentive no acute distress nasal mucosa is inflamed congested  Oropharynx mild injection no exudate  Neck supple no adenopathy  Lungs clear ventilated  Cardiovascular regular rate and rhythm  He has a reducible but mildly tender umbilical hernia              Signed Electronically by: ALIDA Cannon

## 2024-05-31 NOTE — PROGRESS NOTES
HPI:  This is a 64 year old male here today with concerns of pain and bulging in his umbilicus. He has noted this for the past 2 years. The symptoms have progressed and increased over this time. This is reducible.  He has never had obstructive symptoms.  Main complaint is pain with strenuous activity.  He comes in for evaluation secondary to the hernia causing enough issues to bother them with daily activities or chores.    PMH:  Past Medical History:   Diagnosis Date    Obese     SANDEE on CPAP 9/6/2017    Other chronic pain     Sleep apnea      PSH:  Past Surgical History:   Procedure Laterality Date    ANKLE ARTHRODESIS Left 09/06/2017    ANKLE SURGERY Left 07/01/2016    ARTHRODESIS ANKLE Left 11/09/2020    Procedure: REVISION LEFT ANKLE FUSION WITH;  Surgeon: Damaris Panda MD;  Location:  OR    ARTHRODESIS ANKLE Left 09/06/2017    Procedure: LEFT ANKLE FUSION;  Surgeon: Gil Rasmussen DPM;  Location: [facility] OR;  Service:     ARTHROPLASTY KNEE  12/30/2019    AS CT COLONOGRAPHY, SCREENING  02/17/2021    COLONOSCOPY  01/20/2011    ENT SURGERY      T+A    EXCHANGE POLY COMPONENT ARTHROPLASTY KNEE Right 12/01/2021    Procedure: RIGHT KNEE UNICOMPARTMENTAL REVISION WITH POLYETHYLENE EXCHANGE;  Surgeon: Eloy Wade MD;  Location: [facility] OR    MANDIBLE FRACTURE SURGERY      ORTHOPEDIC SURGERY      rt knee replacement,lt ankle X2,, facial reconstruction    OSTEOTOMY ANKLE Left 11/09/2020    Procedure: CORRECTIVE OSTEOTOMY;  Surgeon: Damaris Panda MD;  Location:  OR    RECONSTRUCTION MID-FACE  1998    facial bones    REMOVE HARDWARE LOWER EXTREMITY Left 09/06/2017    Procedure: HARDWARE REMOVAL;  Surgeon: Gil Rasmussen DPM;  Location: [facility] OR;  Service:     TONSILLECTOMY & ADENOIDECTOMY  1963    TOTAL KNEE ARTHROPLASTY Right     ZZC FUSION FOOT BONES,SUBTALAR Left 07/01/2016    Procedure: Left Ankle Open Debridement Left Subtalar Fusion, Peroneal  "Debridement and Tenodeses, Augment Graft;  Surgeon: Rochelle Verduzco MD;  Location: [facility] OR;  Service: Orthopedics     No intra-abdominal surgeries    Current meds:  Statin  No anticoagulation.    Allergies:  Allergies:Patient has no known allergies.    Family history:  Family History   Problem Relation Age of Onset    Breast Cancer Mother          at age: 78 years    Throat cancer Father          at  age: 61 years     No family history of problems with bleeding/anesthesia.    Social history   reports that he has never smoked. He has never been exposed to tobacco smoke. He has quit using smokeless tobacco.  His smokeless tobacco use included snuff. He reports current alcohol use. He reports that he does not use drugs.  1-2 beers/day  Works as   Here today alone    Review of Systems - Negative except for that noted in HPI.    Vitals:    24 1003   Weight: 120 kg (264 lb 8 oz)   Height: 1.867 m (6' 1.5\")       Body mass index is 34.42 kg/m .    EXAM:  General: NAD   HEENT: normocephalic, PERRLA and EOMS intact  Mounth: Mucus membranes moist  Neck: Supple  Chest: NLB  CV: RRR  ABD: Soft nontender and nondistended, reducible umbilical hernia   EXT: Warm, pulses intact,   Neuro: Alert and oriented x3    IMAGES:     None    Assessment/Plan: Pt with a symptomatic umbilical hernia. I discussed this at length with him.  I went over conservative management as well as surgical treatment of this.  I have ordered an US to document the size of hernia and better determine open vs robotic approach I would plan on doing this via likely open approach with possible use of mesh. I went over the risks of surgery including but not limited to bleeding and infection, anesthesia, recurrence rates and nerve injury. I discussed the expected recovery time as well. 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  He'll call us when he is ready to " schedule.  Discussed warning signs of incarceration and when to present.     Blayne Ken MD

## 2024-06-03 ENCOUNTER — OFFICE VISIT (OUTPATIENT)
Dept: SURGERY | Facility: CLINIC | Age: 64
End: 2024-06-03
Attending: PHYSICIAN ASSISTANT
Payer: MEDICARE

## 2024-06-03 ENCOUNTER — HOSPITAL ENCOUNTER (OUTPATIENT)
Dept: ULTRASOUND IMAGING | Facility: HOSPITAL | Age: 64
Discharge: HOME | End: 2024-06-03
Attending: SURGERY | Admitting: SURGERY
Payer: MEDICARE

## 2024-06-03 VITALS — BODY MASS INDEX: 33.95 KG/M2 | HEIGHT: 74 IN | WEIGHT: 264.5 LBS

## 2024-06-03 DIAGNOSIS — K42.9 UMBILICAL HERNIA WITHOUT OBSTRUCTION AND WITHOUT GANGRENE: ICD-10-CM

## 2024-06-03 PROCEDURE — 76705 ECHO EXAM OF ABDOMEN: CPT

## 2024-06-03 PROCEDURE — 99204 OFFICE O/P NEW MOD 45 MIN: CPT | Performed by: SURGERY

## 2024-06-03 NOTE — LETTER
6/3/2024         RE: Reggie Owens   [address] [place] [id number]        Dear Colleague,    Thank you for referring your patient, Reggie Owens, to the [facility] AND [facility] [place]. Please see a copy of my visit note below.          HPI:  This is a 64 year old male here today with concerns of pain and bulging in his umbilicus. He has noted this for the past 2 years. The symptoms have progressed and increased over this time. This is reducible.  He has never had obstructive symptoms.  Main complaint is pain with strenuous activity.  He comes in for evaluation secondary to the hernia causing enough issues to bother them with daily activities or chores.    PMH:  Past Medical History:   Diagnosis Date     Obese      SANDEE on CPAP 9/6/2017     Other chronic pain      Sleep apnea      PSH:  Past Surgical History:   Procedure Laterality Date     ANKLE ARTHRODESIS Left 09/06/2017     ANKLE SURGERY Left 07/01/2016     ARTHRODESIS ANKLE Left 11/09/2020    Procedure: REVISION LEFT ANKLE FUSION WITH;  Surgeon: Damaris Panda MD;  Location:  OR     ARTHRODESIS ANKLE Left 09/06/2017    Procedure: LEFT ANKLE FUSION;  Surgeon: Gil Rasmussen DPM;  Location: [facility];  Service:      ARTHROPLASTY KNEE  12/30/2019     AS CT COLONOGRAPHY, SCREENING  02/17/2021     COLONOSCOPY  01/20/2011     ENT SURGERY      T+A     EXCHANGE POLY COMPONENT ARTHROPLASTY KNEE Right 12/01/2021    Procedure: RIGHT KNEE UNICOMPARTMENTAL REVISION WITH POLYETHYLENE EXCHANGE;  Surgeon: Eloy Wade MD;  Location: [facility] OR     MANDIBLE FRACTURE SURGERY       ORTHOPEDIC SURGERY      rt knee replacement,lt ankle X2,, facial reconstruction     OSTEOTOMY ANKLE Left 11/09/2020    Procedure: CORRECTIVE OSTEOTOMY;  Surgeon: Damaris Panda MD;  Location:  OR     RECONSTRUCTION MID-FACE  1998    facial bones     REMOVE HARDWARE LOWER EXTREMITY Left 09/06/2017    Procedure:  "HARDWARE REMOVAL;  Surgeon: Gil Rasmussen DPM;  Location: [facility];  Service:      TONSILLECTOMY & ADENOIDECTOMY  1963     TOTAL KNEE ARTHROPLASTY Right      ZZC FUSION FOOT BONES,SUBTALAR Left 2016    Procedure: Left Ankle Open Debridement Left Subtalar Fusion, Peroneal Debridement and Tenodeses, Augment Graft;  Surgeon: Rochelle Verduzco MD;  Location: [facility];  Service: Orthopedics     No intra-abdominal surgeries    Current meds:  Statin  No anticoagulation.    Allergies:  Allergies:Patient has no known allergies.    Family history:  Family History   Problem Relation Age of Onset     Breast Cancer Mother          at age: 78 years     Throat cancer Father          at  age: 61 years     No family history of problems with bleeding/anesthesia.    Social history   reports that he has never smoked. He has never been exposed to tobacco smoke. He has quit using smokeless tobacco.  His smokeless tobacco use included snuff. He reports current alcohol use. He reports that he does not use drugs.  1-2 beers/day  Works as   Here today alone    Review of Systems - Negative except for that noted in HPI.    Vitals:    24 1003   Weight: 120 kg (264 lb 8 oz)   Height: 1.867 m (6' 1.5\")       Body mass index is 34.42 kg/m .    EXAM:  General: NAD   HEENT: normocephalic, PERRLA and EOMS intact  Mounth: Mucus membranes moist  Neck: Supple  Chest: NLB  CV: RRR  ABD: Soft nontender and nondistended, reducible umbilical hernia   EXT: Warm, pulses intact,   Neuro: Alert and oriented x3    IMAGES:     None    Assessment/Plan: Pt with a symptomatic umbilical hernia. I discussed this at length with him.  I went over conservative management as well as surgical treatment of this.  I have ordered an US to document the size of hernia and better determine open vs robotic approach I would plan on doing this via likely open approach with possible use of mesh. I went over the risks " of surgery including but not limited to bleeding and infection, anesthesia, recurrence rates and nerve injury. I discussed the expected recovery time as well. 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  He'll call us when he is ready to schedule.  Discussed warning signs of incarceration and when to present.     Blayne Ken MD            Again, thank you for allowing me to participate in the care of your patient.        Sincerely,        Blayne Ken MD

## 2024-09-09 ENCOUNTER — TRANSFERRED RECORDS (OUTPATIENT)
Dept: HEALTH INFORMATION MANAGEMENT | Facility: CLINIC | Age: 64
End: 2024-09-09
Payer: MEDICARE

## 2024-09-10 DIAGNOSIS — M81.0 OSTEOPOROSIS: Primary | ICD-10-CM

## 2024-09-11 ENCOUNTER — LAB (OUTPATIENT)
Dept: LAB | Facility: CLINIC | Age: 64
End: 2024-09-11
Payer: MEDICARE

## 2024-09-11 DIAGNOSIS — E78.2 MIXED HYPERLIPIDEMIA: ICD-10-CM

## 2024-09-11 DIAGNOSIS — M81.0 OSTEOPOROSIS: ICD-10-CM

## 2024-09-11 DIAGNOSIS — Z13.220 SCREENING FOR HYPERLIPIDEMIA: Primary | ICD-10-CM

## 2024-09-11 LAB
ALP SERPL-CCNC: 31 U/L (ref 40–150)
ANION GAP SERPL CALCULATED.3IONS-SCNC: 12 MMOL/L (ref 7–15)
BUN SERPL-MCNC: 21.3 MG/DL (ref 8–23)
CALCIUM SERPL-MCNC: 9.5 MG/DL (ref 8.8–10.4)
CALCIUM SERPL-MCNC: 9.5 MG/DL (ref 8.8–10.4)
CHLORIDE SERPL-SCNC: 106 MMOL/L (ref 98–107)
CHOLEST SERPL-MCNC: 163 MG/DL
CREAT SERPL-MCNC: 0.92 MG/DL (ref 0.67–1.17)
EGFRCR SERPLBLD CKD-EPI 2021: >90 ML/MIN/1.73M2
FASTING STATUS PATIENT QL REPORTED: NO
FASTING STATUS PATIENT QL REPORTED: NO
GLUCOSE SERPL-MCNC: 89 MG/DL (ref 70–99)
HCO3 SERPL-SCNC: 25 MMOL/L (ref 22–29)
HDLC SERPL-MCNC: 54 MG/DL
LDLC SERPL CALC-MCNC: 82 MG/DL
NONHDLC SERPL-MCNC: 109 MG/DL
POTASSIUM SERPL-SCNC: 4.8 MMOL/L (ref 3.4–5.3)
PTH-INTACT SERPL-MCNC: 18 PG/ML (ref 15–65)
SODIUM SERPL-SCNC: 143 MMOL/L (ref 135–145)
TRIGL SERPL-MCNC: 136 MG/DL
VIT D+METAB SERPL-MCNC: 32 NG/ML (ref 20–50)

## 2024-09-11 PROCEDURE — 80048 BASIC METABOLIC PNL TOTAL CA: CPT

## 2024-09-11 PROCEDURE — 84075 ASSAY ALKALINE PHOSPHATASE: CPT

## 2024-09-11 PROCEDURE — 83970 ASSAY OF PARATHORMONE: CPT

## 2024-09-11 PROCEDURE — 36415 COLL VENOUS BLD VENIPUNCTURE: CPT

## 2024-09-11 PROCEDURE — 80061 LIPID PANEL: CPT

## 2024-09-11 PROCEDURE — 82306 VITAMIN D 25 HYDROXY: CPT

## 2024-09-19 ENCOUNTER — OFFICE VISIT (OUTPATIENT)
Dept: FAMILY MEDICINE | Facility: CLINIC | Age: 64
End: 2024-09-19
Payer: MEDICARE

## 2024-09-19 VITALS
HEART RATE: 72 BPM | BODY MASS INDEX: 34.27 KG/M2 | SYSTOLIC BLOOD PRESSURE: 138 MMHG | OXYGEN SATURATION: 96 % | TEMPERATURE: 97.9 F | HEIGHT: 74 IN | WEIGHT: 267 LBS | DIASTOLIC BLOOD PRESSURE: 94 MMHG | RESPIRATION RATE: 20 BRPM

## 2024-09-19 DIAGNOSIS — J01.01 ACUTE RECURRENT MAXILLARY SINUSITIS: Primary | ICD-10-CM

## 2024-09-19 DIAGNOSIS — T48.5X5A RHINITIS MEDICAMENTOSA: ICD-10-CM

## 2024-09-19 DIAGNOSIS — J31.0 RHINITIS MEDICAMENTOSA: ICD-10-CM

## 2024-09-19 PROCEDURE — 99213 OFFICE O/P EST LOW 20 MIN: CPT

## 2024-09-19 RX ORDER — FLUTICASONE PROPIONATE 50 MCG
1 SPRAY, SUSPENSION (ML) NASAL DAILY
Qty: 11.1 ML | Refills: 1 | Status: SHIPPED | OUTPATIENT
Start: 2024-09-19 | End: 2024-10-07

## 2024-09-19 RX ORDER — OXYCODONE HYDROCHLORIDE 5 MG/1
5-10 TABLET ORAL
COMMUNITY
Start: 2024-09-02 | End: 2024-09-24

## 2024-09-19 ASSESSMENT — ENCOUNTER SYMPTOMS: COUGH: 1

## 2024-09-19 NOTE — PATIENT INSTRUCTIONS
Stop using Oxymetazoline and use fluticasone nasal spray for 5 days, then use nasal saline spray as needed for congestion.    Frequent use of oxymetazoline can cause rhinitis medicamentosa
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
## 2024-09-24 ENCOUNTER — MYC MEDICAL ADVICE (OUTPATIENT)
Dept: FAMILY MEDICINE | Facility: CLINIC | Age: 64
End: 2024-09-24
Payer: MEDICARE

## 2024-09-24 DIAGNOSIS — J01.01 ACUTE RECURRENT MAXILLARY SINUSITIS: Primary | ICD-10-CM

## 2024-09-24 RX ORDER — CEFDINIR 300 MG/1
300 CAPSULE ORAL 2 TIMES DAILY
Qty: 20 CAPSULE | Refills: 0 | Status: SHIPPED | OUTPATIENT
Start: 2024-09-24 | End: 2024-10-07

## 2024-09-24 NOTE — TELEPHONE ENCOUNTER
9-24-24      General Call    Reason for Call:  pt called back, I read him the below msg.  Per pt his wife (mrn# [phone]) had an appt 9-24 w/ Elsie and during wife visit she asked elsie about Deacon getting augmentin on 9-19 and deacon wasn't doing any better, Elsie advised then if deacon wasn't feeling better on 9-24 to call him (elsie) and he will call in something stronger.  Pt uses [facility]  [place]

## 2024-09-24 NOTE — TELEPHONE ENCOUNTER
RN call to patient, left message to clarify request. Replied in MyChart message.     OV with [facility]CS 9/19

## 2024-09-24 NOTE — TELEPHONE ENCOUNTER
Routing to Kimo Benitez to advise on different antibiotic as discussed at wife's visit yesterday with Kimo, 9/23/24.

## 2024-10-01 DIAGNOSIS — E78.2 MIXED HYPERLIPIDEMIA: ICD-10-CM

## 2024-10-01 RX ORDER — ROSUVASTATIN CALCIUM 20 MG/1
TABLET, COATED ORAL
Qty: 90 TABLET | Refills: 2 | Status: SHIPPED | OUTPATIENT
Start: 2024-10-01 | End: 2024-10-07

## 2024-10-05 ENCOUNTER — HEALTH MAINTENANCE LETTER (OUTPATIENT)
Age: 64
End: 2024-10-05

## 2024-10-07 ENCOUNTER — OFFICE VISIT (OUTPATIENT)
Dept: FAMILY MEDICINE | Facility: CLINIC | Age: 64
End: 2024-10-07
Payer: MEDICARE

## 2024-10-07 VITALS
HEIGHT: 74 IN | OXYGEN SATURATION: 97 % | TEMPERATURE: 97.6 F | WEIGHT: 270 LBS | SYSTOLIC BLOOD PRESSURE: 144 MMHG | HEART RATE: 69 BPM | DIASTOLIC BLOOD PRESSURE: 94 MMHG | BODY MASS INDEX: 34.65 KG/M2 | RESPIRATION RATE: 18 BRPM

## 2024-10-07 DIAGNOSIS — E78.2 MIXED HYPERLIPIDEMIA: ICD-10-CM

## 2024-10-07 DIAGNOSIS — Z12.5 SCREENING FOR PROSTATE CANCER: ICD-10-CM

## 2024-10-07 DIAGNOSIS — J01.01 ACUTE RECURRENT MAXILLARY SINUSITIS: ICD-10-CM

## 2024-10-07 DIAGNOSIS — L98.9 SKIN LESION: Primary | ICD-10-CM

## 2024-10-07 DIAGNOSIS — G47.33 OSA (OBSTRUCTIVE SLEEP APNEA): ICD-10-CM

## 2024-10-07 PROBLEM — E66.01 CLASS 2 SEVERE OBESITY DUE TO EXCESS CALORIES WITH SERIOUS COMORBIDITY IN ADULT (H): Status: ACTIVE | Noted: 2024-10-07

## 2024-10-07 PROBLEM — E66.812 CLASS 2 SEVERE OBESITY DUE TO EXCESS CALORIES WITH SERIOUS COMORBIDITY IN ADULT (H): Status: ACTIVE | Noted: 2024-10-07

## 2024-10-07 LAB — PSA SERPL DL<=0.01 NG/ML-MCNC: 2.91 NG/ML (ref 0–4.5)

## 2024-10-07 PROCEDURE — 36415 COLL VENOUS BLD VENIPUNCTURE: CPT | Performed by: PHYSICIAN ASSISTANT

## 2024-10-07 PROCEDURE — 99213 OFFICE O/P EST LOW 20 MIN: CPT | Performed by: PHYSICIAN ASSISTANT

## 2024-10-07 PROCEDURE — G0103 PSA SCREENING: HCPCS | Performed by: PHYSICIAN ASSISTANT

## 2024-10-07 RX ORDER — DOXYCYCLINE HYCLATE 100 MG
100 TABLET ORAL 2 TIMES DAILY
Qty: 20 TABLET | Refills: 0 | Status: SHIPPED | OUTPATIENT
Start: 2024-10-07

## 2024-10-07 RX ORDER — PREDNISONE 20 MG/1
20 TABLET ORAL DAILY
Qty: 5 TABLET | Refills: 0 | Status: SHIPPED | OUTPATIENT
Start: 2024-10-07

## 2024-10-07 RX ORDER — ROSUVASTATIN CALCIUM 20 MG/1
TABLET, COATED ORAL
Qty: 90 TABLET | Refills: 3 | Status: SHIPPED | OUTPATIENT
Start: 2024-10-07

## 2024-10-07 NOTE — PROGRESS NOTES
"  Assessment & Plan     (L98.9) Skin lesion  (primary encounter diagnosis)  Comment: Refer to Derm  Plan: Adult Dermatology  Referral            (J01.01) Acute recurrent maxillary sinusitis  Comment: Doxycycline and prednisone  Plan: doxycycline hyclate (VIBRA-TABS) 100 MG tablet,        predniSONE (DELTASONE) 20 MG tablet        Should slowly improve not acutely worsen or recheck    (Z12.5) Screening for prostate cancer  Comment: PSA pending  Plan: PSA, screen            (E78.2) Mixed hyperlipidemia  Comment: Labs were acceptable  Plan: rosuvastatin (CRESTOR) 20 MG tablet        Renew meds x 1 year    (G47.33, 327.23) SANDEE      BMI  Estimated body mass index is 35.14 kg/m  as calculated from the following:    Height as of this encounter: 1.867 m (6' 1.5\").    Weight as of this encounter: 122.5 kg (270 lb).             Sidney Worthy is a 64 year old, presenting for the following health issues:  Follow Up (Sinus with ongoing cough after 2 rounds of antibiotics  )        10/7/2024     9:45 AM   Additional Questions   Roomed by LILI Byrd     Patient here for ongoing sinus pressure congestion.  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  Patient benefits from his CPAP therapy and is compliant in its use he is due for new CPAP supplies.    History of Present Illness       Reason for visit:  Sinus infection    He eats 2-3 servings of fruits and vegetables daily.He consumes 0 sweetened beverage(s) daily.He " Detail Level: Detailed "exercises with enough effort to increase his heart rate 30 to 60 minutes per day.  He exercises with enough effort to increase his heart rate 7 days per week.   He is taking medications regularly.                     Objective    BP (!) 146/94 (BP Location: Right arm, Patient Position: Sitting, Cuff Size: Adult Large)   Pulse 69   Temp 97.6  F (36.4  C) (Tympanic)   Resp 18   Ht 1.867 m (6' 1.5\")   Wt 122.5 kg (270 lb)   SpO2 97%   BMI 35.14 kg/m    Body mass index is 35.14 kg/m .  Physical Exam alert attentive no acute distress he is afebrile  His blood pressure is elevated  Ears canals and drums normal  Conjunctiva pink  Nasal mucosa inflamed congested purulent rhinorrhea noted  Oropharynx mild injection no exudate  Neck supple no adenopathy  Lungs clear well ventilated no wheeze rales or rhonchi  Cardiovascular rate and rhythm no murmurs noted  Scaly crusty lesion right cheek              Signed Electronically by: ALIDA Cannon    " Quality 130: Documentation Of Current Medications In The Medical Record: Current Medications Documented

## 2024-10-10 ENCOUNTER — TRANSFERRED RECORDS (OUTPATIENT)
Dept: HEALTH INFORMATION MANAGEMENT | Facility: CLINIC | Age: 64
End: 2024-10-10
Payer: MEDICARE

## 2024-11-07 ENCOUNTER — TRANSFERRED RECORDS (OUTPATIENT)
Dept: HEALTH INFORMATION MANAGEMENT | Facility: CLINIC | Age: 64
End: 2024-11-07
Payer: MEDICARE

## 2024-11-09 ENCOUNTER — IMMUNIZATION (OUTPATIENT)
Dept: FAMILY MEDICINE | Facility: CLINIC | Age: 64
End: 2024-11-09
Payer: MEDICARE

## 2024-11-09 DIAGNOSIS — Z23 ENCOUNTER FOR IMMUNIZATION: Primary | ICD-10-CM

## 2024-11-09 PROCEDURE — 99207 PR NO CHARGE NURSE ONLY: CPT

## 2024-11-09 PROCEDURE — 90662 IIV NO PRSV INCREASED AG IM: CPT

## 2024-11-09 PROCEDURE — G0008 ADMIN INFLUENZA VIRUS VAC: HCPCS

## 2024-11-09 NOTE — PROGRESS NOTES
Prior to immunization administration, verified patients identity using patient s name and date of birth. Please see Immunization Activity for additional information.     Is the patient's temperature normal (100.5 or less)? Yes     Patient MEETS CRITERIA. PROCEED with vaccine administration.          11/9/2024   INFLUENZA   Would you like to receive the flu shot or the nasal flu vaccine today? Flu Shot   Have you had a serious reaction to a flu vaccine or something in a flu vaccine? No   Have you had Guillain-[place] syndrome within 6 weeks of getting a vaccine? No   Have you received a bone marrow transplant within the previous 6 months? No            Patient MEETS CRITERIA. PROCEED with vaccine administration.        Patient instructed to remain in clinic for 15 minutes afterwards, and to report any adverse reactions.      Link to Ancillary Visit Immunization Standing Orders SmartSet     Screening performed by Dafne Bocanegra MA on 11/9/2024 at 9:50 AM.

## 2025-03-03 RX ORDER — KETOCONAZOLE 20 MG/G
CREAM TOPICAL
COMMUNITY
Start: 2024-11-12 | End: 2025-03-04

## 2025-03-04 ENCOUNTER — OFFICE VISIT (OUTPATIENT)
Dept: FAMILY MEDICINE | Facility: CLINIC | Age: 65
End: 2025-03-04
Payer: MEDICARE

## 2025-03-04 VITALS
TEMPERATURE: 98.1 F | BODY MASS INDEX: 34.18 KG/M2 | DIASTOLIC BLOOD PRESSURE: 90 MMHG | HEIGHT: 74 IN | WEIGHT: 266.3 LBS | SYSTOLIC BLOOD PRESSURE: 156 MMHG | HEART RATE: 72 BPM | RESPIRATION RATE: 16 BRPM | OXYGEN SATURATION: 97 %

## 2025-03-04 DIAGNOSIS — E66.01 CLASS 2 SEVERE OBESITY DUE TO EXCESS CALORIES WITH SERIOUS COMORBIDITY IN ADULT, UNSPECIFIED BMI (H): ICD-10-CM

## 2025-03-04 DIAGNOSIS — E66.812 CLASS 2 SEVERE OBESITY DUE TO EXCESS CALORIES WITH SERIOUS COMORBIDITY IN ADULT, UNSPECIFIED BMI (H): ICD-10-CM

## 2025-03-04 DIAGNOSIS — N41.0 ACUTE PROSTATITIS: Primary | ICD-10-CM

## 2025-03-04 LAB
ALBUMIN UR-MCNC: NEGATIVE MG/DL
APPEARANCE UR: CLEAR
BILIRUB UR QL STRIP: NEGATIVE
COLOR UR AUTO: YELLOW
GLUCOSE UR STRIP-MCNC: NEGATIVE MG/DL
HGB UR QL STRIP: NEGATIVE
KETONES UR STRIP-MCNC: NEGATIVE MG/DL
LEUKOCYTE ESTERASE UR QL STRIP: NEGATIVE
NITRATE UR QL: NEGATIVE
PH UR STRIP: 5.5 [PH] (ref 5–7)
SP GR UR STRIP: 1.02 (ref 1–1.03)
UROBILINOGEN UR STRIP-ACNC: 0.2 E.U./DL

## 2025-03-04 PROCEDURE — 3080F DIAST BP >= 90 MM HG: CPT | Performed by: PHYSICIAN ASSISTANT

## 2025-03-04 PROCEDURE — G0009 ADMIN PNEUMOCOCCAL VACCINE: HCPCS | Performed by: PHYSICIAN ASSISTANT

## 2025-03-04 PROCEDURE — G2211 COMPLEX E/M VISIT ADD ON: HCPCS | Performed by: PHYSICIAN ASSISTANT

## 2025-03-04 PROCEDURE — 90677 PCV20 VACCINE IM: CPT | Performed by: PHYSICIAN ASSISTANT

## 2025-03-04 PROCEDURE — 3077F SYST BP >= 140 MM HG: CPT | Performed by: PHYSICIAN ASSISTANT

## 2025-03-04 PROCEDURE — 81003 URINALYSIS AUTO W/O SCOPE: CPT | Mod: QW | Performed by: PHYSICIAN ASSISTANT

## 2025-03-04 PROCEDURE — 99213 OFFICE O/P EST LOW 20 MIN: CPT | Performed by: PHYSICIAN ASSISTANT

## 2025-03-04 RX ORDER — SULFAMETHOXAZOLE AND TRIMETHOPRIM 800; 160 MG/1; MG/1
1 TABLET ORAL 2 TIMES DAILY
Qty: 42 TABLET | Refills: 0 | Status: SHIPPED | OUTPATIENT
Start: 2025-03-04 | End: 2025-03-25

## 2025-03-04 NOTE — PROGRESS NOTES
"  Assessment & Plan     (N41.0) Acute prostatitis  (primary encounter diagnosis)  Comment: Treat for prostatitis  Plan: UA Macroscopic with reflex to Microscopic and         Culture - Clinic Collect,         sulfamethoxazole-trimethoprim (BACTRIM DS)         800-160 MG tablet        Follow-up as needed urine pending is not repeating PSA with current infection recheck in 2 to 3 months   it has always been normal here    (E66.812,  E66.01) Class 2 severe obesity due to excess calories with serious comorbidity in adult, unspecified BMI (H)  Comment: Stable  Plan: He has a very active lifestyle work on diet modification          BMI  Estimated body mass index is 34.66 kg/m  as calculated from the following:    Height as of this encounter: 1.867 m (6' 1.5\").    Weight as of this encounter: 120.8 kg (266 lb 4.8 oz).             Sidney Worthy is a 64 year old, presenting for the following health issues: would like to have his \"prostate checked\", is having some discomfort similar to when he had a \"prostate infection\" urinating often, not painful though  Prostate Check and Elevated PSA        3/4/2025     1:58 PM   Additional Questions   Roomed by lou morataya   Accompanied by self     64-year-old (clinic with complaint of a pressure sensation when he sits.  He does not have dysuria  He does have frequent urination during the day that has not changed he feels as though he never completely empties his bladder he has nocturia x 1 or 2 at night which is stable  No fever no chills  No flank pain  Recently had a blood draw for life insurance had a mildly elevated PSA he thinks it was 4.4  He has 1 history of prostatitis about 25 years ago      History of Present Illness       Reason for visit:  Prostate check  Symptom onset:  1-2 weeks ago  Symptoms include:  Pain while sitting down  Symptom intensity:  Mild  Symptom progression:  Staying the same  Had these symptoms before:  Yes  Has tried/received treatment for these symptoms: " " Yes  Previous treatment was successful:  Yes  Prior treatment description:  Antibiotics  What makes it worse:  No  What makes it better:  No   He is taking medications regularly.                  Objective    BP (!) 156/90 (BP Location: Right arm, Patient Position: Sitting)   Pulse 72   Temp 98.1  F (36.7  C)   Resp 16   Ht 1.867 m (6' 1.5\")   Wt 120.8 kg (266 lb 4.8 oz)   SpO2 97%   BMI 34.66 kg/m    Body mass index is 34.66 kg/m .  Physical Exam alert attentive no acute distress  No CVA tenderness  Abdomen soft nontender no palpable mass and megaly  No inguinal tenderness no hernias noted  Prostate is 1-1 and half plus enlarged slightly boggy nontender              Signed Electronically by: ALIDA Cannon    "

## 2025-03-31 ENCOUNTER — OFFICE VISIT (OUTPATIENT)
Dept: FAMILY MEDICINE | Facility: CLINIC | Age: 65
End: 2025-03-31
Payer: MEDICARE

## 2025-03-31 VITALS
TEMPERATURE: 97.5 F | HEART RATE: 67 BPM | OXYGEN SATURATION: 97 % | SYSTOLIC BLOOD PRESSURE: 144 MMHG | BODY MASS INDEX: 34.33 KG/M2 | RESPIRATION RATE: 18 BRPM | WEIGHT: 267.5 LBS | DIASTOLIC BLOOD PRESSURE: 86 MMHG | HEIGHT: 74 IN

## 2025-03-31 DIAGNOSIS — N41.0 PROSTATITIS, ACUTE: Primary | ICD-10-CM

## 2025-03-31 PROCEDURE — 3077F SYST BP >= 140 MM HG: CPT | Performed by: PHYSICIAN ASSISTANT

## 2025-03-31 PROCEDURE — 99213 OFFICE O/P EST LOW 20 MIN: CPT | Performed by: PHYSICIAN ASSISTANT

## 2025-03-31 PROCEDURE — 3079F DIAST BP 80-89 MM HG: CPT | Performed by: PHYSICIAN ASSISTANT

## 2025-03-31 RX ORDER — IBUPROFEN 200 MG
200 TABLET ORAL EVERY 4 HOURS PRN
COMMUNITY

## 2025-03-31 RX ORDER — CIPROFLOXACIN 500 MG/1
500 TABLET, FILM COATED ORAL 2 TIMES DAILY
Qty: 42 TABLET | Refills: 0 | Status: SHIPPED | OUTPATIENT
Start: 2025-03-31

## 2025-03-31 NOTE — PROGRESS NOTES
"  Assessment & Plan     (N41.0) Prostatitis, acute  (primary encounter diagnosis)  Comment: Persistent  Plan: ciprofloxacin (CIPRO) 500 MG tablet        Will switch to ciprofloxacin he will MyChart me the end of next week if not improved will get a urology  If we get his symptoms cleared I do want to repeat the PSA          BMI  Estimated body mass index is 34.81 kg/m  as calculated from the following:    Height as of this encounter: 1.867 m (6' 1.5\").    Weight as of this encounter: 121.3 kg (267 lb 8 oz).             Sidney Worthy is a 64 year old, presenting for the following health issues:  Follow Up (Seen on 3/4/25 for Acute prostatitis, still not getting better )        3/31/2025    10:30 AM   Additional Questions   Roomed by LILI Byrd     64-year-old (clinic with complaint of ongoing urinary symptoms  He states it still feels like he has pressure in the rectum.  He did finish his 3-week course of Septra  No fever no chills  He does have some urgency of urination no incontinence no dysuria  Frequent amounts of blood small volume  He has had prostatitis 1 time previously  Again noted he had a mildly elevated PSA and some life insurance physical  I told him I do want repeat a PSA but if he has an infection it may be falsely elevated  No fever or chills    History of Present Illness       Reason for visit:  Prostate    He eats 2-3 servings of fruits and vegetables daily.He consumes 0 sweetened beverage(s) daily.He exercises with enough effort to increase his heart rate 30 to 60 minutes per day.  He exercises with enough effort to increase his heart rate 4 days per week.   He is taking medications regularly.                    Objective    BP (!) 144/86 (BP Location: Right arm, Patient Position: Sitting, Cuff Size: Adult Large)   Pulse 67   Temp 97.5  F (36.4  C) (Tympanic)   Resp 18   Ht 1.867 m (6' 1.5\")   Wt 121.3 kg (267 lb 8 oz)   SpO2 97%   BMI 34.81 kg/m    Body mass index is 34.81 " kg/m .  Physical Exam alert attentive no acute distress  Prostate is about 1-2+ enlarged somewhat boggy not exquisitely tender              Signed Electronically by: ALIDA Cannon

## 2025-04-09 DIAGNOSIS — N41.0 ACUTE PROSTATITIS: Primary | ICD-10-CM

## 2025-04-28 ENCOUNTER — OFFICE VISIT (OUTPATIENT)
Dept: UROLOGY | Facility: CLINIC | Age: 65
End: 2025-04-28
Attending: PHYSICIAN ASSISTANT
Payer: MEDICARE

## 2025-04-28 VITALS
SYSTOLIC BLOOD PRESSURE: 180 MMHG | BODY MASS INDEX: 34.49 KG/M2 | OXYGEN SATURATION: 95 % | HEART RATE: 73 BPM | WEIGHT: 265 LBS | DIASTOLIC BLOOD PRESSURE: 103 MMHG

## 2025-04-28 DIAGNOSIS — R03.0 ELEVATED BLOOD PRESSURE READING: Primary | ICD-10-CM

## 2025-04-28 DIAGNOSIS — R35.0 BENIGN PROSTATIC HYPERPLASIA WITH URINARY FREQUENCY: ICD-10-CM

## 2025-04-28 DIAGNOSIS — R97.20 ELEVATED PROSTATE SPECIFIC ANTIGEN (PSA): ICD-10-CM

## 2025-04-28 DIAGNOSIS — R10.2 PELVIC PAIN IN MALE: ICD-10-CM

## 2025-04-28 DIAGNOSIS — N41.0 ACUTE PROSTATITIS: ICD-10-CM

## 2025-04-28 DIAGNOSIS — N40.1 BENIGN PROSTATIC HYPERPLASIA WITH URINARY FREQUENCY: ICD-10-CM

## 2025-04-28 LAB
ALBUMIN UR-MCNC: NEGATIVE MG/DL
APPEARANCE UR: CLEAR
BACTERIA #/AREA URNS HPF: ABNORMAL /HPF
BILIRUB UR QL STRIP: NEGATIVE
COLOR UR AUTO: YELLOW
GLUCOSE UR STRIP-MCNC: NEGATIVE MG/DL
HGB UR QL STRIP: ABNORMAL
KETONES UR STRIP-MCNC: NEGATIVE MG/DL
LEUKOCYTE ESTERASE UR QL STRIP: NEGATIVE
NITRATE UR QL: NEGATIVE
PH UR STRIP: 6 [PH] (ref 5–7)
PSA FREE MFR SERPL: 24.02 %
PSA FREE SERPL-MCNC: 0.8 NG/ML
PSA SERPL DL<=0.01 NG/ML-MCNC: 3.33 NG/ML (ref 0–4.5)
RBC #/AREA URNS AUTO: ABNORMAL /HPF
SP GR UR STRIP: 1.01 (ref 1–1.03)
UROBILINOGEN UR STRIP-ACNC: 0.2 E.U./DL
WBC #/AREA URNS AUTO: ABNORMAL /HPF

## 2025-04-28 PROCEDURE — 36415 COLL VENOUS BLD VENIPUNCTURE: CPT | Performed by: MASSAGE THERAPIST

## 2025-04-28 PROCEDURE — 99204 OFFICE O/P NEW MOD 45 MIN: CPT | Mod: 25 | Performed by: MASSAGE THERAPIST

## 2025-04-28 PROCEDURE — 84154 ASSAY OF PSA FREE: CPT | Performed by: MASSAGE THERAPIST

## 2025-04-28 PROCEDURE — 81001 URINALYSIS AUTO W/SCOPE: CPT | Performed by: MASSAGE THERAPIST

## 2025-04-28 PROCEDURE — 3077F SYST BP >= 140 MM HG: CPT | Performed by: MASSAGE THERAPIST

## 2025-04-28 PROCEDURE — 51798 US URINE CAPACITY MEASURE: CPT | Performed by: MASSAGE THERAPIST

## 2025-04-28 PROCEDURE — 3080F DIAST BP >= 90 MM HG: CPT | Performed by: MASSAGE THERAPIST

## 2025-04-28 PROCEDURE — 84153 ASSAY OF PSA TOTAL: CPT | Performed by: MASSAGE THERAPIST

## 2025-04-28 RX ORDER — TAMSULOSIN HYDROCHLORIDE 0.4 MG/1
0.4 CAPSULE ORAL DAILY
Qty: 90 CAPSULE | Refills: 3 | Status: SHIPPED | OUTPATIENT
Start: 2025-04-28

## 2025-04-28 NOTE — PATIENT INSTRUCTIONS
"Raheem Owens, it was nice to meet you!    Thank you for allowing us the privilege of caring for you. We hope we provided you with the excellent service you deserve.   Please let us know if there is anything else we can do for you.  We want you to be completely satisfied with your care experience.    To ensure the quality of our services, you may be receiving a patient satisfaction survey from an independent patient satisfaction monitoring company.    The greatest compliment you can give is a \"Likely to Recommend.\"    Your visit was with CLAIRE Enciso CNP today.    Instructions per today's visit:     Based on our discussion, I have outlined the following instructions for you:    - Keep taking ibuprofen for pain, or you can switch to Aleve if you prefer.Consider wearing snug underwear to give you more support.  - If your PSA levels are elevated after the test, you might need an MRI scan of the prostate.  - Start taking tamsulosin to help with your urinary symptoms, this can cause some dizziness but usualy improves after several days.  - Please reach out Friday or early next week to let me know how you are feeling.  - Work on moderation of fluids    Thank you again for your visit, and we look forward to supporting you in your journey to better health.        ___________________________________________________________________________  Important contact and scheduling information:  Please call our contact center at [phone] to schedule your next appointments or to reach our nurse triage line.  Please call during clinic hours Monday through Friday 8:00a - 4:30p if you have questions.  You can contact us anytime via [employer] and we will reply during clinic hours.    Lab results will be communicated through My Chart or letter (if My Chart not used). Please call the clinic if you have not received communication after 1 week or if you have any " "questions.?  __________________________________________________________________________    If labs were ordered today:    Please make an appointment to have them drawn at your convenience.     To schedule the Lab Appointment using [employer]:  Select \"Schedule an Appointment\"  Select \"Lab Only\"  Answer simple questions about where you would like to be seen and your type of insurance  For \"Which locations work for you?, select the location and set up the appointment.      "

## 2025-04-28 NOTE — PROGRESS NOTES
S: Reggie Owesn is a pleasant 65 year old male requested to be seen by Kimo Benitez MD for recent prostatitis.     History of Present Illness-  Deacon Owens, 65-year-old male    - Symptoms began in January with elevated PSA levels discovered during a life insurance physical.  - Experienced pain starting in March, described as feeling like being kicked between the legs, primarily in the left lower groin area.  - History of prostate infection approximately 25 years ago, treated with antibiotics.  - Recent treatment included Bactrim for 21 days followed by Cipro 500 mg for another 21 days, with no resolution of symptoms.  - Reports frequent urination, including nocturia (getting up at least once or twice at night).  - No pain with urination, but experiences a weak stream and frequent urge to urinate shortly after voiding.  - No fever or chills reported.  - No diarrhea experienced during antibiotic treatment, but stools have been harder than normal.  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    Lab Results   Component Value Date    PSA 2.91 10/07/2024    PSA 3.17 10/10/2023    PSA 3.67 10/04/2022    PSA 2.73 10/14/2021   Reports that 1/2025- 4.4 during insurance physical.     AUA:  22 Quality 4      Current Outpatient Medications   Medication Sig Dispense Refill    ibuprofen (ADVIL/MOTRIN) 200 MG tablet Take 200 mg by mouth every 4 hours as needed for pain. Taking 1200mg daily      multivitamin w/minerals (MULTI-VITAMIN) tablet Take 1 tablet by mouth daily      rosuvastatin (CRESTOR) 20 MG tablet TAKE ONE TABLET (20 MG) BY MOUTH DAILY 90 tablet 3    tamsulosin (FLOMAX) 0.4 MG capsule Take 1 capsule (0.4 mg) by mouth daily. 90 capsule 3     No Known Allergies  Past Medical History:   Diagnosis Date    Hearing aid worn     Obese     SANDEE on CPAP 09/06/2017    Other chronic pain     Sleep apnea      Past Surgical History:   Procedure Laterality Date     ANKLE ARTHRODESIS Left 2017    ANKLE SURGERY Left 2016    ARTHRODESIS ANKLE Left 2020    Procedure: REVISION LEFT ANKLE FUSION WITH;  Surgeon: Damaris Panda MD;  Location:  OR    ARTHRODESIS ANKLE Left 2017    Procedure: LEFT ANKLE FUSION;  Surgeon: Gil Rasmussen DPM;  Location: [facility] Main OR;  Service:     ARTHROPLASTY KNEE  2019    AS CT COLONOGRAPHY, SCREENING  2021    COLONOSCOPY  2011    ENT SURGERY      T+A    EXCHANGE POLY COMPONENT ARTHROPLASTY KNEE Right 2021    Procedure: RIGHT KNEE UNICOMPARTMENTAL REVISION WITH POLYETHYLENE EXCHANGE;  Surgeon: Eloy Wade MD;  Location: [facility] OR    MANDIBLE FRACTURE SURGERY      ORTHOPEDIC SURGERY      rt knee replacement,lt ankle X2,, facial reconstruction    OSTEOTOMY ANKLE Left 2020    Procedure: CORRECTIVE OSTEOTOMY;  Surgeon: Damaris Panda MD;  Location:  OR    RECONSTRUCTION MID-FACE  1998    facial bones    REMOVE HARDWARE LOWER EXTREMITY Left 2017    Procedure: HARDWARE REMOVAL;  Surgeon: Gil Rasmussen DPM;  Location: [facility] OR;  Service:     TONSILLECTOMY & ADENOIDECTOMY  1963    TOTAL KNEE ARTHROPLASTY Right     ZZC FUSION FOOT BONES,SUBTALAR Left 2016    Procedure: Left Ankle Open Debridement Left Subtalar Fusion, Peroneal Debridement and Tenodeses, Augment Graft;  Surgeon: Rochelle Verduzco MD;  Location: [facility] OR;  Service: Orthopedics      Family History   Problem Relation Age of Onset    Breast Cancer Mother          at age: 78 years    Throat cancer Father          at  age: 61 years     Social History     Socioeconomic History    Marital status:      Spouse name: None    Number of children: None    Years of education: None    Highest education level: None   Tobacco Use    Smoking status: Never     Passive exposure: Never    Smokeless tobacco: Former     Types: Snuff   Vaping Use    Vaping status:  Never Used   Substance and Sexual Activity    Alcohol use: Yes     Comment: social    Drug use: Never     Social Drivers of Health     Financial Resource Strain: Low Risk  (10/10/2023)    Financial Resource Strain     Within the past 12 months, have you or your family members you live with been unable to get utilities (heat, electricity) when it was really needed?: No   Food Insecurity: Low Risk  (10/10/2023)    Food Insecurity     Within the past 12 months, did you worry that your food would run out before you got money to buy more?: No     Within the past 12 months, did the food you bought just not last and you didn t have money to get more?: No   Transportation Needs: Low Risk  (10/10/2023)    Transportation Needs     Within the past 12 months, has lack of transportation kept you from medical appointments, getting your medicines, non-medical meetings or appointments, work, or from getting things that you need?: No   Interpersonal Safety: Low Risk  (3/4/2025)    Interpersonal Safety     Do you feel physically and emotionally safe where you currently live?: Yes     Within the past 12 months, have you been hit, slapped, kicked or otherwise physically hurt by someone?: No     Within the past 12 months, have you been humiliated or emotionally abused in other ways by your partner or ex-partner?: No   Housing Stability: Low Risk  (10/10/2023)    Housing Stability     Do you have housing? : Yes     Are you worried about losing your housing?: No       REVIEW OF SYSTEMS  =================  C: NEGATIVE for fever, chills, change in weight  I: NEGATIVE for worrisome rashes, moles or lesions  E/M: NEGATIVE for ear, mouth and throat problems  R: NEGATIVE for significant cough or SHORTNESS OF BREATH  CV:  NEGATIVE for chest pain, palpitations or peripheral edema  GI: NEGATIVE for nausea, abdominal pain, heartburn, or change in bowel habits  NEURO: NEGATIVE numbness/weakness  : see HPI  PSYCH: NEGATIVE  depression/anxiety  LYmph: no new enlarged lymph nodes  Ortho: no new trauma/movements    Results  - Bladder scan: 34 ccs, normal  - Urinalysis: crystal clear  - PSA level in January: 4.4    Physical Exam:  BP (!) 180/103   Pulse 73   Wt 120.2 kg (265 lb)   SpO2 95%   BMI 34.49 kg/m     Patient is pleasant, in no acute distress, good general condition.  Heart:  negative, PMI normal  Lung: no evidence of respiratory distress    Abdomen: Soft, nondistended, non tender. No masses. No rebound or guarding.  :   NACHO:     Normal rectal tone, No amount of stool in the rectal vault.     50 g+ sized prostate, No tenderness, mass or asymmetry.    Skin: Warm and dry.  No redness.  Neuro: grossly normal  Musculaskeletal: moving all extremities  Psych normal mood and affect  Musculoskeletal  moving all extremities  Hematologic/Lymphatic/Immunologic: normal ant/post cervical, axillary, supraclavicular and inguinal nodes    Assessment/Plan:   Assessment & Plan  Acute prostatitis:  - Uncertain if there is an active infection; previous antibiotic treatment may have resolved any infection.  - Repeat PSA test to monitor levels. Consider MRI if PSA remains elevated. Referral to pelvic floor physical therapy. Continue ibuprofen or switch to Aleve for pain management.    Elevated prostate specific antigen (PSA):  - PSA may be elevated due to enlarged prostate or other factors.  - Repeat PSA test with a more sensitive assay. Consider MRI if PSA remains elevated.    Benign prostatic hyperplasia with urinary frequency:  - Enlarged prostate causing urinary frequency and nocturia.  - Start tamsulosin to alleviate urinary symptoms. Monitor symptoms and adjust treatment as needed.  - Risks and side effects: Potential dizziness when starting tamsulosin, retrograde ejaculation.    Pelvic pain in male:  - Likely musculoskeletal in origin, possibly related to physical activity and posture.  - Referral to pelvic floor physical therapy. Consider  snug underwear for support. Continue ibuprofen or switch to Aleve for pain management.     Deacon to follow up with Primary Care provider regarding elevated blood pressure.    CLAIRE Enciso CNP    Consent was obtained from the patient to use an AI documentation tool in the creation of  this note.  Voice recognition software was also used.  There may be associated transcribing errors.

## 2025-05-21 ENCOUNTER — OFFICE VISIT (OUTPATIENT)
Dept: FAMILY MEDICINE | Facility: CLINIC | Age: 65
End: 2025-05-21
Payer: MEDICARE

## 2025-05-21 VITALS
OXYGEN SATURATION: 97 % | DIASTOLIC BLOOD PRESSURE: 90 MMHG | HEIGHT: 74 IN | HEART RATE: 76 BPM | TEMPERATURE: 97.1 F | WEIGHT: 265 LBS | SYSTOLIC BLOOD PRESSURE: 160 MMHG | BODY MASS INDEX: 34.01 KG/M2 | RESPIRATION RATE: 18 BRPM

## 2025-05-21 DIAGNOSIS — E66.812 CLASS 2 SEVERE OBESITY DUE TO EXCESS CALORIES WITH SERIOUS COMORBIDITY IN ADULT, UNSPECIFIED BMI (H): ICD-10-CM

## 2025-05-21 DIAGNOSIS — M25.512 ACUTE PAIN OF LEFT SHOULDER: ICD-10-CM

## 2025-05-21 DIAGNOSIS — I10 PRIMARY HYPERTENSION: Primary | ICD-10-CM

## 2025-05-21 DIAGNOSIS — E66.01 CLASS 2 SEVERE OBESITY DUE TO EXCESS CALORIES WITH SERIOUS COMORBIDITY IN ADULT, UNSPECIFIED BMI (H): ICD-10-CM

## 2025-05-21 PROCEDURE — 99214 OFFICE O/P EST MOD 30 MIN: CPT | Performed by: PHYSICIAN ASSISTANT

## 2025-05-21 PROCEDURE — 3080F DIAST BP >= 90 MM HG: CPT | Performed by: PHYSICIAN ASSISTANT

## 2025-05-21 PROCEDURE — 3077F SYST BP >= 140 MM HG: CPT | Performed by: PHYSICIAN ASSISTANT

## 2025-05-21 RX ORDER — LISINOPRIL 20 MG/1
20 TABLET ORAL DAILY
Qty: 94 TABLET | Refills: 3 | Status: SHIPPED | OUTPATIENT
Start: 2025-05-21

## 2025-05-21 NOTE — PROGRESS NOTES
"  Assessment & Plan     (I10) Primary hypertension  (primary encounter diagnosis)  Comment: Persistent elevation  Plan: lisinopril (ZESTRIL) 20 MG tablet        Start lisinopril 10 mg once daily    (M25.512) Acute pain of left shoulder  Comment: Improved  Plan: Suspect rotator cuff strain continue to improve if not we will reassess    (E66.812,  E66.01) Class 2 severe obesity due to excess calories with serious comorbidity in adult, unspecified BMI (H)  Comment: Stable  Plan: Continue being active          BMI  Estimated body mass index is 34.49 kg/m  as calculated from the following:    Height as of this encounter: 1.867 m (6' 1.5\").    Weight as of this encounter: 120.2 kg (265 lb).             Sidney Worthy is a 65 year old, presenting for the following health issues:  Shoulder (Left shoulder pain after a recent fall ), Hypertension (Discuss readings ), and Follow Up (Recent Urology visit )        5/21/2025    11:45 AM   Additional Questions   Roomed by LILI Byrd     65-year-old here for a couple concerns #1 last few visits if blood pressures have been borderline here at recent visit to urologist it was more than borderline was elevated  He has been doing home blood pressure readings for the last couple of weeks and they are consistently elevated systolics in the 170s diastolics in the 90s to low 100s  Earlier this week he was walking around the bed stumbled and fell against a dresser he was having left shoulder pain it feels better today he did have some decreased range of motion he has not had past history of injury to that shoulder  He has no other injuries  He has noticed a significant decrease in his urologic symptoms since starting the tamsulosin    History of Present Illness       Reason for visit:  BP issues and left shoulder  Symptom onset:  3-7 days ago  Symptoms include:  Can't lift left arm very high  Symptom intensity:  Moderate  Symptom progression:  Staying the same  Had these symptoms " "before:  No   He is taking medications regularly.                      Objective    BP (!) 160/90 (BP Location: Right arm, Patient Position: Sitting, Cuff Size: Adult Large)   Pulse 76   Temp 97.1  F (36.2  C) (Tympanic)   Resp 18   Ht 1.867 m (6' 1.5\")   Wt 120.2 kg (265 lb)   SpO2 97%   BMI 34.49 kg/m    Body mass index is 34.49 kg/m .  Physical Exam blood pressure is elevated  He has some mild tenderness lateral aspect of the left shoulder he has good flexion extension abduction abduction and internal rotation  His strength is well-maintained  Intact radial pulse  His lungs are clear well ventilated  Cardiovascular regular rate rhythm              Signed Electronically by: ALIDA Cannon    "

## 2025-06-16 ENCOUNTER — MYC MEDICAL ADVICE (OUTPATIENT)
Dept: FAMILY MEDICINE | Facility: CLINIC | Age: 65
End: 2025-06-16
Payer: MEDICARE

## 2025-06-16 DIAGNOSIS — R03.0 ELEVATED BLOOD PRESSURE READING WITHOUT DIAGNOSIS OF HYPERTENSION: Primary | ICD-10-CM

## 2025-06-16 RX ORDER — HYDROCHLOROTHIAZIDE 12.5 MG/1
12.5 TABLET ORAL DAILY
Qty: 30 TABLET | Refills: 1 | Status: SHIPPED | OUTPATIENT
Start: 2025-06-16

## 2025-06-20 ENCOUNTER — TRANSFERRED RECORDS (OUTPATIENT)
Dept: HEALTH INFORMATION MANAGEMENT | Facility: CLINIC | Age: 65
End: 2025-06-20
Payer: MEDICARE

## 2025-06-30 ENCOUNTER — OFFICE VISIT (OUTPATIENT)
Dept: FAMILY MEDICINE | Facility: CLINIC | Age: 65
End: 2025-06-30
Payer: MEDICARE

## 2025-06-30 VITALS
HEIGHT: 74 IN | TEMPERATURE: 98.1 F | WEIGHT: 261.4 LBS | BODY MASS INDEX: 33.55 KG/M2 | DIASTOLIC BLOOD PRESSURE: 82 MMHG | SYSTOLIC BLOOD PRESSURE: 130 MMHG | RESPIRATION RATE: 18 BRPM | OXYGEN SATURATION: 95 % | HEART RATE: 75 BPM

## 2025-06-30 DIAGNOSIS — I10 ESSENTIAL HYPERTENSION: Primary | ICD-10-CM

## 2025-06-30 DIAGNOSIS — M25.562 ACUTE PAIN OF LEFT KNEE: ICD-10-CM

## 2025-06-30 DIAGNOSIS — Z01.818 PRE-OP EXAM: ICD-10-CM

## 2025-06-30 DIAGNOSIS — G47.33 OSA ON CPAP: ICD-10-CM

## 2025-06-30 PROBLEM — R03.0 ELEVATED BLOOD PRESSURE READING WITHOUT DIAGNOSIS OF HYPERTENSION: Status: RESOLVED | Noted: 2017-09-06 | Resolved: 2025-06-30

## 2025-06-30 LAB
ANION GAP SERPL CALCULATED.3IONS-SCNC: 11 MMOL/L (ref 7–15)
BUN SERPL-MCNC: 26.5 MG/DL (ref 8–23)
CALCIUM SERPL-MCNC: 9.3 MG/DL (ref 8.8–10.4)
CHLORIDE SERPL-SCNC: 103 MMOL/L (ref 98–107)
CREAT SERPL-MCNC: 1.13 MG/DL (ref 0.67–1.17)
EGFRCR SERPLBLD CKD-EPI 2021: 72 ML/MIN/1.73M2
ERYTHROCYTE [DISTWIDTH] IN BLOOD BY AUTOMATED COUNT: 12.6 % (ref 10–15)
GLUCOSE SERPL-MCNC: 102 MG/DL (ref 70–99)
HCO3 SERPL-SCNC: 26 MMOL/L (ref 22–29)
HCT VFR BLD AUTO: 42.6 % (ref 40–53)
HGB BLD-MCNC: 14.5 G/DL (ref 13.3–17.7)
MCH RBC QN AUTO: 31.4 PG (ref 26.5–33)
MCHC RBC AUTO-ENTMCNC: 34 G/DL (ref 31.5–36.5)
MCV RBC AUTO: 92 FL (ref 78–100)
PLATELET # BLD AUTO: 272 10E3/UL (ref 150–450)
POTASSIUM SERPL-SCNC: 4.3 MMOL/L (ref 3.4–5.3)
RBC # BLD AUTO: 4.62 10E6/UL (ref 4.4–5.9)
SODIUM SERPL-SCNC: 140 MMOL/L (ref 135–145)
WBC # BLD AUTO: 6.7 10E3/UL (ref 4–11)

## 2025-06-30 PROCEDURE — 85027 COMPLETE CBC AUTOMATED: CPT | Performed by: PHYSICIAN ASSISTANT

## 2025-06-30 PROCEDURE — 93000 ELECTROCARDIOGRAM COMPLETE: CPT | Performed by: PHYSICIAN ASSISTANT

## 2025-06-30 PROCEDURE — 36415 COLL VENOUS BLD VENIPUNCTURE: CPT | Performed by: PHYSICIAN ASSISTANT

## 2025-06-30 PROCEDURE — 99214 OFFICE O/P EST MOD 30 MIN: CPT | Performed by: PHYSICIAN ASSISTANT

## 2025-06-30 PROCEDURE — 3079F DIAST BP 80-89 MM HG: CPT | Performed by: PHYSICIAN ASSISTANT

## 2025-06-30 PROCEDURE — 3075F SYST BP GE 130 - 139MM HG: CPT | Performed by: PHYSICIAN ASSISTANT

## 2025-06-30 PROCEDURE — 80048 BASIC METABOLIC PNL TOTAL CA: CPT | Performed by: PHYSICIAN ASSISTANT

## 2025-06-30 NOTE — PROGRESS NOTES
Preoperative Evaluation  [facility]  [address] [facility] [address]  Phone: [phone]  Fax: [phone]  Primary Provider: CLAIRE Enciso CNP  Pre-op Performing Provider: ALIDA Cannon  Jun 30, 2025 6/30/2025   Surgical Information   What procedure is being done? repair a torn meniscus   Facility or Hospital where procedure/surgery will be performed: [facility]   Who is doing the procedure / surgery? dr wade   Date of surgery / procedure: july 23   Time of surgery / procedure: dont know   Where do you plan to recover after surgery? at home with family     Fax number for surgical facility: [phone]    Assessment & Plan     The proposed surgical procedure is considered INTERMEDIATE risk.    (I10) Essential hypertension  (primary encounter diagnosis)  Comment: Controlled  Plan: CBC with platelets, Basic metabolic panel, EKG         12-lead complete w/read - Clinics        Continue current treatment plan    (M25.562) Acute pain of left knee  Comment: Injury while cutting a tree up  Plan: Has medial meniscal injury scheduled for surgery    (G47.33) SANDEE on CPAP  Comment: Does have SANDEE  Plan: Uses CPAP    (Z01.818) Pre-op exam  Comment: Here for clearance  Plan: CBC with platelets, Basic metabolic panel, EKG         12-lead complete w/read - Clinics                       No aspirin or NSAIDs 1 week prior to surgery    Recommendation  Approval given to proceed with proposed procedure pending review of diagnostic evaluation.        Sidney Worthy is a 65 year old, presenting for the following:  Pre-Op Exam (DOS 7/23/25 Dr. Wade left knee partial medial meniscectomy at [facility] [phone] )          6/30/2025    12:08 PM   Additional Questions   Roomed by LILI Byrd     HPI: Left knee injury insidious onset has medial meniscal injury scheduled for repair          6/30/2025   Pre-Op Questionnaire    Have you ever had a heart attack or stroke? No   Have you ever had surgery on your heart or blood vessels, such as a stent placement, a coronary artery bypass, or surgery on an artery in your head, neck, heart, or legs? No   Do you have chest pain with activity? No   Do you have a history of heart failure? No   Do you currently have a cold, bronchitis or symptoms of other infection? No   Do you have a cough, shortness of breath, or wheezing? No   Do you or anyone in your family have previous history of blood clots? No   Do you or does anyone in your family have a serious bleeding problem such as prolonged bleeding following surgeries or cuts? No   Have you ever had problems with anemia or been told to take iron pills? No   Have you had any abnormal blood loss such as black, tarry or bloody stools? No   Have you ever had a blood transfusion? No   Are you willing to have a blood transfusion if it is medically needed before, during, or after your surgery? Yes   Have you or any of your relatives ever had problems with anesthesia? No   Do you have sleep apnea, excessive snoring or daytime drowsiness? (!) YES   Do you have a CPAP machine? Yes   Do you have any artifical heart valves or other implanted medical devices like a pacemaker, defibrillator, or continuous glucose monitor? No   Do you have artificial joints? (!) YES right knee   Are you allergic to latex? No     Advance Care Planning        Preoperative Review of    reviewed - no record of controlled substances prescribed.          Patient Active Problem List    Diagnosis Date Noted    Class 2 severe obesity due to excess calories with serious comorbidity in adult (H) 10/07/2024     Priority: Medium    Obesity 08/30/2022     Priority: Medium    Dyslipidemia 08/30/2022     Priority: Medium    Chronic ankle pain 08/30/2022     Priority: Medium    OA (osteoarthritis) 12/01/2021     Priority: Medium    S/P ankle fusion 11/09/2020     Priority: Medium    S/P  total knee arthroplasty, right 12/31/2019     Priority: Medium    Primary osteoarthritis, left ankle and foot 09/06/2017     Priority: Medium    SANDEE on CPAP 09/06/2017     Priority: Medium      Past Medical History:   Diagnosis Date    Hearing aid worn     Obese     SANDEE on CPAP 09/06/2017    Other chronic pain     Sleep apnea      Past Surgical History:   Procedure Laterality Date    ANKLE ARTHRODESIS Left 09/06/2017    ANKLE SURGERY Left 07/01/2016    ARTHRODESIS ANKLE Left 11/09/2020    Procedure: REVISION LEFT ANKLE FUSION WITH;  Surgeon: Damaris Panda MD;  Location:  OR    ARTHRODESIS ANKLE Left 09/06/2017    Procedure: LEFT ANKLE FUSION;  Surgeon: Gil Rasmussen DPM;  Location: [facility] Main OR;  Service:     ARTHROPLASTY KNEE  12/30/2019    AS CT COLONOGRAPHY, SCREENING  02/17/2021    COLONOSCOPY  01/20/2011    ENT SURGERY      T+A    EXCHANGE POLY COMPONENT ARTHROPLASTY KNEE Right 12/01/2021    Procedure: RIGHT KNEE UNICOMPARTMENTAL REVISION WITH POLYETHYLENE EXCHANGE;  Surgeon: Eloy Wade MD;  Location: [facility] Main OR    MANDIBLE FRACTURE SURGERY      ORTHOPEDIC SURGERY      rt knee replacement,lt ankle X2,, facial reconstruction    OSTEOTOMY ANKLE Left 11/09/2020    Procedure: CORRECTIVE OSTEOTOMY;  Surgeon: Damaris Panda MD;  Location:  OR    RECONSTRUCTION MID-FACE  1998    facial bones    REMOVE HARDWARE LOWER EXTREMITY Left 09/06/2017    Procedure: HARDWARE REMOVAL;  Surgeon: Gil Rasmussen DPM;  Location: [facility] Main OR;  Service:     TONSILLECTOMY & ADENOIDECTOMY  1963    TOTAL KNEE ARTHROPLASTY Right     ZZC FUSION FOOT BONES,SUBTALAR Left 07/01/2016    Procedure: Left Ankle Open Debridement Left Subtalar Fusion, Peroneal Debridement and Tenodeses, Augment Graft;  Surgeon: Rochelle Verduzco MD;  Location: [facility] OR;  Service: Orthopedics     Current Outpatient Medications   Medication Sig Dispense Refill    hydroCHLOROthiazide 12.5 MG tablet  "Take 1 tablet (12.5 mg) by mouth daily. 30 tablet 1    ibuprofen (ADVIL/MOTRIN) 200 MG tablet Take 200 mg by mouth every 4 hours as needed for pain. Taking 1200mg daily      lisinopril (ZESTRIL) 20 MG tablet Take 1 tablet (20 mg) by mouth daily. 94 tablet 3    multivitamin w/minerals (MULTI-VITAMIN) tablet Take 1 tablet by mouth daily      rosuvastatin (CRESTOR) 20 MG tablet TAKE ONE TABLET (20 MG) BY MOUTH DAILY 90 tablet 3    tamsulosin (FLOMAX) 0.4 MG capsule Take 1 capsule (0.4 mg) by mouth daily. 90 capsule 3       No Known Allergies     Social History     Tobacco Use    Smoking status: Never     Passive exposure: Never    Smokeless tobacco: Former     Types: Snuff   Substance Use Topics    Alcohol use: Yes     Comment: social     No Family history of anesthesia problems no family history of bleeding disorders  History   Drug Use Unknown               Objective    BP (!) 146/88 (BP Location: Right arm, Patient Position: Sitting, Cuff Size: Adult Large)   Pulse 75   Temp 98.1  F (36.7  C) (Tympanic)   Resp 18   Ht 1.867 m (6' 1.5\")   Wt 118.6 kg (261 lb 6.4 oz)   SpO2 95%   BMI 34.02 kg/m     Estimated body mass index is 34.02 kg/m  as calculated from the following:    Height as of this encounter: 1.867 m (6' 1.5\").    Weight as of this encounter: 118.6 kg (261 lb 6.4 oz).  Physical Exam  Vitals and nursing note reviewed.   Constitutional:       Appearance: Normal appearance.   HENT:      Head: Normocephalic and atraumatic.      Right Ear: Tympanic membrane normal.      Left Ear: Tympanic membrane normal.      Nose: Nose normal. No congestion or rhinorrhea.      Mouth/Throat:      Mouth: Mucous membranes are moist.   Eyes:      Conjunctiva/sclera: Conjunctivae normal.   Cardiovascular:      Rate and Rhythm: Normal rate and regular rhythm.      Heart sounds: Normal heart sounds.   Pulmonary:      Effort: Pulmonary effort is normal.      Breath sounds: Normal breath sounds.   Abdominal:      General: " Abdomen is flat. Bowel sounds are normal.      Palpations: There is no mass.      Tenderness: There is no abdominal tenderness.   Musculoskeletal:         General: Swelling present.      Cervical back: Normal range of motion and neck supple.      Right lower leg: No edema.      Left lower leg: No edema.   Lymphadenopathy:      Cervical: No cervical adenopathy.   Skin:     General: Skin is warm and dry.   Neurological:      General: No focal deficit present.   Psychiatric:         Mood and Affect: Mood normal.         Behavior: Behavior normal.         Thought Content: Thought content normal.         Judgment: Judgment normal.           Recent Labs   Lab Test 09/11/24  1023      POTASSIUM 4.8   CR 0.92        Diagnostics  Labs pending at this time.  Results will be reviewed when available.   EKG with no acute change  Revised Cardiac Risk Index (RCRI)  The patient has the following serious cardiovascular risks for perioperative complications:   - No serious cardiac risks = 0 points     RCRI Interpretation: 0 points: Class I (very low risk - 0.4% complication rate)         Signed Electronically by: ALIDA Cannon  A copy of this evaluation report is provided to the requesting physician.

## 2025-08-04 ENCOUNTER — TRANSFERRED RECORDS (OUTPATIENT)
Dept: HEALTH INFORMATION MANAGEMENT | Facility: CLINIC | Age: 65
End: 2025-08-04
Payer: MEDICARE

## 2025-08-12 ENCOUNTER — PATIENT OUTREACH (OUTPATIENT)
Dept: CARE COORDINATION | Facility: CLINIC | Age: 65
End: 2025-08-12
Payer: MEDICARE

## 2025-08-14 ENCOUNTER — MYC MEDICAL ADVICE (OUTPATIENT)
Dept: FAMILY MEDICINE | Facility: CLINIC | Age: 65
End: 2025-08-14
Payer: MEDICARE

## 2025-08-14 DIAGNOSIS — E78.2 MIXED HYPERLIPIDEMIA: ICD-10-CM

## 2025-08-14 DIAGNOSIS — R03.0 ELEVATED BLOOD PRESSURE READING WITHOUT DIAGNOSIS OF HYPERTENSION: ICD-10-CM

## 2025-08-14 RX ORDER — ROSUVASTATIN CALCIUM 20 MG/1
TABLET, COATED ORAL
Qty: 90 TABLET | Refills: 3 | OUTPATIENT
Start: 2025-08-14

## 2025-08-14 RX ORDER — HYDROCHLOROTHIAZIDE 12.5 MG/1
12.5 TABLET ORAL DAILY
Qty: 90 TABLET | Refills: 1 | Status: SHIPPED | OUTPATIENT
Start: 2025-08-14

## (undated) DEVICE — CUSTOM PACK TOTAL KNEE SOP5BTKHEC

## (undated) DEVICE — SUCTION CANISTER MEDIVAC LINER 3000ML W/LID 65651-530

## (undated) DEVICE — SOLUTION IRRIG 2B7127 .9NS 3000ML BAG

## (undated) DEVICE — GLOVE UNDER INDICATOR PI SZ 8.5 LF 41685

## (undated) DEVICE — SUTURE VICRYL+ 1 18 CT/CR  VLT VCP753D

## (undated) DEVICE — SUTURE MONOCRYL+ 3-0 27 UND MCP523H

## (undated) DEVICE — PACK EXTREMITY SOP15EXFSD

## (undated) DEVICE — SOL WATER IRRIG 1000ML BOTTLE 2F7114

## (undated) DEVICE — GOWN XXLG REINFORCED 9071EL

## (undated) DEVICE — HOOD FLYTE SURGICOOL

## (undated) DEVICE — REAMER SHAFT MODIFIED TRINKLE 8X510MM 0227-8510S

## (undated) DEVICE — GLOVE BIOGEL PI INDICATOR 8.0 LF 41680

## (undated) DEVICE — GLOVE PROTEXIS W/NEU-THERA 8.5  2D73TE85

## (undated) DEVICE — BNDG ELASTIC 4"X5YDS UNSTERILE 6611-40

## (undated) DEVICE — GLOVE PROTEXIS MICRO 8.5  2D73PM85

## (undated) DEVICE — SOL NACL 0.9% IRRIG 1000ML BOTTLE 2F7124

## (undated) DEVICE — GLOVE PROTEXIS W/NEU-THERA 8.0  2D73TE80

## (undated) DEVICE — BNDG ELASTIC 4" DBL LENGTH UNSTERILE 6611-14

## (undated) DEVICE — SU VICRYL 2-0 CT-2 27" UND J269H

## (undated) DEVICE — SPONGE SURGIFOAM 100 1974

## (undated) DEVICE — CAST PADDING 4" UNSTERILE 9044

## (undated) DEVICE — BONE CEMENT MIXEVAC III HI VAC KIT  0206-015-000

## (undated) DEVICE — DRAPE SHEET REV FOLD 3/4 9349

## (undated) DEVICE — CAST PLASTER SPLINT 5X30" 7395

## (undated) DEVICE — TOURNIQUET SGL  BLADDER 30"X4" BLUE 5921030135

## (undated) DEVICE — Device

## (undated) DEVICE — DRSG AQUACEL AG HYDROFIBER  3.5X10" 422605

## (undated) DEVICE — DRSG ABDOMINAL 07 1/2X8" 7197D

## (undated) DEVICE — HOLDER LIMB VELCRO OR 0814-1533

## (undated) DEVICE — PLATE GROUNDING ADULT W/CORD 9165L

## (undated) DEVICE — CLOSURE SYS SKIN PREMIERPRO EXOFIN FUSION 4X22CM STRL 3472

## (undated) DEVICE — DRILL POINT STRK 4.2X340MM 1806-4260S

## (undated) DEVICE — STPL SKIN 35W ROTATING HEAD PRW35

## (undated) DEVICE — LINEN TOWEL PACK X5 5464

## (undated) DEVICE — IMM LIMB ELEVATOR DC40-0203

## (undated) DEVICE — CAST PADDING 4" STERILE 9044S

## (undated) DEVICE — BLADE SAW SAGITTAL STRK 18X90X1.27MM HD SYS 6 6118-127-090

## (undated) DEVICE — NEEDLE HYPO 21GA X 1-1/2 SAFETY 305917

## (undated) DEVICE — SUTURE VICRYL+ 2-0 27IN CT-1 UND VCP259H

## (undated) DEVICE — DEVICE RETRIEVER HEWSON 71111579

## (undated) DEVICE — PREP DURAPREP 26ML APL 8630

## (undated) DEVICE — BLADE SAW SAGITTAL STRK DUAL CUT 4118-135-090

## (undated) DEVICE — CUSTOM PACK TOTAL KNEE ACCESSORY SOP5BTAHEA

## (undated) DEVICE — WIRE GUIDE STRK BALL TIP 3X800MM 1806-0080S

## (undated) DEVICE — BLADE KNIFE SURG 15 371115

## (undated) DEVICE — GLOVE SURG PI ULTRA TOUCH M SZ 8-1/2 LF

## (undated) DEVICE — CAST FIBERGLASS 4" ROLL WHITE 82004

## (undated) DEVICE — GLOVE SURG PI ULTRA TOUCH M SZ 7-1/2 LF

## (undated) DEVICE — GLOVE PROTEXIS W/NEU-THERA 7.5  2D73TE75

## (undated) RX ORDER — FENTANYL CITRATE 50 UG/ML
INJECTION, SOLUTION INTRAMUSCULAR; INTRAVENOUS
Status: DISPENSED
Start: 2020-11-09

## (undated) RX ORDER — HYDROMORPHONE HYDROCHLORIDE 1 MG/ML
INJECTION, SOLUTION INTRAMUSCULAR; INTRAVENOUS; SUBCUTANEOUS
Status: DISPENSED
Start: 2020-11-09

## (undated) RX ORDER — CEFAZOLIN SODIUM 2 G/100ML
INJECTION, SOLUTION INTRAVENOUS
Status: DISPENSED
Start: 2020-11-09

## (undated) RX ORDER — ONDANSETRON 2 MG/ML
INJECTION INTRAMUSCULAR; INTRAVENOUS
Status: DISPENSED
Start: 2020-11-09

## (undated) RX ORDER — FENTANYL CITRATE 0.05 MG/ML
INJECTION, SOLUTION INTRAMUSCULAR; INTRAVENOUS
Status: DISPENSED
Start: 2020-11-09

## (undated) RX ORDER — PROPOFOL 10 MG/ML
INJECTION, EMULSION INTRAVENOUS
Status: DISPENSED
Start: 2020-11-09